# Patient Record
Sex: FEMALE | Race: WHITE | NOT HISPANIC OR LATINO | URBAN - METROPOLITAN AREA
[De-identification: names, ages, dates, MRNs, and addresses within clinical notes are randomized per-mention and may not be internally consistent; named-entity substitution may affect disease eponyms.]

---

## 2018-03-01 ENCOUNTER — EMERGENCY (EMERGENCY)
Facility: HOSPITAL | Age: 68
LOS: 1 days | Discharge: HOME | End: 2018-03-01
Attending: EMERGENCY MEDICINE | Admitting: EMERGENCY MEDICINE
Payer: MEDICARE

## 2018-03-01 VITALS
RESPIRATION RATE: 20 BRPM | HEART RATE: 84 BPM | SYSTOLIC BLOOD PRESSURE: 182 MMHG | DIASTOLIC BLOOD PRESSURE: 95 MMHG | TEMPERATURE: 98 F | OXYGEN SATURATION: 100 %

## 2018-03-01 LAB
ALBUMIN SERPL ELPH-MCNC: 4 G/DL — SIGNIFICANT CHANGE UP (ref 3.3–5)
ALP SERPL-CCNC: 59 U/L — SIGNIFICANT CHANGE UP (ref 40–120)
ALT FLD-CCNC: 15 U/L — SIGNIFICANT CHANGE UP (ref 4–33)
APPEARANCE UR: CLEAR — SIGNIFICANT CHANGE UP
AST SERPL-CCNC: 17 U/L — SIGNIFICANT CHANGE UP (ref 4–32)
BACTERIA # UR AUTO: SIGNIFICANT CHANGE UP
BASOPHILS # BLD AUTO: 0.04 K/UL — SIGNIFICANT CHANGE UP (ref 0–0.2)
BASOPHILS NFR BLD AUTO: 0.6 % — SIGNIFICANT CHANGE UP (ref 0–2)
BILIRUB SERPL-MCNC: 0.6 MG/DL — SIGNIFICANT CHANGE UP (ref 0.2–1.2)
BILIRUB UR-MCNC: NEGATIVE — SIGNIFICANT CHANGE UP
BLOOD UR QL VISUAL: NEGATIVE — SIGNIFICANT CHANGE UP
BUN SERPL-MCNC: 14 MG/DL — SIGNIFICANT CHANGE UP (ref 7–23)
CALCIUM SERPL-MCNC: 9.2 MG/DL — SIGNIFICANT CHANGE UP (ref 8.4–10.5)
CHLORIDE SERPL-SCNC: 98 MMOL/L — SIGNIFICANT CHANGE UP (ref 98–107)
CO2 SERPL-SCNC: 23 MMOL/L — SIGNIFICANT CHANGE UP (ref 22–31)
COLOR SPEC: SIGNIFICANT CHANGE UP
CREAT SERPL-MCNC: 0.81 MG/DL — SIGNIFICANT CHANGE UP (ref 0.5–1.3)
EOSINOPHIL # BLD AUTO: 0.07 K/UL — SIGNIFICANT CHANGE UP (ref 0–0.5)
EOSINOPHIL NFR BLD AUTO: 1 % — SIGNIFICANT CHANGE UP (ref 0–6)
GLUCOSE SERPL-MCNC: 119 MG/DL — HIGH (ref 70–99)
GLUCOSE UR-MCNC: NEGATIVE — SIGNIFICANT CHANGE UP
HBA1C BLD-MCNC: 5.8 % — HIGH (ref 4–5.6)
HCT VFR BLD CALC: 40.8 % — SIGNIFICANT CHANGE UP (ref 34.5–45)
HGB BLD-MCNC: 14.3 G/DL — SIGNIFICANT CHANGE UP (ref 11.5–15.5)
IMM GRANULOCYTES # BLD AUTO: 0.02 # — SIGNIFICANT CHANGE UP
IMM GRANULOCYTES NFR BLD AUTO: 0.3 % — SIGNIFICANT CHANGE UP (ref 0–1.5)
KETONES UR-MCNC: SIGNIFICANT CHANGE UP
LEUKOCYTE ESTERASE UR-ACNC: NEGATIVE — SIGNIFICANT CHANGE UP
LYMPHOCYTES # BLD AUTO: 1.44 K/UL — SIGNIFICANT CHANGE UP (ref 1–3.3)
LYMPHOCYTES # BLD AUTO: 21.2 % — SIGNIFICANT CHANGE UP (ref 13–44)
MCHC RBC-ENTMCNC: 31 PG — SIGNIFICANT CHANGE UP (ref 27–34)
MCHC RBC-ENTMCNC: 35 % — SIGNIFICANT CHANGE UP (ref 32–36)
MCV RBC AUTO: 88.3 FL — SIGNIFICANT CHANGE UP (ref 80–100)
MONOCYTES # BLD AUTO: 0.41 K/UL — SIGNIFICANT CHANGE UP (ref 0–0.9)
MONOCYTES NFR BLD AUTO: 6 % — SIGNIFICANT CHANGE UP (ref 2–14)
MUCOUS THREADS # UR AUTO: SIGNIFICANT CHANGE UP
NEUTROPHILS # BLD AUTO: 4.81 K/UL — SIGNIFICANT CHANGE UP (ref 1.8–7.4)
NEUTROPHILS NFR BLD AUTO: 70.9 % — SIGNIFICANT CHANGE UP (ref 43–77)
NITRITE UR-MCNC: NEGATIVE — SIGNIFICANT CHANGE UP
NRBC # FLD: 0 — SIGNIFICANT CHANGE UP
PH UR: 8 — SIGNIFICANT CHANGE UP (ref 4.6–8)
PLATELET # BLD AUTO: 165 K/UL — SIGNIFICANT CHANGE UP (ref 150–400)
PMV BLD: 10.4 FL — SIGNIFICANT CHANGE UP (ref 7–13)
POTASSIUM SERPL-MCNC: 3.8 MMOL/L — SIGNIFICANT CHANGE UP (ref 3.5–5.3)
POTASSIUM SERPL-SCNC: 3.8 MMOL/L — SIGNIFICANT CHANGE UP (ref 3.5–5.3)
PROT SERPL-MCNC: 7.1 G/DL — SIGNIFICANT CHANGE UP (ref 6–8.3)
PROT UR-MCNC: 20 MG/DL — SIGNIFICANT CHANGE UP
RBC # BLD: 4.62 M/UL — SIGNIFICANT CHANGE UP (ref 3.8–5.2)
RBC # FLD: 12 % — SIGNIFICANT CHANGE UP (ref 10.3–14.5)
RBC CASTS # UR COMP ASSIST: SIGNIFICANT CHANGE UP (ref 0–?)
SODIUM SERPL-SCNC: 137 MMOL/L — SIGNIFICANT CHANGE UP (ref 135–145)
SP GR SPEC: 1.01 — SIGNIFICANT CHANGE UP (ref 1–1.04)
SQUAMOUS # UR AUTO: SIGNIFICANT CHANGE UP
UROBILINOGEN FLD QL: NORMAL MG/DL — SIGNIFICANT CHANGE UP
WBC # BLD: 6.79 K/UL — SIGNIFICANT CHANGE UP (ref 3.8–10.5)
WBC # FLD AUTO: 6.79 K/UL — SIGNIFICANT CHANGE UP (ref 3.8–10.5)
WBC UR QL: SIGNIFICANT CHANGE UP (ref 0–?)

## 2018-03-01 PROCEDURE — 70548 MR ANGIOGRAPHY NECK W/DYE: CPT | Mod: 26

## 2018-03-01 PROCEDURE — 70450 CT HEAD/BRAIN W/O DYE: CPT | Mod: 26

## 2018-03-01 PROCEDURE — 99220: CPT | Mod: GC

## 2018-03-01 PROCEDURE — 71046 X-RAY EXAM CHEST 2 VIEWS: CPT | Mod: 26

## 2018-03-01 PROCEDURE — 70551 MRI BRAIN STEM W/O DYE: CPT | Mod: 26

## 2018-03-01 NOTE — CONSULT NOTE ADULT - ASSESSMENT
Ms. Draper  is a 67 year old female with a PMHx of vertigo (peripheral-room spinning), GERD, tinnitus in left ear, bradycardiac pw lightheadedness that spontaneously resolved. Pt now at her baseline. She follows with Dr. Moreno who had an MRI b and MRA h/n completed sometime in 2014 approx that was reportedly unremarkable. NIHSS 0. Outpatient or cdu work up.    []MRI brain w/o laurel, MRA head w/o laurel, MRA neck with laurel  []F/u with cardiologist for cardiogenic causes of syncope. TTE  []asa 81mg and atorvastain (low dose)  []a1c and ldl

## 2018-03-01 NOTE — ED PROVIDER NOTE - ATTENDING CONTRIBUTION TO CARE
I performed a face to face evaluation of this patient and obtained a history and performed a full exam.  I agree with the history, physical exam and plan of the PA.  Pt with vertigo, lightheadedness, no headache, chest pain, no blurry vision. Well appearing in nad, jade eomi, op wnl, neck supple heart wnl, lung cta, neuro wnl, coordination normal and cn2-12 intact, r/o neuro cause vs. other, labs, ct, neuro consult

## 2018-03-01 NOTE — ED PROVIDER NOTE - PROGRESS NOTE DETAILS
TERELL Mcgill: pt NAD, VSS. Spoke with neuro regarding case - will evaluate the patient in the ED. being seen with neuro, and recommended outpt followup, still being seen- consider CDU, will call pmd TERELL Mcgill: neuro evaluated the patient recommended CDU vs outpt f.u. Pt prefers to stay for w/u for TTE/MRI TERELL Mcgill: neuro evaluated the patient recommended CDU vs outpt f.u. Pt prefers to stay for w/u for possible posterior stroke. Plan: TTE/MRI TERELL Mcgill: spoke with the patients PCP Dr. Doc rowell with plan for CDU. [phone]

## 2018-03-01 NOTE — ED PROVIDER NOTE - OBJECTIVE STATEMENT
67 year old female with a PMHx of vertigo, GERD, tinnitus in left ear, bradycardiac pw lightheadedness and ataxia that began today at 09:00. Pt states that it occurred at rest this morning for a few moments and resolved by itself then occurred more severely after having a bowel movement. Pt states this is nto like her vertigo symptoms. Denies LOC, headache, facial droop, head trauma, n/v/f/c, CP, SOB, abdominal pain, dysuria, hematuria, melena, hematochezia, diarrhea, numbness/tingling in the extremities.

## 2018-03-01 NOTE — ED CDU PROVIDER INITIAL DAY NOTE - OBJECTIVE STATEMENT
Pt is a 66 y/o F nonsmoker PMHx of vertigo, GERD, tinnitus in left ear, bradycardiac p/w lightheadedness this morning.  Pt states this morning pt felt lightheaded as though he may pass out associated with feeling off balance lasting a few minutes then gradually improved.  Pt states symptoms not similar to vertigo.  Pt notes some lightheadedness during BM thereafter.  Pt denies any fevers, chills, nausea, vomiting, diarrhea, constipation, chest pain, SOB, palpitations, syncope, headache, calf pain/swelling, h/o dvt/pe in past, hemoptysis, recent surgeries, recent prolonged immobilization, illicit drug use.  Pt seen by neurology who recommends MRI/MRA, echo.

## 2018-03-01 NOTE — ED CDU PROVIDER INITIAL DAY NOTE - CHPI ED SYMPTOMS NEG
no shortness of breath/no fever/no syncope/no back pain/no chills/no cough/no diaphoresis/no nausea/no vomiting

## 2018-03-01 NOTE — ED CDU PROVIDER INITIAL DAY NOTE - PROGRESS NOTE DETAILS
This patient was signed out to me by day CDU TERELL Dumont and attending Dr. Tiwari; test results reviewed.  In the interim, pt has been resting comfortably; no issues to date.  Pt. stable at present; will continue to monitor/reassess.

## 2018-03-01 NOTE — ED PROVIDER NOTE - MEDICAL DECISION MAKING DETAILS
67 year old female with a PMHx of vertigo, GERD, tinnitus in left ear, bradycardiac pw lightheadedness and ataxia that began today at 09:00.   Plan: cbc, cmp, ct head r/o cerebellar stroke

## 2018-03-01 NOTE — ED ADULT NURSE NOTE - OBJECTIVE STATEMENT
Pt received to room 22 A&Ox4 complaining of dizziness that started this AM. Pt states that she has a hx of vertigo but these symptoms are not similar to that. Pt denies chest pain, SOB, N/V/D, fever or chills. Pt placed on cardiac monitor. IV access obtained, labs drawn and sent.

## 2018-03-01 NOTE — ED CDU PROVIDER INITIAL DAY NOTE - ATTENDING CONTRIBUTION TO CARE
I performed a face to face bedside interview with patient regarding history of present illness, review of symptoms and past medical history. I completed an independent physical exam.  I have discussed patient's plan of care with PA.   I agree with note as stated above, having amended the EMR as needed to reflect my findings. I have discussed the assessment and plan of care.  This includes during the time I functioned as the attending physician for this patient.    Attending Exam - Dr. Tiwari: GEN: well appearing, NAD  HEENT: +PERRL, EOMI  RESP: CTAB, no signs of respiratory distress CV: s1s2 RRR ABD: soft/non tender/non distended  MSK: no deformities / swelling, normal range of motion, spine grossly normal NEURO: alert, non focal exam SKIN: normal color / temperature / condition.    Attending MDM: will follow neuro recs, obtain MRI/MRA, ECHO, tele obs, neurochecks.  Currently no neuro deficits and feels better.

## 2018-03-01 NOTE — CONSULT NOTE ADULT - ATTENDING COMMENTS
pt seen with housestaff. briefly episode of near syncope on getting up from the toilet with sig hypertension,  DDx: TIA vs. hypertensive crisis vs. presyncope.  abcd2 score 2 low likelihood of recurrence.  MRI/MRA without any sig finding. MRA no evidence of any sig basilar stenosis.  osteoma, previously seen unchanged on the left.  She is followed by neurologist Dr. Cowart  bradycardia w/u per medicine.   would favor asa 81 temporarily  bp goal < 140 consider monitoring.   no further neurologic w/u indicated.

## 2018-03-01 NOTE — ED CDU PROVIDER INITIAL DAY NOTE - MEDICAL DECISION MAKING DETAILS
Pt is a 66 y/o F nonsmoker PMHx of vertigo, GERD, tinnitus in left ear, bradycardiac p/w lightheadedness this morning -- likely near syncope, possible cardiogenic cause -- telemetry, echo, mri/mra

## 2018-03-01 NOTE — CONSULT NOTE ADULT - SUBJECTIVE AND OBJECTIVE BOX
NEUROLOGY CONSULT NOTE    Patient is a 67y old  Female who presents with a chief complaint of     HPI:  Ms. Draper  is a 67 year old female with a PMHx of vertigo (peripheral-room spinning), GERD, tinnitus in left ear, bradycardiac pw lightheadedness today at 09:00 and a return to baseline in the ER today.        Neurological Review of Systems:  No difficulty with language.  No vision loss or double vision.  No dizziness, vertigo or new hearing loss.  No difficulty with speech or swallowing.  No focal weakness.  No focal sensory changes.  No numbness or tingling in the bilateral lower extremities.  No difficulty with balance.  No difficulty with ambulation.        MEDICATIONS  (STANDING):    MEDICATIONS  (PRN):    Allergies    Allergy Status Unknown    Intolerances      PAST MEDICAL & SURGICAL HISTORY:  Bradycardia  Tinnitus  Vertigo  GERD (gastroesophageal reflux disease)  No significant past surgical history    FAMILY HISTORY:    SOCIAL HISTORY: non smoker/ former smoker/ active smoker    Review of Systems:  Constitutional: No generalized weakness. No fevers or chills.                    Eyes, Ears, Mouth, Throat: No vision loss   Respiratory: No shortness of breath or cough.                                Cardiovascular: No chest pain or palpitations  Gastrointestinal: No nausea or vomiting.                                         Genitourinary: No urinary incontinence or burning on urination.  Musculoskeletal: No joint pain.                                                           Dermatologic: No rash.  Neurological: as per HPI                                                                      Psychiatric: No behavioral problems.  Endocrine: No known hypoglycemia.               Hematologic/Lymphatic: No easy bleeding.    O:  Vital Signs Last 24 Hrs  T(C): 36.9 (01 Mar 2018 10:39), Max: 36.9 (01 Mar 2018 09:53)  T(F): 98.4 (01 Mar 2018 10:39), Max: 98.4 (01 Mar 2018 09:53)  HR: 60 (01 Mar 2018 10:39) (60 - 84)  BP: 162/96 (01 Mar 2018 10:39) (162/96 - 182/95)  BP(mean): --  RR: 16 (01 Mar 2018 10:39) (16 - 20)  SpO2: 99% (01 Mar 2018 10:39) (99% - 100%)    General Exam:   General appearance: No acute distress                 Cardiovascular: Pedal dorsalis pulses intact bilaterally    MENTAL STATUS: Orientated to self, date and place.  Attention intact.  No dysarthria, aphasia or neglect.  Knowledge intact.  Registration intact.  Short and long term memory grossly intact.      CRANIAL NERVES: CN I - not tested.  PERRL, EOMI, VFF, no nystagmus or diplopia.  No APD.  Fundi not visualized.  CN V1-3 intact to light touch and pinprick.  No facial asymmetry.  Hearing intact to finger rub bilaterally.  Tongue, uvula and palate midline.  Sternocleidomastoid and Trapezius intact bilaterally.    MOTOR:   Tone: normal.                  Strength intact throughout  No pronator drift bilaterally                      No dysmetria on finger-nose-finger or heel-shin-heel  No truncal ataxia.  No resting, postural or action tremor.  No myoclonus.      UPPER EXTREMITY on resistance          DELT     BICEP     TRICEP      R   ///  L              5    LOWER EXTREMITY on resistance        HF      KE      KF     DP     PF  R      L          5     SENSORY: intact to light touch, pinprick, vibration and proprioception    GAIT: normal and stable.  Oneliaerg -rah.      LABS:                        14.3   6.79  )-----------( 165      ( 01 Mar 2018 10:42 )             40.8     03-01    137  |  98  |  14  ----------------------------<  119<H>  3.8   |  23  |  0.81    Ca    9.2      01 Mar 2018 10:42    TPro  7.1  /  Alb  4.0  /  TBili  0.6  /  DBili  x   /  AST  17  /  ALT  15  /  AlkPhos  59  03-      Urinalysis Basic - ( 01 Mar 2018 12:37 )    Color: COLORL / Appearance: CLEAR / S.006 / pH: 8.0  Gluc: NEGATIVE / Ketone: TRACE  / Bili: NEGATIVE / Urobili: NORMAL mg/dL   Blood: NEGATIVE / Protein: 20 mg/dL / Nitrite: NEGATIVE   Leuk Esterase: NEGATIVE / RBC: 0-2 / WBC 0-2   Sq Epi: OCC / Non Sq Epi: x / Bacteria: FEW      LIVER FUNCTIONS - ( 01 Mar 2018 10:42 )  Alb: 4.0 g/dL / Pro: 7.1 g/dL / ALK PHOS: 59 u/L / ALT: 15 u/L / AST: 17 u/L / GGT: x                   RADIOLOGY & ADDITIONAL STUDIES:    [] cxr: clear  [] ua: neg  [] CT h: noncontributory

## 2018-03-01 NOTE — ED ADULT TRIAGE NOTE - CHIEF COMPLAINT QUOTE
hx of vertigo reports sudden onset dizziness at 9am that feels different than her room spinning sensations she feels with vertigo, staes dizziness is more of "her spinning", pt notably hypertensive as well.    Fit eval by Dr Ng no stroke code called.

## 2018-03-02 VITALS
OXYGEN SATURATION: 98 % | DIASTOLIC BLOOD PRESSURE: 75 MMHG | RESPIRATION RATE: 16 BRPM | TEMPERATURE: 98 F | SYSTOLIC BLOOD PRESSURE: 132 MMHG | HEART RATE: 52 BPM

## 2018-03-02 LAB
BACTERIA UR CULT: SIGNIFICANT CHANGE UP
SPECIMEN SOURCE: SIGNIFICANT CHANGE UP

## 2018-03-02 PROCEDURE — 99217: CPT

## 2018-03-02 PROCEDURE — 93010 ELECTROCARDIOGRAM REPORT: CPT | Mod: 59,GC

## 2018-03-02 PROCEDURE — 93306 TTE W/DOPPLER COMPLETE: CPT | Mod: 26

## 2018-03-02 NOTE — ED CDU PROVIDER SUBSEQUENT DAY NOTE - ATTENDING CONTRIBUTION TO CARE
68yo F hx of vertigo, GERD, tinnitus of left ear, bradycardia presented to ED co transient episode of feeling lightheaded and ataxic gait. no cp or sob. no recent illness no abdominal pain. no vomit/diarrhea. no focal weakness or numbness. no change in vison. no  slurred speech. no trauma.   In ED symptoms had resolved, ct wnl. neurology recommend CDU for MRI and echo.   Vital signs noted. pt sitting up no acute distress. normal S1-S2 No resp distress. able to speak in full and clear sentences. no wheeze, rales or stridor. AOx3, no focal deficits. CN 2-12 grossly intact. nl gait. no meningeal signs.   plan pending MRI read, echo today, neurology to see pt,  re assess

## 2018-03-02 NOTE — ED CDU PROVIDER SUBSEQUENT DAY NOTE - PROGRESS NOTE DETAILS
Pt stable at present; will continue to monitor.  Pt. will be signed out to day CDU PA and MD at 0700 hrs. TERELL Mcgill: pt NAD, VSS. No acute complaints. Pt has been asymptomatic since initial symptoms yesterday morning. Pt ambulating without difficulty. PE: cardiac: RRR, Lungs: CTA, neuro: CN1-12 grossly intact, motor strength intact in all extremities, gait WNL. Discussed the results of the patients labs/imaging. Pt evaluated by Neuro this am - cleared for d/c. TERELL Mcgill: pt NAD, VSS. No acute complaints. No events on tele overnight. Pt has been asymptomatic since initial symptoms yesterday morning. Pt ambulating without difficulty. PE: cardiac: RRR, Lungs: CTA, neuro: CN1-12 grossly intact, motor strength intact in all extremities, gait WNL. Discussed the results of the patients labs/imaging. Pt evaluated by Neuro this am - cleared for d/c.

## 2018-03-02 NOTE — ED CDU PROVIDER DISPOSITION NOTE - CLINICAL COURSE
67 year old female with a PMHx of tinnitus, bradycardia, vertigo pw 2 episodes of lightheadedness/dizziness and ataxia yesterday. Pt had a bowel movement and symptoms occurred suddenly and resolved by itself after a few hours of onset. In the ED - negative ct - neuro evaluated the patient recommended MRI/MRA brain/neck as well as echo. MRI/echo WNL. Pt did not have any recurrence of symptoms while in the ED. Neuro cleared pt for d/c. Pt able to go home.

## 2018-03-02 NOTE — ED CDU PROVIDER SUBSEQUENT DAY NOTE - HISTORY
66 y/o F nonsmoker PMHx of vertigo, GERD, tinnitus in left ear, bradycardiac p/w lightheadedness this morning.  Pt states this morning pt felt lightheaded as though he may pass out associated with feeling off balance lasting a few minutes then gradually improved.  Pt states symptoms not similar to vertigo.  Pt notes some lightheadedness during BM thereafter.  Pt denies any fevers, chills, nausea, vomiting, diarrhea, constipation, chest pain, SOB, palpitations, syncope, headache, calf pain/swelling, h/o dvt/pe in past, hemoptysis, recent surgeries, recent prolonged immobilization, illicit drug use.  Pt seen by neurology who recommends MRI/MRA, echo.  In the interim, pt had MRI/A completed; results pending at time of this note entry.  No issues in interim; pt has been objectively noted to be resting comfortably.  Pt. scheduled for echo this morning.

## 2018-03-02 NOTE — ED CDU PROVIDER DISPOSITION NOTE - ATTENDING CONTRIBUTION TO CARE
Dr. Dutton- Attending Statement: I have reviewed and agree with all pertinent clinical information, including history and physical exam and agree with treatment plan of the PA, except as noted.  66yo F presented w episode of feeling dizzy and ataxic gait, self resolved. Asymptomatic in ED. no cp or sob. Evaluated by  neurology cleared for dc home. Recommend outpatient follow up. Wnl ct head, MRI, and echo. pt informed of results. advised to follow up with pmd and neurology. pt given copies of BP read while in ED. advised to have BP rechecked in one week.

## 2018-03-05 ENCOUNTER — EMERGENCY (EMERGENCY)
Facility: HOSPITAL | Age: 68
LOS: 1 days | Discharge: HOME | End: 2018-03-05
Attending: EMERGENCY MEDICINE
Payer: MEDICARE

## 2018-03-05 ENCOUNTER — TRANSCRIPTION ENCOUNTER (OUTPATIENT)
Age: 68
End: 2018-03-05

## 2018-03-05 VITALS
TEMPERATURE: 98 F | SYSTOLIC BLOOD PRESSURE: 191 MMHG | WEIGHT: 175.05 LBS | RESPIRATION RATE: 18 BRPM | OXYGEN SATURATION: 100 % | DIASTOLIC BLOOD PRESSURE: 98 MMHG | HEIGHT: 68 IN | HEART RATE: 74 BPM

## 2018-03-05 VITALS
HEART RATE: 56 BPM | RESPIRATION RATE: 18 BRPM | SYSTOLIC BLOOD PRESSURE: 152 MMHG | OXYGEN SATURATION: 99 % | DIASTOLIC BLOOD PRESSURE: 79 MMHG

## 2018-03-05 PROCEDURE — 93010 ELECTROCARDIOGRAM REPORT: CPT

## 2018-03-05 PROCEDURE — 99284 EMERGENCY DEPT VISIT MOD MDM: CPT | Mod: 25,GC

## 2018-03-05 PROCEDURE — 99283 EMERGENCY DEPT VISIT LOW MDM: CPT | Mod: 25

## 2018-03-05 PROCEDURE — 93005 ELECTROCARDIOGRAM TRACING: CPT

## 2018-03-05 RX ORDER — AMLODIPINE BESYLATE 2.5 MG/1
5 TABLET ORAL ONCE
Qty: 0 | Refills: 0 | Status: DISCONTINUED | OUTPATIENT
Start: 2018-03-05 | End: 2018-03-05

## 2018-03-05 RX ORDER — AMLODIPINE BESYLATE 2.5 MG/1
1 TABLET ORAL
Qty: 14 | Refills: 0
Start: 2018-03-05 | End: 2018-03-18

## 2018-03-05 NOTE — ED ADULT NURSE REASSESSMENT NOTE - NS ED NURSE REASSESS COMMENT FT1
Pt ambulated independently - Dr. Serna aware. Pt is in no current distress. Comfort and safety provided. Awaiting d/c.

## 2018-03-05 NOTE — ED PROVIDER NOTE - ATTENDING CONTRIBUTION TO CARE
68 y/o female with the above documented history who presents with a cc of elevated blood pressure a/w light-headedness. It is similar to what she experienced at the time of her recent admission to [facility] but not nearly as intense. On exam, she appears very well and comfortable and is neurologically intact. There is nothing clinically evident to suggest any emergent process; i.e., hypertensive emergency, CNS event, etc. We will obtain an EKG and have reached out to her physician(s), one of whom she has an appt with tomorrow. Barring any significant abnormality on her EKG or change in her clinical condition, she should be suitable for DC to f/u as is already arranged.

## 2018-03-05 NOTE — ED PROVIDER NOTE - MEDICAL DECISION MAKING DETAILS
67F w/ no pmh but recent workup for hypertensive urgency presents today with mild lightheadedness in the setting of elevated BP to 190s systolic in triage. pt just had a robust workup at [facility] at the end of last week with normal blood work, MRI and echo. Will call PMD to discuss starting an antihypertensive in the ED with close PMD follow up. Pt already had appointment with PMD this week.

## 2018-03-05 NOTE — ED PROVIDER NOTE - PROGRESS NOTE DETAILS
Leobardo GORDON: Discussed with pt's PMD. Will start amlodipine 5mg in the ED then road test and dc if pt can ambulate. Leobardo GORDON: Pt ambulating well. BP improved to 150s prior to amlodipine being dosed. Amlodipine is not given. Rx sent to pharmacy

## 2018-03-05 NOTE — ED ADULT NURSE NOTE - OBJECTIVE STATEMENT
67 y.o. Female presents to the ED c/o hypertension. Hx GERD, bradycardia, tinnitus, vertigo. Pt reports waking up on Thursday, had her coffee and felt lightheaded - pt went to [facility] and had a negative stroke work up from there. Pt was d/c from [facility] and recorded her BP since then and today, pt states she felt lightheaded today and measured her BP to be 200s/110s. Pt looks more flushed than usual as per  at bedside. Neuro intact. A&Ox3. PERRL. Denies weakness/numbness, CP, SOB, N/V/D, urinary/bowel complications, fever/chills. Pt is in no current distress. Comfort and safety provided. Will continue to monitor. Dr. Booth at bedside for assessment.

## 2018-03-05 NOTE — ED PROVIDER NOTE - OBJECTIVE STATEMENT
67F w/ pmh HTN, vertigo, GERD, bradycardic, presents today with elevated blood pressure. This past thursday morning felt so lightheaded that she couldn't walk more than a few feet and had difficulty standing. Pt went to [facility] at that time, where she was admitted for stroke workup, which was negative. She was sent home on no medications. Since going home she had been keeping a log of her blood pressures and noted steadily increasing SBP since dc: 140s->150s->170s->190s. Pt is currently endorsing some mild lightheadedness, but nowhere near what she had on thursday.     ROS: Denies HA, blurry vision, chest pain, sob, nausea, abd pain, diarrhea, jaw/shoulder pain, photophobia, dysuria, hematuria, hematochezia.  Meds: Not on anything  Allergies: NKDA  PMD: Brayan Roach - [phone]  Cards: Charles   Neuro: [place]

## 2018-03-09 ENCOUNTER — APPOINTMENT (OUTPATIENT)
Dept: CARDIOLOGY | Facility: CLINIC | Age: 68
End: 2018-03-09
Payer: MEDICARE

## 2018-03-09 ENCOUNTER — NON-APPOINTMENT (OUTPATIENT)
Age: 68
End: 2018-03-09

## 2018-03-09 VITALS
SYSTOLIC BLOOD PRESSURE: 170 MMHG | WEIGHT: 170 LBS | DIASTOLIC BLOOD PRESSURE: 62 MMHG | BODY MASS INDEX: 25.85 KG/M2 | OXYGEN SATURATION: 100 % | HEART RATE: 59 BPM

## 2018-03-09 PROCEDURE — 99205 OFFICE O/P NEW HI 60 MIN: CPT

## 2018-03-09 PROCEDURE — 93000 ELECTROCARDIOGRAM COMPLETE: CPT

## 2018-03-09 RX ORDER — BACITRACIN 500 [USP'U]/G
500 OINTMENT OPHTHALMIC
Qty: 4 | Refills: 0 | Status: COMPLETED | COMMUNITY
Start: 2017-11-16

## 2018-03-09 RX ORDER — AZITHROMYCIN 250 MG/1
250 TABLET, FILM COATED ORAL
Qty: 6 | Refills: 0 | Status: COMPLETED | COMMUNITY
Start: 2017-12-21

## 2018-03-09 RX ORDER — MUPIROCIN 20 MG/G
2 OINTMENT TOPICAL
Qty: 22 | Refills: 0 | Status: COMPLETED | COMMUNITY
Start: 2017-10-25

## 2018-03-09 RX ORDER — VALACYCLOVIR 500 MG/1
500 TABLET, FILM COATED ORAL
Qty: 20 | Refills: 0 | Status: COMPLETED | COMMUNITY
Start: 2017-11-07

## 2018-03-28 ENCOUNTER — NON-APPOINTMENT (OUTPATIENT)
Age: 68
End: 2018-03-28

## 2018-03-28 ENCOUNTER — APPOINTMENT (OUTPATIENT)
Dept: CARDIOLOGY | Facility: CLINIC | Age: 68
End: 2018-03-28
Payer: MEDICARE

## 2018-03-28 VITALS
SYSTOLIC BLOOD PRESSURE: 154 MMHG | DIASTOLIC BLOOD PRESSURE: 78 MMHG | HEART RATE: 58 BPM | HEIGHT: 68 IN | WEIGHT: 172 LBS | BODY MASS INDEX: 26.07 KG/M2

## 2018-03-28 VITALS — DIASTOLIC BLOOD PRESSURE: 74 MMHG | SYSTOLIC BLOOD PRESSURE: 138 MMHG

## 2018-03-28 PROCEDURE — 99214 OFFICE O/P EST MOD 30 MIN: CPT

## 2018-03-28 PROCEDURE — 93000 ELECTROCARDIOGRAM COMPLETE: CPT

## 2018-04-25 ENCOUNTER — APPOINTMENT (OUTPATIENT)
Dept: OPHTHALMOLOGY | Facility: CLINIC | Age: 68
End: 2018-04-25
Payer: MEDICARE

## 2018-04-25 PROCEDURE — 92060 SENSORIMOTOR EXAMINATION: CPT

## 2018-04-25 PROCEDURE — 99204 OFFICE O/P NEW MOD 45 MIN: CPT

## 2018-05-21 ENCOUNTER — APPOINTMENT (OUTPATIENT)
Dept: OTOLARYNGOLOGY | Facility: CLINIC | Age: 68
End: 2018-05-21

## 2018-05-23 ENCOUNTER — APPOINTMENT (OUTPATIENT)
Dept: OTOLARYNGOLOGY | Facility: CLINIC | Age: 68
End: 2018-05-23
Payer: MEDICARE

## 2018-05-23 VITALS
BODY MASS INDEX: 26.52 KG/M2 | SYSTOLIC BLOOD PRESSURE: 147 MMHG | HEIGHT: 68 IN | WEIGHT: 175 LBS | DIASTOLIC BLOOD PRESSURE: 79 MMHG | HEART RATE: 57 BPM

## 2018-05-23 DIAGNOSIS — Z86.69 PERSONAL HISTORY OF OTHER DISEASES OF THE NERVOUS SYSTEM AND SENSE ORGANS: ICD-10-CM

## 2018-05-23 DIAGNOSIS — Z87.19 PERSONAL HISTORY OF OTHER DISEASES OF THE DIGESTIVE SYSTEM: ICD-10-CM

## 2018-05-23 DIAGNOSIS — Z80.42 FAMILY HISTORY OF MALIGNANT NEOPLASM OF PROSTATE: ICD-10-CM

## 2018-05-23 DIAGNOSIS — Z86.79 PERSONAL HISTORY OF OTHER DISEASES OF THE CIRCULATORY SYSTEM: ICD-10-CM

## 2018-05-23 DIAGNOSIS — Z82.3 FAMILY HISTORY OF STROKE: ICD-10-CM

## 2018-05-23 PROCEDURE — 99204 OFFICE O/P NEW MOD 45 MIN: CPT

## 2018-05-30 ENCOUNTER — MESSAGE (OUTPATIENT)
Age: 68
End: 2018-05-30

## 2018-06-01 ENCOUNTER — MOBILE ON CALL (OUTPATIENT)
Age: 68
End: 2018-06-01

## 2018-06-06 ENCOUNTER — APPOINTMENT (OUTPATIENT)
Dept: OTOLARYNGOLOGY | Facility: CLINIC | Age: 68
End: 2018-06-06
Payer: MEDICARE

## 2018-06-06 PROCEDURE — 92584 ELECTROCOCHLEOGRAPHY: CPT

## 2018-06-08 ENCOUNTER — RESULT REVIEW (OUTPATIENT)
Age: 68
End: 2018-06-08

## 2018-07-11 ENCOUNTER — MOBILE ON CALL (OUTPATIENT)
Age: 68
End: 2018-07-11

## 2018-10-17 ENCOUNTER — NON-APPOINTMENT (OUTPATIENT)
Age: 68
End: 2018-10-17

## 2018-10-17 ENCOUNTER — APPOINTMENT (OUTPATIENT)
Dept: CARDIOLOGY | Facility: CLINIC | Age: 68
End: 2018-10-17
Payer: MEDICARE

## 2018-10-17 VITALS
BODY MASS INDEX: 25.91 KG/M2 | DIASTOLIC BLOOD PRESSURE: 65 MMHG | HEART RATE: 62 BPM | SYSTOLIC BLOOD PRESSURE: 118 MMHG | OXYGEN SATURATION: 96 % | WEIGHT: 171 LBS | HEIGHT: 68 IN

## 2018-10-17 VITALS — SYSTOLIC BLOOD PRESSURE: 150 MMHG | DIASTOLIC BLOOD PRESSURE: 80 MMHG

## 2018-10-17 PROBLEM — K21.9 GASTRO-ESOPHAGEAL REFLUX DISEASE WITHOUT ESOPHAGITIS: Chronic | Status: ACTIVE | Noted: 2018-03-01

## 2018-10-17 PROBLEM — H93.19 TINNITUS, UNSPECIFIED EAR: Chronic | Status: ACTIVE | Noted: 2018-03-01

## 2018-10-17 PROBLEM — R42 DIZZINESS AND GIDDINESS: Chronic | Status: ACTIVE | Noted: 2018-03-01

## 2018-10-17 PROCEDURE — 99214 OFFICE O/P EST MOD 30 MIN: CPT

## 2018-10-17 PROCEDURE — 93000 ELECTROCARDIOGRAM COMPLETE: CPT

## 2018-10-17 RX ORDER — METHYLPREDNISOLONE 4 MG/1
4 TABLET ORAL
Qty: 21 | Refills: 0 | Status: DISCONTINUED | COMMUNITY
Start: 2018-05-23 | End: 2018-10-17

## 2018-10-17 RX ORDER — TRIAMTERENE AND HYDROCHLOROTHIAZIDE 37.5; 25 MG/1; MG/1
37.5-25 CAPSULE ORAL
Qty: 14 | Refills: 0 | Status: DISCONTINUED | COMMUNITY
Start: 2017-11-07 | End: 2018-10-17

## 2018-10-17 RX ORDER — AMLODIPINE BESYLATE 5 MG/1
5 TABLET ORAL DAILY
Qty: 45 | Refills: 0 | Status: DISCONTINUED | COMMUNITY
Start: 2018-03-09 | End: 2018-10-17

## 2018-10-18 ENCOUNTER — MEDICATION RENEWAL (OUTPATIENT)
Age: 68
End: 2018-10-18

## 2018-10-24 ENCOUNTER — RESULT REVIEW (OUTPATIENT)
Age: 68
End: 2018-10-24

## 2018-10-31 ENCOUNTER — APPOINTMENT (OUTPATIENT)
Dept: CARDIOLOGY | Facility: CLINIC | Age: 68
End: 2018-10-31
Payer: MEDICARE

## 2018-10-31 VITALS
HEIGHT: 68 IN | BODY MASS INDEX: 25.76 KG/M2 | OXYGEN SATURATION: 98 % | WEIGHT: 170 LBS | DIASTOLIC BLOOD PRESSURE: 73 MMHG | HEART RATE: 53 BPM | SYSTOLIC BLOOD PRESSURE: 120 MMHG

## 2018-10-31 PROCEDURE — 36415 COLL VENOUS BLD VENIPUNCTURE: CPT

## 2018-10-31 PROCEDURE — 93000 ELECTROCARDIOGRAM COMPLETE: CPT

## 2018-10-31 PROCEDURE — 99214 OFFICE O/P EST MOD 30 MIN: CPT

## 2018-11-01 LAB
ANION GAP SERPL CALC-SCNC: 13 MMOL/L
BUN SERPL-MCNC: 14 MG/DL
CALCIUM SERPL-MCNC: 9.7 MG/DL
CHLORIDE SERPL-SCNC: 107 MMOL/L
CO2 SERPL-SCNC: 26 MMOL/L
CREAT SERPL-MCNC: 0.93 MG/DL
GLUCOSE SERPL-MCNC: 71 MG/DL
POTASSIUM SERPL-SCNC: 4.3 MMOL/L
SODIUM SERPL-SCNC: 146 MMOL/L

## 2018-11-14 ENCOUNTER — RX RENEWAL (OUTPATIENT)
Age: 68
End: 2018-11-14

## 2018-11-26 ENCOUNTER — MEDICATION RENEWAL (OUTPATIENT)
Age: 68
End: 2018-11-26

## 2018-11-28 ENCOUNTER — APPOINTMENT (OUTPATIENT)
Dept: CARDIOLOGY | Facility: CLINIC | Age: 68
End: 2018-11-28
Payer: MEDICARE

## 2018-11-28 VITALS
OXYGEN SATURATION: 98 % | HEIGHT: 68 IN | BODY MASS INDEX: 25.61 KG/M2 | SYSTOLIC BLOOD PRESSURE: 124 MMHG | WEIGHT: 169 LBS | HEART RATE: 51 BPM | DIASTOLIC BLOOD PRESSURE: 74 MMHG

## 2018-11-28 PROCEDURE — 99214 OFFICE O/P EST MOD 30 MIN: CPT

## 2018-11-28 PROCEDURE — 36415 COLL VENOUS BLD VENIPUNCTURE: CPT

## 2018-11-28 NOTE — HISTORY OF PRESENT ILLNESS
[FreeTextEntry1] : Mrs. Chika Draper is a 68-year-old woman evaluated in the past for mild sick sinus syndrome with tendency to bradycardia and felt to have no significant cardiac disease. After a busy day at work onset of sense that her vision was "swimming" and she tolerated for several hours, but when it persisted the following morning went to the emergency room. She had a complete neurological evaluation and MRI/MRA and was concluded not to have any central nervous system abnormality. She was observed for bradycardia in the CDU and had an echocardiogram that was entirely normal. She was discharged and advised to followup with cardiologist.\par \par Remained well for a time, then sudden episode of profound weakness. Could barely stand up, unable to get to car. Called ambulance and went to [facility] ER. Blood pressure elevated in range of 200/100, but declined without specific treatment. CT head, MRI, complete blood tests all normal and then cardiac echo normal as well including normal bubble study. Had.renal sonogram and urine for metanephrines and saw Dr. Roach in office. Has had further neurology evaluation and presumed diagnosis of Meniere's and had monthly severe vertiginous spells. Neurologist proposed starting acetazolamide, then increased and recommends another increase.. Event Monitor proved unremarkable.

## 2018-11-28 NOTE — DISCUSSION/SUMMARY
[Hypertension] : hypertension [Stable] : stable [Urine Metanephrine] : urine metanephrine [Event Monitor] : event monitor [None] : none [Patient] : the patient [de-identified] : continuing to increase acetazolamide with another BMP pending and then if satisfactory raise to 500 mg BID as recommended by neurologist [de-identified] : mild sinus bradycardia occasionally with SVPB's and on routine EKG observe flurry of SVPB's and prolonged Event Recorder with mild sinus bradycardia, occasional brief flurries of few beats of SVT, no prolonged episodes. [de-identified] : only reassurance seems necessary and there is no treatment for bradycardia and occasional supraventricular premature beats observed

## 2018-11-28 NOTE — REVIEW OF SYSTEMS
[Seeing Double (Diplopia)] : diplopia [Palpitations] : palpitations [Dizziness] : dizziness [Negative] : Heme/Lymph [Shortness Of Breath] : no shortness of breath [Dyspnea on exertion] : not dyspnea during exertion [Chest Pain] : no chest pain [Lower Ext Edema] : no extremity edema [Leg Claudication] : no intermittent leg claudication

## 2018-11-28 NOTE — CARDIOLOGY SUMMARY
[___] : [unfilled] [LVEF ___%] : LVEF [unfilled]% [None] : normal LV function [Mild] : mild pulmonary hypertension [Enlarged] : enlarged LA size [Moderate] : moderate mitral regurgitation

## 2018-11-29 LAB
ANION GAP SERPL CALC-SCNC: 12 MMOL/L
BUN SERPL-MCNC: 16 MG/DL
CALCIUM SERPL-MCNC: 9.8 MG/DL
CHLORIDE SERPL-SCNC: 109 MMOL/L
CO2 SERPL-SCNC: 23 MMOL/L
CREAT SERPL-MCNC: 0.96 MG/DL
GLUCOSE SERPL-MCNC: 86 MG/DL
POTASSIUM SERPL-SCNC: 4.1 MMOL/L
SODIUM SERPL-SCNC: 144 MMOL/L

## 2018-12-26 ENCOUNTER — MOBILE ON CALL (OUTPATIENT)
Age: 68
End: 2018-12-26

## 2019-04-16 ENCOUNTER — MESSAGE (OUTPATIENT)
Age: 69
End: 2019-04-16

## 2019-04-18 ENCOUNTER — TRANSCRIPTION ENCOUNTER (OUTPATIENT)
Age: 69
End: 2019-04-18

## 2019-04-29 ENCOUNTER — RESULT REVIEW (OUTPATIENT)
Age: 69
End: 2019-04-29

## 2019-05-08 ENCOUNTER — APPOINTMENT (OUTPATIENT)
Dept: OTOLARYNGOLOGY | Facility: CLINIC | Age: 69
End: 2019-05-08
Payer: MEDICARE

## 2019-05-08 VITALS
HEART RATE: 67 BPM | BODY MASS INDEX: 25.01 KG/M2 | HEIGHT: 68 IN | WEIGHT: 165 LBS | DIASTOLIC BLOOD PRESSURE: 76 MMHG | SYSTOLIC BLOOD PRESSURE: 142 MMHG

## 2019-05-08 DIAGNOSIS — K21.9 GASTRO-ESOPHAGEAL REFLUX DISEASE W/OUT ESOPHAGITIS: ICD-10-CM

## 2019-05-08 DIAGNOSIS — H61.23 IMPACTED CERUMEN, BILATERAL: ICD-10-CM

## 2019-05-08 PROCEDURE — 99214 OFFICE O/P EST MOD 30 MIN: CPT | Mod: 25

## 2019-05-08 PROCEDURE — 69210 REMOVE IMPACTED EAR WAX UNI: CPT

## 2019-05-08 NOTE — PHYSICAL EXAM
[Midline] : trachea located in midline position [Normal] : horizontal positional vertigo maneuver was normal [Fukuda Step Test] : Fukuda Step Test was Positive [Hearing Loss Right Only] : normal [Hearing Loss Left Only] : normal [Nystagmus] : ~T no ~M nystagmus was seen [Fistula Sign] : Fistula Sign: Negative [Romberg's Sign] : Romberg's sign was absent [Brian-Hallevertonke] : [place]pike: Negative [Past-Pointing] : Past-Pointing: Negative [FreeTextEntry6] : wax [de-identified] : wnl

## 2019-05-08 NOTE — HISTORY OF PRESENT ILLNESS
[No] : patient does not have a  history of radiation therapy [Tinnitus] : tinnitus [Dizziness] : dizziness [Hearing Loss] : hearing loss [Vertigo] : vertigo [None] : No risk factors have been identified. [de-identified] : Emeregency visit\par Known left w Meniere's disease\par Recent URI and bilat ear fullness\par last night used a q-tip and then had right bloody ear drainage-moderate w/ assoc hearing loss and mild dizzinerss\par hx-\par 67 yo w/ recurrent vertigo\par 2012- left tinnitus and hearing loss eval by Dr Flores\par tx- steroids and diuretic\par w/u negative\par seen by Dr Rosenstein and Shahzad-neuro\par 2014- dizzy again\par VNG-pos left vestib weakness\par april 2018-episodic vertigo-moderate to sevre w assoc hearing loss and tinnitus [Anxiety] : no anxiety [Lightheadedness] : no lightheadedness [Neurologic Symptoms] : no associated neurologic symptoms [Orthostatic Hypotension] : no orthostatic hypotension [Visual Changes] : no visual changes [Otorrhea] : no otorrhea [Loud Noise Exposure] : no history of loud noise exposure [Early Onset Hearing Loss] : no early onset hearing loss [Smoking] : no smoking [Otosclerosis] : no otosclerosis [Meniere Disease] : no meniere disease [Stroke] : no stroke [Cardiac Disease] : no cardiac disease [Autoimmune Disease] : no autoimmune disease [Facial Pain] : no facial pain [Headache] : no headache [Clear Rhinorrhea] : no clear rhinorrhea [Facial Pressure] : no facial pressure [Retro-Orbital Pain] : no retro-orbital pain [Purulent Rhinorrhea] : no purulent rhinorrhea [Nasal Congestion] : no nasal congestion [Dental Pain] : no dental pain [Postnasal Drainage] : no postnasal drainage [Ear Pain] : no ear pain [Neck Stiffness] : no neck stiffness [Periorbital Redness] : no periorbital redness [Ear Pressure] : no ear pressure [Periorbital Swelling] : no periorbital swelling [Nausea] : no nausea [Allergic Rhinitis] : no allergic rhinitis [Ear Fullness] : no ear fullness [Sore Throat] : no sore throat [Dental Infection] : no dental infection [Environmental Allergies] : no environmental allergies [Nasal Foreign Body] : no nasal foreign body [Dental Procedure] : no dental procedure [Facial Trauma] : no facial trauma [Seasonal Allergies] : no seasonal allergies [Environmental Allergens] : no environmental allergens [Adenoidectomy] : no adenoidectomy [Allergies] : no allergies [Cystic Fibrosis] : no cystic fibrosis [Asthma] : no asthma [Kartagener Syndrome] : no kartagener syndrome [Otalgia] : no otalgia [Neck Redness] : no neck redness [Neck Mass] : no neck mass [Localized Warmth] : no localized warmth [Neck Pain] : no neck pain [Difficulty Swallowing] : no difficulty swallowing [Painful Swallowing] : no painful swallowing [Fever] : no fever [Chills] : no chills [Rash] : no rash [Cold Intolerance] : no cold intolerance [Cough] : no cough [Fatigue] : no fatigue [Heat Intolerance] : no heat intolerance [Hyperthyroidism] : no hyperthyroidism [Malignancy] : no malignancy [Sarcoidosis] : no sarcoidosis [Sialadenitis] : no sialadenitis [Aortic Aneurysm] : no aortic aneurysm [Syphilis] : no syphilis [Carotid Artery Disease] : no carotid artery disease [Hodgkin Disease] : no hodgkin disease [HIV] : no HIV infection [Non-Hodgkin Lymphoma] : no non-hodgkin lymphoma [IV Drug Abuse] : no intravenous drug abuse [Tobacco Use] : no tobacco use [Thyroid Disease] : no thyroid disease [Impaired Immunity] : no impaired immunity [Alcohol Use] : no alcohol use [Radiation Exposure] : no radiation exposure

## 2019-05-08 NOTE — ASSESSMENT
[FreeTextEntry1] : Left Meniere's Disease\par Rec Diet\par Acupuncture\par f/u w/ Dr Granados at [facility] for dizziness\par \par After informed verbal consent is obtained, cerumen is removed from the right and left  ear canal with a curette and suction.\par Rx: \par Debrox was prescribed and  is to be placed in both ears on a routine basis to keep them free of wax.\par Routine debridement suggested to keep the ears free of wax.\par \par \par bilateral sensorineural hearing loss-cleared for hearing aids\par

## 2019-06-07 ENCOUNTER — APPOINTMENT (OUTPATIENT)
Dept: OTOLARYNGOLOGY | Facility: CLINIC | Age: 69
End: 2019-06-07

## 2019-06-12 ENCOUNTER — APPOINTMENT (OUTPATIENT)
Dept: CARDIOLOGY | Facility: CLINIC | Age: 69
End: 2019-06-12

## 2019-06-18 ENCOUNTER — APPOINTMENT (OUTPATIENT)
Dept: CARDIOLOGY | Facility: CLINIC | Age: 69
End: 2019-06-18
Payer: MEDICARE

## 2019-06-18 ENCOUNTER — NON-APPOINTMENT (OUTPATIENT)
Age: 69
End: 2019-06-18

## 2019-06-18 VITALS
BODY MASS INDEX: 24.25 KG/M2 | OXYGEN SATURATION: 99 % | HEART RATE: 68 BPM | WEIGHT: 160 LBS | HEIGHT: 68 IN | SYSTOLIC BLOOD PRESSURE: 159 MMHG | DIASTOLIC BLOOD PRESSURE: 82 MMHG

## 2019-06-18 PROCEDURE — 99214 OFFICE O/P EST MOD 30 MIN: CPT

## 2019-06-18 PROCEDURE — 93000 ELECTROCARDIOGRAM COMPLETE: CPT

## 2019-06-18 RX ORDER — ACETAZOLAMIDE 250 MG/1
250 TABLET ORAL TWICE DAILY
Qty: 180 | Refills: 0 | Status: DISCONTINUED | COMMUNITY
Start: 2018-10-17 | End: 2019-06-18

## 2019-06-18 RX ORDER — AMLODIPINE BESYLATE 2.5 MG/1
2.5 TABLET ORAL
Qty: 30 | Refills: 0 | Status: DISCONTINUED | COMMUNITY
Start: 2018-04-20 | End: 2019-06-18

## 2019-06-18 NOTE — DISCUSSION/SUMMARY
[Hypertension] : hypertension [Stable] : stable [Urine Metanephrine] : urine metanephrine [None] : none [Patient] : the patient [de-identified] : off amlodipine, currently on steroid taper which may be contributing to elevated blood pressure [de-identified] : mild sinus bradycardia occasionally with SVPB's and prolonged Event Recorder only revealed mild sinus bradycardia, occasional brief flurries of few beats of SVT, no prolonged episodes. [de-identified] : only reassurance seems necessary and there is no treatment for bradycardia and occasional supraventricular premature beats observed

## 2019-06-18 NOTE — PHYSICAL EXAM
[General Appearance - Well Developed] : well developed [Normal Appearance] : normal appearance [Well Groomed] : well groomed [General Appearance - Well Nourished] : well nourished [No Deformities] : no deformities [General Appearance - In No Acute Distress] : no acute distress [Normal Conjunctiva] : the conjunctiva exhibited no abnormalities [Eyelids - No Xanthelasma] : the eyelids demonstrated no xanthelasmas [Normal Oral Mucosa] : normal oral mucosa [No Oral Pallor] : no oral pallor [No Oral Cyanosis] : no oral cyanosis [Normal Jugular Venous A Waves Present] : normal jugular venous A waves present [Normal Jugular Venous V Waves Present] : normal jugular venous V waves present [No Jugular Venous Turner A Waves] : no jugular venous turner A waves [Respiration, Rhythm And Depth] : normal respiratory rhythm and effort [Exaggerated Use Of Accessory Muscles For Inspiration] : no accessory muscle use [Auscultation Breath Sounds / Voice Sounds] : lungs were clear to auscultation bilaterally [5th Left ICS - MCL] : palpated at the 5th LICS in the midclavicular line [Normal] : normal [Normal Rate] : normal [Premature Beats] : regular with premature beats [Normal S1] : normal S1 [Normal S2] : normal S2 [No Gallop] : no gallop heard [No Murmur] : no murmurs heard [2+] : left 2+ [No Abnormalities] : the abdominal aorta was not enlarged and no bruit was heard [No Pitting Edema] : no pitting edema present [Abdomen Soft] : soft [Abdomen Tenderness] : non-tender [Abdomen Mass (___ Cm)] : no abdominal mass palpated [Abnormal Walk] : normal gait [Gait - Sufficient For Exercise Testing] : the gait was sufficient for exercise testing [Nail Clubbing] : no clubbing of the fingernails [Cyanosis, Localized] : no localized cyanosis [Petechial Hemorrhages (___cm)] : no petechial hemorrhages [Skin Color & Pigmentation] : normal skin color and pigmentation [] : no rash [No Venous Stasis] : no venous stasis [Skin Lesions] : no skin lesions [No Skin Ulcers] : no skin ulcer [No Xanthoma] : no  xanthoma was observed [Oriented To Time, Place, And Person] : oriented to person, place, and time [Affect] : the affect was normal [Mood] : the mood was normal [No Anxiety] : not feeling anxious [Click] : no click [Right Carotid Bruit] : no bruit heard over the right carotid [Left Carotid Bruit] : no bruit heard over the left carotid

## 2019-06-18 NOTE — HISTORY OF PRESENT ILLNESS
[FreeTextEntry1] : Mrs. Chika Draper is a 68-year-old woman evaluated in the past for mild sick sinus syndrome with tendency to bradycardia and felt to have no significant cardiac disease. After a busy day at work onset of sense that her vision was "swimming" and she tolerated for several hours, but when it persisted the following morning went to the emergency room. She had a complete neurological evaluation and MRI/MRA and was concluded not to have any central nervous system abnormality. She was observed for bradycardia in the CDU and had an echocardiogram that was entirely normal. She was discharged and advised to followup with cardiologist.\par \par Remained well for a time, then sudden episode of profound weakness. Could barely stand up, unable to get to car. Called ambulance and went to [facility] ER. Blood pressure elevated in range of 200/100, but declined without specific treatment. CT head, MRI, complete blood tests all normal and then cardiac echo normal as well including normal bubble study. Had.renal sonogram and urine for metanephrines and saw Dr. Roach in office. Has had further neurology evaluation and presumed diagnosis of Meniere's and had monthly severe vertiginous spells. Neurologist proposed starting acetazolamide, then increased and recommends another increase.. Event Monitor proved unremarkable.\par \par Again well for a time, through past winter, then episodes of upper respiratory infection, concern for ear infection, on antibiotic twice and then couple more episodes and [facility] and amlodipine stopped.

## 2019-09-26 ENCOUNTER — APPOINTMENT (OUTPATIENT)
Dept: OTOLARYNGOLOGY | Facility: CLINIC | Age: 69
End: 2019-09-26

## 2019-10-17 ENCOUNTER — APPOINTMENT (OUTPATIENT)
Dept: CARDIOLOGY | Facility: CLINIC | Age: 69
End: 2019-10-17
Payer: MEDICARE

## 2019-10-17 ENCOUNTER — NON-APPOINTMENT (OUTPATIENT)
Age: 69
End: 2019-10-17

## 2019-10-17 VITALS
HEART RATE: 71 BPM | DIASTOLIC BLOOD PRESSURE: 70 MMHG | WEIGHT: 160 LBS | BODY MASS INDEX: 24.33 KG/M2 | SYSTOLIC BLOOD PRESSURE: 110 MMHG | OXYGEN SATURATION: 94 %

## 2019-10-17 PROCEDURE — 99214 OFFICE O/P EST MOD 30 MIN: CPT

## 2019-10-17 PROCEDURE — 93000 ELECTROCARDIOGRAM COMPLETE: CPT

## 2019-10-17 RX ORDER — NORTRIPTYLINE HYDROCHLORIDE 10 MG/1
10 CAPSULE ORAL
Refills: 0 | Status: ACTIVE | COMMUNITY
Start: 2019-06-18

## 2019-10-17 NOTE — PHYSICAL EXAM
[General Appearance - Well Developed] : well developed [Normal Appearance] : normal appearance [Well Groomed] : well groomed [General Appearance - Well Nourished] : well nourished [Normal Conjunctiva] : the conjunctiva exhibited no abnormalities [General Appearance - In No Acute Distress] : no acute distress [No Deformities] : no deformities [Eyelids - No Xanthelasma] : the eyelids demonstrated no xanthelasmas [No Oral Pallor] : no oral pallor [Normal Oral Mucosa] : normal oral mucosa [No Oral Cyanosis] : no oral cyanosis [Normal Jugular Venous A Waves Present] : normal jugular venous A waves present [No Jugular Venous Utrner A Waves] : no jugular venous turner A waves [Normal Jugular Venous V Waves Present] : normal jugular venous V waves present [Respiration, Rhythm And Depth] : normal respiratory rhythm and effort [Exaggerated Use Of Accessory Muscles For Inspiration] : no accessory muscle use [5th Left ICS - MCL] : palpated at the 5th LICS in the midclavicular line [Auscultation Breath Sounds / Voice Sounds] : lungs were clear to auscultation bilaterally [Normal] : normal [Normal Rate] : normal [Premature Beats] : regular with premature beats [Normal S1] : normal S1 [Normal S2] : normal S2 [No Gallop] : no gallop heard [No Murmur] : no murmurs heard [2+] : left 2+ [No Pitting Edema] : no pitting edema present [No Abnormalities] : the abdominal aorta was not enlarged and no bruit was heard [Abdomen Soft] : soft [Abdomen Tenderness] : non-tender [Abdomen Mass (___ Cm)] : no abdominal mass palpated [Abnormal Walk] : normal gait [Nail Clubbing] : no clubbing of the fingernails [Cyanosis, Localized] : no localized cyanosis [Gait - Sufficient For Exercise Testing] : the gait was sufficient for exercise testing [Petechial Hemorrhages (___cm)] : no petechial hemorrhages [Skin Color & Pigmentation] : normal skin color and pigmentation [] : no rash [No Venous Stasis] : no venous stasis [Skin Lesions] : no skin lesions [No Skin Ulcers] : no skin ulcer [No Xanthoma] : no  xanthoma was observed [Oriented To Time, Place, And Person] : oriented to person, place, and time [Affect] : the affect was normal [Mood] : the mood was normal [No Anxiety] : not feeling anxious [Click] : no click [Left Carotid Bruit] : no bruit heard over the left carotid [Right Carotid Bruit] : no bruit heard over the right carotid

## 2019-10-17 NOTE — DISCUSSION/SUMMARY
[Hypertension] : hypertension [Stable] : stable [Urine Metanephrine] : urine metanephrine [None] : none [Patient] : the patient [Echocardiogram] : an echocardiogram [de-identified] : mild sinus bradycardia occasionally with SVPB's and prolonged Event Recorder only revealed mild sinus bradycardia, occasional brief flurries of few beats of SVT, no prolonged episodes. [de-identified] : Renal sonogram, normal size kidneys, normal renal artery velocities, cysts [de-identified] : only reassurance seems necessary and there is no treatment for bradycardia and occasional supraventricular premature beats observed [FreeTextEntry2] : and

## 2019-10-17 NOTE — HISTORY OF PRESENT ILLNESS
[FreeTextEntry1] : Mrs. Chika Draper is a 69-year-old woman evaluated in the past for mild sick sinus syndrome with tendency to bradycardia and felt to have no significant cardiac disease. After a busy day at work onset of sense that her vision was "swimming" and she tolerated for several hours, but when it persisted the following morning went to the emergency room. She had a complete neurological evaluation and MRI/MRA and was concluded not to have any central nervous system abnormality. She was observed for bradycardia in the CDU and had an echocardiogram that was entirely normal. She was discharged and advised to followup with cardiologist.\par \par Remained well for a time, then sudden episode of profound weakness. Could barely stand up, unable to get to car. Called ambulance and went to [facility] ER. Blood pressure elevated in range of 200/100, but declined without specific treatment. CT head, MRI, complete blood tests all normal and then cardiac echo normal as well including normal bubble study. Had.renal sonogram and urine for metanephrines and saw Dr. Roach in office. Has had further neurology evaluation and presumed diagnosis of Meniere's and had monthly severe vertiginous spells. Neurologist proposed starting acetazolamide, then increased . 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  She is no longer on Diamox and is now on spironolactone, but episodes of vertigo and poor balance persist.  There have been no palpitations and no syncope.

## 2019-12-05 ENCOUNTER — APPOINTMENT (OUTPATIENT)
Dept: OTOLARYNGOLOGY | Facility: CLINIC | Age: 69
End: 2019-12-05

## 2020-03-19 ENCOUNTER — APPOINTMENT (OUTPATIENT)
Dept: RADIOLOGY | Facility: IMAGING CENTER | Age: 70
End: 2020-03-19

## 2020-04-02 ENCOUNTER — APPOINTMENT (OUTPATIENT)
Dept: CARDIOLOGY | Facility: CLINIC | Age: 70
End: 2020-04-02

## 2020-05-06 ENCOUNTER — APPOINTMENT (OUTPATIENT)
Dept: OTOLARYNGOLOGY | Facility: CLINIC | Age: 70
End: 2020-05-06

## 2020-07-20 ENCOUNTER — INPATIENT (INPATIENT)
Facility: HOSPITAL | Age: 70
LOS: 1 days | Discharge: HOME | DRG: 309 | End: 2020-07-22
Attending: INTERNAL MEDICINE | Admitting: INTERNAL MEDICINE
Payer: MEDICARE

## 2020-07-20 VITALS
HEART RATE: 119 BPM | RESPIRATION RATE: 18 BRPM | TEMPERATURE: 98 F | WEIGHT: 162.92 LBS | OXYGEN SATURATION: 98 % | HEIGHT: 68 IN | SYSTOLIC BLOOD PRESSURE: 146 MMHG | DIASTOLIC BLOOD PRESSURE: 85 MMHG

## 2020-07-20 DIAGNOSIS — H81.09 MENIERE'S DISEASE, UNSPECIFIED EAR: ICD-10-CM

## 2020-07-20 DIAGNOSIS — Z29.9 ENCOUNTER FOR PROPHYLACTIC MEASURES, UNSPECIFIED: ICD-10-CM

## 2020-07-20 DIAGNOSIS — R00.0 TACHYCARDIA, UNSPECIFIED: ICD-10-CM

## 2020-07-20 DIAGNOSIS — E85.9 AMYLOIDOSIS, UNSPECIFIED: ICD-10-CM

## 2020-07-20 LAB
ALBUMIN SERPL ELPH-MCNC: 4.4 G/DL — SIGNIFICANT CHANGE UP (ref 3.3–5)
ALP SERPL-CCNC: 63 U/L — SIGNIFICANT CHANGE UP (ref 40–120)
ALT FLD-CCNC: 11 U/L — SIGNIFICANT CHANGE UP (ref 10–45)
ANION GAP SERPL CALC-SCNC: 10 MMOL/L — SIGNIFICANT CHANGE UP (ref 5–17)
APPEARANCE UR: CLEAR — SIGNIFICANT CHANGE UP
AST SERPL-CCNC: 20 U/L — SIGNIFICANT CHANGE UP (ref 10–40)
BACTERIA # UR AUTO: NEGATIVE — SIGNIFICANT CHANGE UP
BASOPHILS # BLD AUTO: 0.04 K/UL — SIGNIFICANT CHANGE UP (ref 0–0.2)
BASOPHILS NFR BLD AUTO: 0.7 % — SIGNIFICANT CHANGE UP (ref 0–2)
BILIRUB SERPL-MCNC: 0.4 MG/DL — SIGNIFICANT CHANGE UP (ref 0.2–1.2)
BILIRUB UR-MCNC: NEGATIVE — SIGNIFICANT CHANGE UP
BUN SERPL-MCNC: 13 MG/DL — SIGNIFICANT CHANGE UP (ref 7–23)
CALCIUM SERPL-MCNC: 9.4 MG/DL — SIGNIFICANT CHANGE UP (ref 8.4–10.5)
CHLORIDE SERPL-SCNC: 102 MMOL/L — SIGNIFICANT CHANGE UP (ref 96–108)
CO2 SERPL-SCNC: 24 MMOL/L — SIGNIFICANT CHANGE UP (ref 22–31)
COLOR SPEC: SIGNIFICANT CHANGE UP
CREAT SERPL-MCNC: 0.9 MG/DL — SIGNIFICANT CHANGE UP (ref 0.5–1.3)
DIFF PNL FLD: NEGATIVE — SIGNIFICANT CHANGE UP
EOSINOPHIL # BLD AUTO: 0.11 K/UL — SIGNIFICANT CHANGE UP (ref 0–0.5)
EOSINOPHIL NFR BLD AUTO: 1.8 % — SIGNIFICANT CHANGE UP (ref 0–6)
EPI CELLS # UR: 0 /HPF — SIGNIFICANT CHANGE UP
GLUCOSE SERPL-MCNC: 105 MG/DL — HIGH (ref 70–99)
GLUCOSE UR QL: NEGATIVE — SIGNIFICANT CHANGE UP
HCT VFR BLD CALC: 43.7 % — SIGNIFICANT CHANGE UP (ref 34.5–45)
HGB BLD-MCNC: 14.8 G/DL — SIGNIFICANT CHANGE UP (ref 11.5–15.5)
HYALINE CASTS # UR AUTO: 0 /LPF — SIGNIFICANT CHANGE UP (ref 0–2)
IMM GRANULOCYTES NFR BLD AUTO: 0.5 % — SIGNIFICANT CHANGE UP (ref 0–1.5)
KETONES UR-MCNC: NEGATIVE — SIGNIFICANT CHANGE UP
LEUKOCYTE ESTERASE UR-ACNC: NEGATIVE — SIGNIFICANT CHANGE UP
LYMPHOCYTES # BLD AUTO: 1.59 K/UL — SIGNIFICANT CHANGE UP (ref 1–3.3)
LYMPHOCYTES # BLD AUTO: 26.1 % — SIGNIFICANT CHANGE UP (ref 13–44)
MAGNESIUM SERPL-MCNC: 2 MG/DL — SIGNIFICANT CHANGE UP (ref 1.6–2.6)
MCHC RBC-ENTMCNC: 30.2 PG — SIGNIFICANT CHANGE UP (ref 27–34)
MCHC RBC-ENTMCNC: 33.9 GM/DL — SIGNIFICANT CHANGE UP (ref 32–36)
MCV RBC AUTO: 89.2 FL — SIGNIFICANT CHANGE UP (ref 80–100)
MONOCYTES # BLD AUTO: 0.46 K/UL — SIGNIFICANT CHANGE UP (ref 0–0.9)
MONOCYTES NFR BLD AUTO: 7.5 % — SIGNIFICANT CHANGE UP (ref 2–14)
NEUTROPHILS # BLD AUTO: 3.87 K/UL — SIGNIFICANT CHANGE UP (ref 1.8–7.4)
NEUTROPHILS NFR BLD AUTO: 63.4 % — SIGNIFICANT CHANGE UP (ref 43–77)
NITRITE UR-MCNC: NEGATIVE — SIGNIFICANT CHANGE UP
NRBC # BLD: 0 /100 WBCS — SIGNIFICANT CHANGE UP (ref 0–0)
NT-PROBNP SERPL-SCNC: 386 PG/ML — HIGH (ref 0–300)
PH UR: 8 — SIGNIFICANT CHANGE UP (ref 5–8)
PHOSPHATE SERPL-MCNC: 2.8 MG/DL — SIGNIFICANT CHANGE UP (ref 2.5–4.5)
PLATELET # BLD AUTO: 207 K/UL — SIGNIFICANT CHANGE UP (ref 150–400)
POTASSIUM SERPL-MCNC: 3.8 MMOL/L — SIGNIFICANT CHANGE UP (ref 3.5–5.3)
POTASSIUM SERPL-SCNC: 3.8 MMOL/L — SIGNIFICANT CHANGE UP (ref 3.5–5.3)
PROT SERPL-MCNC: 7.2 G/DL — SIGNIFICANT CHANGE UP (ref 6–8.3)
PROT UR-MCNC: SIGNIFICANT CHANGE UP
RBC # BLD: 4.9 M/UL — SIGNIFICANT CHANGE UP (ref 3.8–5.2)
RBC # FLD: 12 % — SIGNIFICANT CHANGE UP (ref 10.3–14.5)
RBC CASTS # UR COMP ASSIST: 1 /HPF — SIGNIFICANT CHANGE UP (ref 0–4)
SARS-COV-2 RNA SPEC QL NAA+PROBE: SIGNIFICANT CHANGE UP
SODIUM SERPL-SCNC: 136 MMOL/L — SIGNIFICANT CHANGE UP (ref 135–145)
SP GR SPEC: 1.01 — LOW (ref 1.01–1.02)
TROPONIN T, HIGH SENSITIVITY RESULT: 10 NG/L — SIGNIFICANT CHANGE UP (ref 0–51)
TROPONIN T, HIGH SENSITIVITY RESULT: 9 NG/L — SIGNIFICANT CHANGE UP (ref 0–51)
UROBILINOGEN FLD QL: NEGATIVE — SIGNIFICANT CHANGE UP
WBC # BLD: 6.1 K/UL — SIGNIFICANT CHANGE UP (ref 3.8–10.5)
WBC # FLD AUTO: 6.1 K/UL — SIGNIFICANT CHANGE UP (ref 3.8–10.5)
WBC UR QL: 0 /HPF — SIGNIFICANT CHANGE UP (ref 0–5)

## 2020-07-20 PROCEDURE — 93010 ELECTROCARDIOGRAM REPORT: CPT

## 2020-07-20 PROCEDURE — 71045 X-RAY EXAM CHEST 1 VIEW: CPT | Mod: 26

## 2020-07-20 PROCEDURE — 99223 1ST HOSP IP/OBS HIGH 75: CPT

## 2020-07-20 PROCEDURE — 99285 EMERGENCY DEPT VISIT HI MDM: CPT | Mod: CS

## 2020-07-20 RX ORDER — SODIUM CHLORIDE 9 MG/ML
1000 INJECTION INTRAMUSCULAR; INTRAVENOUS; SUBCUTANEOUS ONCE
Refills: 0 | Status: COMPLETED | OUTPATIENT
Start: 2020-07-20 | End: 2020-07-20

## 2020-07-20 RX ORDER — SPIRONOLACTONE 25 MG/1
25 TABLET, FILM COATED ORAL DAILY
Refills: 0 | Status: DISCONTINUED | OUTPATIENT
Start: 2020-07-20 | End: 2020-07-22

## 2020-07-20 RX ORDER — NORTRIPTYLINE HYDROCHLORIDE 10 MG/1
40 CAPSULE ORAL AT BEDTIME
Refills: 0 | Status: DISCONTINUED | OUTPATIENT
Start: 2020-07-20 | End: 2020-07-22

## 2020-07-20 RX ORDER — TOPIRAMATE 25 MG
100 TABLET ORAL DAILY
Refills: 0 | Status: DISCONTINUED | OUTPATIENT
Start: 2020-07-20 | End: 2020-07-22

## 2020-07-20 RX ORDER — SODIUM CHLORIDE 9 MG/ML
1000 INJECTION, SOLUTION INTRAVENOUS
Refills: 0 | Status: DISCONTINUED | OUTPATIENT
Start: 2020-07-20 | End: 2020-07-22

## 2020-07-20 RX ADMIN — SODIUM CHLORIDE 1000 MILLILITER(S): 9 INJECTION INTRAMUSCULAR; INTRAVENOUS; SUBCUTANEOUS at 11:42

## 2020-07-20 RX ADMIN — NORTRIPTYLINE HYDROCHLORIDE 40 MILLIGRAM(S): 10 CAPSULE ORAL at 22:19

## 2020-07-20 RX ADMIN — SODIUM CHLORIDE 100 MILLILITER(S): 9 INJECTION, SOLUTION INTRAVENOUS at 16:01

## 2020-07-20 RX ADMIN — Medication 100 MILLIGRAM(S): at 22:20

## 2020-07-20 NOTE — ED PROVIDER NOTE - PHYSICAL EXAMINATION
CONSTITUTIONAL: Patient is awake, alert and oriented x 3. Patient is well appearing and in no acute distress.  HEAD: NCAT,   EYES: PERRL b/l, EOMI,   NECK: supple, FROM   LUNGS: CTA B/L,  HEART: Tachy, RR.+S1S2 no murmurs,   ABDOMEN: Soft nd/nt, no rebound or guarding.   EXTREMITY: no edema or calf tenderness b/l, FROM upper and lower ext b/l  SKIN: with no rash or lesions.   NEURO: Cn3-12 grossly intact. Strength5/5UE/LE.NmlSensation.

## 2020-07-20 NOTE — H&P ADULT - PROBLEM SELECTOR PLAN 3
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

## 2020-07-20 NOTE — ED PROVIDER NOTE - OBJECTIVE STATEMENT
70 year old female with pmhx Meniere's, Amyloidosis presents to ED c/o dizziness and lightheaded sensation this am. Pt states she woke up around 530 this am  after having "strange dream that she was going to have surgery today"  which is atypical for her and realized she had urinated while sleeping which is also atypical. At that time pt states she felt lightheaded but fell back asleep after using bathroom. When patient woke up again reports persistent lightheaded sensation and tachycardia. Denies syncope, fevers, chills, sick contacts, chest pain, sob, abd pain, n/v/d, tremors, biting of tongue, post-ictal state.

## 2020-07-20 NOTE — ED PROVIDER NOTE - CLINICAL SUMMARY MEDICAL DECISION MAKING FREE TEXT BOX
DONALDO Gutierrez MD: Pt is a 69 y/o female with pmhx Meniere's, Amyloidosis who presents to ED c/o dizziness, tachycardia and near syncopal sensation this AM. Pt states she woke up around 530 this am after having "strange dream that she was going to have surgery today"  which is atypical for her and realized she had urinated while sleeping which is also atypical. At that time pt states she felt lightheaded but fell back asleep after using bathroom. When patient woke up again reports persistent lightheaded sensation and tachycardia. States that when she went from supine to sitting up, she felt like she was going to pass out. Denies seizure activity, tongue biting, recent trauma. Pt denies: chest pain, SOB, cough, fevers, chills, pleuritic chest pain, abdominal pain, n/v/d, back pain, neck pain, HA, neck stiffness, focal numbness or weakness, visual changes, leg pain/swelling, recent travel, recent trauma, recent immobilization, dysuria, hematuria, rash. Ddx includes, however, is not limited to: arrhythmia, dehydration, orthostasis, uti, cardiac d/o, other. Plan: basic labs, cardiac labs, ekg, CXR, orthostatics, IVF

## 2020-07-20 NOTE — H&P ADULT - ASSESSMENT
70 year old female with pmhx Meniere's, pulmonary amyloidosis presents to ED c/o dizziness and lightheaded this morning admitted for sinus tachycardia.

## 2020-07-20 NOTE — ED PROVIDER NOTE - ATTENDING CONTRIBUTION TO CARE
I saw and evaluated patient with ACP. I discussed H+P and MDM with ACP. I agree with the statements made by the ACP unless otherwise noted.    The care of this patient was in support of the [facility] countermeasures to Covid-19.

## 2020-07-20 NOTE — H&P ADULT - NSHPLABSRESULTS_GEN_ALL_CORE
.  LABS:                         14.8   6.10  )-----------( 207      ( 2020 11:11 )             43.7     -    136  |  102  |  13  ----------------------------<  105<H>  3.8   |  24  |  0.90    Ca    9.4      2020 11:11  Phos  2.8       Mg     2.0         TPro  7.2  /  Alb  4.4  /  TBili  0.4  /  DBili  x   /  AST  20  /  ALT  11  /  AlkPhos  63        Urinalysis Basic - ( 2020 12:05 )    Color: Light Yellow / Appearance: Clear / S.009 / pH: x  Gluc: x / Ketone: Negative  / Bili: Negative / Urobili: Negative   Blood: x / Protein: Trace / Nitrite: Negative   Leuk Esterase: Negative / RBC: 1 /hpf / WBC 0 /HPF   Sq Epi: x / Non Sq Epi: 0 /hpf / Bacteria: Negative      Serum Pro-Brain Natriuretic Peptide: 386 pg/mL ( @ 11:11)        RADIOLOGY, EKG & ADDITIONAL TESTS: Reviewed.

## 2020-07-20 NOTE — H&P ADULT - PROBLEM SELECTOR PLAN 1
- ekg confirms sinus tachycardia  - orthostatics negative  - echocardiogram performed in 2/2020 which was unremarkable  - will continue to hydrate with LR at 75 cc/hr  - c/w telemetry  - monitor mg, K daily  - cardiology consulted - ekg confirms sinus tachycardia  - orthostatics negative  - echocardiogram performed in 2/2020 which was unremarkable; will repeat now  - tsh ordered for the am  - will continue to hydrate with LR at 75 cc/hr  - c/w telemetry  - monitor mg, K daily  - cardiology consulted

## 2020-07-20 NOTE — H&P ADULT - HISTORY OF PRESENT ILLNESS
70 year old female with pmhx Meniere's, pulmonary amyloidosis presents to ED c/o dizziness and lightheaded this morning. Patient states she woke up around 530 this am after having a strange dream and realized she had urinated while sleeping. At that time pt states she felt lightheaded but fell back asleep after using bathroom. When patient woke up again reports persistent lightheadedness and palpitations. Denies syncope, fevers, chills, sick contacts, chest pain, sob, abd pain, n/v/d, tremors, biting of tongue, post-ictal state. No recent travel or sick contacts. She lives with her  and son but they go outside minimally.

## 2020-07-20 NOTE — CONSULT NOTE ADULT - SUBJECTIVE AND OBJECTIVE BOX
Patient is a 70y old  Female who presents with a chief complaint of     HPI:    Pt is a 70 year old woman with PMHx mild sick sinus syndrome (tendency for bradycardia), amyloidosis, Meniere's presenting after an episode of lightheadedness/dizziness. Says this felt different than her normal dizziness from vertigo/Meniere's. She awoke around 530am and noticed she felt dizzy and lightheaded. Fell back asleep after using the bathroom, then woke back up with a similar feeling. However, she also felt tachycardic after waking up the second time. Also felt like she was going to pass out after sitting upwards. She knows she is usually bradycardic, so decided to come to the ED. She denies chest pain, palpitations, SOB, CHAMORRO, LE swelling, diaphoresis. Of note, says she had extensive workup for amyloidosis, and systemic involvement ruled out.      PAST MEDICAL & SURGICAL HISTORY:  Bradycardia  Tinnitus  Vertigo  GERD (gastroesophageal reflux disease)  No significant past surgical history        MEDICATIONS  (STANDING):  nortriptyline 40 milliGRAM(s) Oral at bedtime  spironolactone 25 milliGRAM(s) Oral daily  topiramate 100 milliGRAM(s) Oral daily    MEDICATIONS  (PRN):      FAMILY HISTORY:  No pertinent family history in first degree relatives          REVIEW OF SYSTEMS    CONSTITUTIONAL: No weakness, fevers or chills  EYES/ENT: No visual changes;  No vertigo or throat pain   NECK: No pain or stiffness  RESPIRATORY: No cough, wheezing, hemoptysis; No shortness of breath  CARDIOVASCULAR: No chest pain or palpitations  GASTROINTESTINAL: No abdominal or epigastric pain. No nausea, vomiting, or hematemesis; No diarrhea or constipation. No melena or hematochezia.  GENITOURINARY: No dysuria, frequency or hematuria  NEUROLOGICAL: No numbness or weakness  SKIN: No itching, rashes      Allergic/Immunologic: NKA      CIGARETTES: none    ALCOHOL: none    Vital Signs Last 24 Hrs  T(C): 37.1 (2020 13:51), Max: 37.1 (2020 13:51)  T(F): 98.7 (2020 13:51), Max: 98.7 (2020 13:51)  HR: 118 (2020 13:51) (118 - 120)  BP: 141/73 (2020 13:51) (141/73 - 146/101)  BP(mean): --  RR: 18 (2020 13:51) (18 - 18)  SpO2: 96% (2020 13:51) (96% - 99%)    PHYSICAL EXAM:    General: NAD  Neurology: A&Ox3, nonfocal  Respiratory: CTABL  CV: RRR, S1S2, no murmurs  GI: abdomen soft, NT, ND, +BS  Extremities: No edema, + peripheral pulses  Skin: warm and dry      ECG: sinus tachycardia  with PAC [id number]    I&O's Detail      LABS:                        14.8   6.10  )-----------( 207      ( 2020 11:11 )             43.7     07-20    136  |  102  |  13  ----------------------------<  105<H>  3.8   |  24  |  0.90    Ca    9.4      2020 11:11  Phos  2.8     07-20  Mg     2.0     07-20    TPro  7.2  /  Alb  4.4  /  TBili  0.4  /  DBili  x   /  AST  20  /  ALT  11  /  AlkPhos  63  07-20      Urinalysis Basic - ( 2020 12:05 )    Color: Light Yellow / Appearance: Clear / S.009 / pH: x  Gluc: x / Ketone: Negative  / Bili: Negative / Urobili: Negative   Blood: x / Protein: Trace / Nitrite: Negative   Leuk Esterase: Negative / RBC: 1 /hpf / WBC 0 /HPF   Sq Epi: x / Non Sq Epi: 0 /hpf / Bacteria: Negative      I&O's Summary    BNPSerum Pro-Brain Natriuretic Peptide: 386 pg/mL ( @ 11:11)    RADIOLOGY & ADDITIONAL STUDIES:    < from: Transthoracic Echocardiogram (18 @ 08:05) >  DIMENSIONS:  Dimensions:     Normal Values:  LA:     3.6 cm    2.0 - 4.0 cm  Ao:     2.7 cm    2.0 - 3.8 cm  SEPTUM: 0.7 cm    0.6 - 1.2 cm  PWT:    0.7 cm    0.6 - 1.1 cm  LVIDd:  4.7 cm    3.0 - 5.6 cm  LVIDs:  3.0 cm    1.8 - 4.0 cm  Derived Variables:  LVMI: 53 g/m2  RWT: 0.29  Fractional short: 36 %  Ejection Fraction (Teicholtz): 66 %  ------------------------------------------------------------------------  OBSERVATIONS:  Mitral Valve: Mitral annular calcification, otherwise  normal mitral valve. Mild mitral regurgitation.  Aortic Root: Normal aortic root.  Aortic Valve: Calcified trileaflet aortic valve with normal  opening.  Left Atrium: Moderately dilated left atrium.  LA volume  index = 42 cc/m2.  Left Ventricle: Normal left ventricular systolic function.  No segmental wall motion abnormalities. Normal left  ventricular internal dimensions and wall thicknesses.  Right Heart: Normal right atrium. Normal right ventricular  size and function. Normal tricuspid valve. Mild tricuspid  regurgitation. Normal pulmonic valve.  Pericardium/PleuraNormal pericardium with no pericardial  effusion.  Hemodynamic: Estimated right ventricular systolic pressure  equals 42 mm Hg, assuming right atrial pressure equals 10  mm Hg, consistent with mild pulmonary hypertension.  ------------------------------------------------------------------------  CONCLUSIONS:  1. Mitral annular calcification, otherwise normal mitral  valve. Mild mitral regurgitation.  2. Moderately dilated left atrium.  LA volume index = 42  cc/m2.  3. Normal left ventricular internal dimensions and wall  thicknesses.  4. Normal left ventricular systolic function. No segmental  wall motion abnormalities.  5. Normal right ventricular size and function.  6. Estimated right ventricular systolic pressure equals 42  mm Hg, assuming right atrial pressure equals 10 mm Hg,  consistent with mild pulmonary hypertension.  7. A bubble study was performed with the intravenous  injection of agitated saline contrast.  Following contrast  injection, no obvious bubbles were seen in the left heart.  ------------------------------------------------------------------------  Confirmed on  3/2/2018 - 09:52:31 by Angel Spears M.D.  ------------------------------------------------------------------------      < end of copied text >    -Event monitoring in 2019 showing occasional SVPB's and brief flurries of slow SVT and no atrial fibrillation) Patient is a 70y old  Female who presents with a chief complaint of     HPI:    Pt is a 70 year old woman with PMHx mild sick sinus syndrome (tendency for bradycardia), amyloidosis, Meniere's presenting after an episode of lightheadedness/dizziness. Says this felt different than her normal dizziness from vertigo/Meniere's. She awoke around 530am and noticed she felt dizzy and lightheaded. Fell back asleep after using the bathroom, then woke back up with a similar feeling. However, she also felt tachycardic after waking up the second time. Also felt like she was going to pass out after sitting upwards. She knows she is usually bradycardic, so decided to come to the ED. She denies chest pain, palpitations, SOB, CHAMORRO, LE swelling, diaphoresis.     Of note, says she had extensive workup for amyloidosis, proven on lung bx in 10/2019; AL-lambda by mass spec. SPEP, UPEP showed no monoclonal protein. Fat pad bx was negative for congo red staining. It was determined she only had pulmonary, with no systemic involvement of her amyloid.      PAST MEDICAL & SURGICAL HISTORY:  Bradycardia  Tinnitus  Vertigo  GERD (gastroesophageal reflux disease)  No significant past surgical history        MEDICATIONS  (STANDING):  nortriptyline 40 milliGRAM(s) Oral at bedtime  spironolactone 25 milliGRAM(s) Oral daily  topiramate 100 milliGRAM(s) Oral daily    MEDICATIONS  (PRN):      FAMILY HISTORY:  No pertinent family history in first degree relatives          REVIEW OF SYSTEMS    CONSTITUTIONAL: No weakness, fevers or chills  EYES/ENT: No visual changes;  No vertigo or throat pain   NECK: No pain or stiffness  RESPIRATORY: No cough, wheezing, hemoptysis; No shortness of breath  CARDIOVASCULAR: No chest pain or palpitations  GASTROINTESTINAL: No abdominal or epigastric pain. No nausea, vomiting, or hematemesis; No diarrhea or constipation. No melena or hematochezia.  GENITOURINARY: No dysuria, frequency or hematuria  NEUROLOGICAL: No numbness or weakness  SKIN: No itching, rashes      Allergic/Immunologic: NKA      CIGARETTES: none    ALCOHOL: none    Vital Signs Last 24 Hrs  T(C): 37.1 (2020 13:51), Max: 37.1 (2020 13:51)  T(F): 98.7 (2020 13:51), Max: 98.7 (2020 13:51)  HR: 118 (2020 13:51) (118 - 120)  BP: 141/73 (2020 13:51) (141/73 - 146/101)  BP(mean): --  RR: 18 (2020 13:51) (18 - 18)  SpO2: 96% (2020 13:51) (96% - 99%)    PHYSICAL EXAM:    General: NAD  Neurology: A&Ox3, nonfocal  Respiratory: CTABL  CV: RRR, S1S2, no murmurs  GI: abdomen soft, NT, ND, +BS  Extremities: No edema, + peripheral pulses  Skin: warm and dry      ECG: sinus tachycardia  with PAC [id number]    I&O's Detail      LABS:                        14.8   6.10  )-----------( 207      ( 2020 11:11 )             43.7     07-20    136  |  102  |  13  ----------------------------<  105<H>  3.8   |  24  |  0.90    Ca    9.4      2020 11:11  Phos  2.8     07-20  Mg     2.0     07-20    TPro  7.2  /  Alb  4.4  /  TBili  0.4  /  DBili  x   /  AST  20  /  ALT  11  /  AlkPhos  63  07-20      Urinalysis Basic - ( 2020 12:05 )    Color: Light Yellow / Appearance: Clear / S.009 / pH: x  Gluc: x / Ketone: Negative  / Bili: Negative / Urobili: Negative   Blood: x / Protein: Trace / Nitrite: Negative   Leuk Esterase: Negative / RBC: 1 /hpf / WBC 0 /HPF   Sq Epi: x / Non Sq Epi: 0 /hpf / Bacteria: Negative      I&O's Summary    BNPSerum Pro-Brain Natriuretic Peptide: 386 pg/mL ( @ 11:11)    RADIOLOGY & ADDITIONAL STUDIES:    < from: Transthoracic Echocardiogram (18 @ 08:05) >  DIMENSIONS:  Dimensions:     Normal Values:  LA:     3.6 cm    2.0 - 4.0 cm  Ao:     2.7 cm    2.0 - 3.8 cm  SEPTUM: 0.7 cm    0.6 - 1.2 cm  PWT:    0.7 cm    0.6 - 1.1 cm  LVIDd:  4.7 cm    3.0 - 5.6 cm  LVIDs:  3.0 cm    1.8 - 4.0 cm  Derived Variables:  LVMI: 53 g/m2  RWT: 0.29  Fractional short: 36 %  Ejection Fraction (Teicholtz): 66 %  ------------------------------------------------------------------------  OBSERVATIONS:  Mitral Valve: Mitral annular calcification, otherwise  normal mitral valve. Mild mitral regurgitation.  Aortic Root: Normal aortic root.  Aortic Valve: Calcified trileaflet aortic valve with normal  opening.  Left Atrium: Moderately dilated left atrium.  LA volume  index = 42 cc/m2.  Left Ventricle: Normal left ventricular systolic function.  No segmental wall motion abnormalities. Normal left  ventricular internal dimensions and wall thicknesses.  Right Heart: Normal right atrium. Normal right ventricular  size and function. Normal tricuspid valve. Mild tricuspid  regurgitation. Normal pulmonic valve.  Pericardium/PleuraNormal pericardium with no pericardial  effusion.  Hemodynamic: Estimated right ventricular systolic pressure  equals 42 mm Hg, assuming right atrial pressure equals 10  mm Hg, consistent with mild pulmonary hypertension.  ------------------------------------------------------------------------  CONCLUSIONS:  1. Mitral annular calcification, otherwise normal mitral  valve. Mild mitral regurgitation.  2. Moderately dilated left atrium.  LA volume index = 42  cc/m2.  3. Normal left ventricular internal dimensions and wall  thicknesses.  4. Normal left ventricular systolic function. No segmental  wall motion abnormalities.  5. Normal right ventricular size and function.  6. Estimated right ventricular systolic pressure equals 42  mm Hg, assuming right atrial pressure equals 10 mm Hg,  consistent with mild pulmonary hypertension.  7. A bubble study was performed with the intravenous  injection of agitated saline contrast.  Following contrast  injection, no obvious bubbles were seen in the left heart.  ------------------------------------------------------------------------  Confirmed on  3/2/2018 - 09:52:31 by Angel Spears M.D.  ------------------------------------------------------------------------      < end of copied text >    -Event monitoring in 2019 showing occasional SVPB's and brief flurries of slow SVT and no atrial fibrillation) Patient is a 70y old  Female who presents with a chief complaint of     HPI:    Pt is a 70 year old woman with PMHx mild sick sinus syndrome (tendency for bradycardia), amyloidosis, Meniere's presenting after an episode of lightheadedness/dizziness. Says this felt different than her normal dizziness from vertigo/Meniere's. She awoke around 530am and noticed she felt dizzy and lightheaded. Fell back asleep after using the bathroom, then woke back up with a similar feeling. However, she also felt tachycardic after waking up the second time. Also felt like she was going to pass out after sitting upwards. She knows she is usually bradycardic, so decided to come to the ED. She denies chest pain, palpitations, SOB, CHAMORRO, LE swelling, diaphoresis.     Of note, says she had extensive workup for amyloidosis, proven on lung bx in 10/2019; AL-lambda by mass spec. SPEP, UPEP showed no monoclonal protein. Fat pad bx was negative for congo red staining. It was determined she only had pulmonary, with no systemic involvement of her amyloid.      PAST MEDICAL & SURGICAL HISTORY:  Bradycardia  Tinnitus  Vertigo  GERD (gastroesophageal reflux disease)  No significant past surgical history        MEDICATIONS  (STANDING):  nortriptyline 40 milliGRAM(s) Oral at bedtime  spironolactone 25 milliGRAM(s) Oral daily  topiramate 100 milliGRAM(s) Oral daily    MEDICATIONS  (PRN):      FAMILY HISTORY:  No pertinent family history in first degree relatives          REVIEW OF SYSTEMS    CONSTITUTIONAL: No weakness, fevers or chills  EYES/ENT: No visual changes;  No vertigo or throat pain   NECK: No pain or stiffness  RESPIRATORY: No cough, wheezing, hemoptysis; No shortness of breath  CARDIOVASCULAR: No chest pain or palpitations  GASTROINTESTINAL: No abdominal or epigastric pain. No nausea, vomiting, or hematemesis; No diarrhea or constipation. No melena or hematochezia.  GENITOURINARY: No dysuria, frequency or hematuria  NEUROLOGICAL: No numbness or weakness  SKIN: No itching, rashes      Allergic/Immunologic: NKA      CIGARETTES: none    ALCOHOL: none    Vital Signs Last 24 Hrs  T(C): 37.1 (2020 13:51), Max: 37.1 (2020 13:51)  T(F): 98.7 (2020 13:51), Max: 98.7 (2020 13:51)  HR: 118 (2020 13:51) (118 - 120)  BP: 141/73 (2020 13:51) (141/73 - 146/101)  BP(mean): --  RR: 18 (2020 13:51) (18 - 18)  SpO2: 96% (2020 13:51) (96% - 99%)    PHYSICAL EXAM:    General: NAD  Neurology: A&Ox3, nonfocal  Respiratory: CTABL  CV: RRR, S1S2, no murmurs  GI: abdomen soft, NT, ND, +BS  Extremities: No edema, + peripheral pulses  Skin: warm and dry    I&O's Detail      LABS:                        14.8   6.10  )-----------( 207      ( 2020 11:11 )             43.7     07-20    136  |  102  |  13  ----------------------------<  105<H>  3.8   |  24  |  0.90    Ca    9.4      2020 11:11  Phos  2.8     07-20  Mg     2.0     07-20    TPro  7.2  /  Alb  4.4  /  TBili  0.4  /  DBili  x   /  AST  20  /  ALT  11  /  AlkPhos  63  07-20      Urinalysis Basic - ( 2020 12:05 )    Color: Light Yellow / Appearance: Clear / S.009 / pH: x  Gluc: x / Ketone: Negative  / Bili: Negative / Urobili: Negative   Blood: x / Protein: Trace / Nitrite: Negative   Leuk Esterase: Negative / RBC: 1 /hpf / WBC 0 /HPF   Sq Epi: x / Non Sq Epi: 0 /hpf / Bacteria: Negative      I&O's Summary    BNPSerum Pro-Brain Natriuretic Peptide: 386 pg/mL ( @ 11:11)    RADIOLOGY & ADDITIONAL STUDIES:    < from: Transthoracic Echocardiogram (18 @ 08:05) >  DIMENSIONS:  Dimensions:     Normal Values:  LA:     3.6 cm    2.0 - 4.0 cm  Ao:     2.7 cm    2.0 - 3.8 cm  SEPTUM: 0.7 cm    0.6 - 1.2 cm  PWT:    0.7 cm    0.6 - 1.1 cm  LVIDd:  4.7 cm    3.0 - 5.6 cm  LVIDs:  3.0 cm    1.8 - 4.0 cm  Derived Variables:  LVMI: 53 g/m2  RWT: 0.29  Fractional short: 36 %  Ejection Fraction (Teicholtz): 66 %  ------------------------------------------------------------------------  OBSERVATIONS:  Mitral Valve: Mitral annular calcification, otherwise  normal mitral valve. Mild mitral regurgitation.  Aortic Root: Normal aortic root.  Aortic Valve: Calcified trileaflet aortic valve with normal  opening.  Left Atrium: Moderately dilated left atrium.  LA volume  index = 42 cc/m2.  Left Ventricle: Normal left ventricular systolic function.  No segmental wall motion abnormalities. Normal left  ventricular internal dimensions and wall thicknesses.  Right Heart: Normal right atrium. Normal right ventricular  size and function. Normal tricuspid valve. Mild tricuspid  regurgitation. Normal pulmonic valve.  Pericardium/PleuraNormal pericardium with no pericardial  effusion.  Hemodynamic: Estimated right ventricular systolic pressure  equals 42 mm Hg, assuming right atrial pressure equals 10  mm Hg, consistent with mild pulmonary hypertension.  ------------------------------------------------------------------------  CONCLUSIONS:  1. Mitral annular calcification, otherwise normal mitral  valve. Mild mitral regurgitation.  2. Moderately dilated left atrium.  LA volume index = 42  cc/m2.  3. Normal left ventricular internal dimensions and wall  thicknesses.  4. Normal left ventricular systolic function. No segmental  wall motion abnormalities.  5. Normal right ventricular size and function.  6. Estimated right ventricular systolic pressure equals 42  mm Hg, assuming right atrial pressure equals 10 mm Hg,  consistent with mild pulmonary hypertension.  7. A bubble study was performed with the intravenous  injection of agitated saline contrast.  Following contrast  injection, no obvious bubbles were seen in the left heart.  ------------------------------------------------------------------------  Confirmed on  3/2/2018 - 09:52:31 by Angel Spears M.D.  ------------------------------------------------------------------------      < end of copied text >    -Event monitoring in 2019 showing occasional SVPB's and brief flurries of slow SVT and no atrial fibrillation) Patient is a 70y old  Female who presents with a chief complaint of     HPI:    Pt is a 70 year old woman with PMHx mild sick sinus syndrome (tendency for bradycardia), amyloidosis, Meniere's presenting after an episode of lightheadedness/dizziness. Says this felt different than her normal dizziness from vertigo/Meniere's. She awoke around 530am and noticed she felt dizzy and lightheaded. Fell back asleep after using the bathroom, then woke back up with a similar feeling. However, she also felt tachycardic after waking up the second time. Also felt like she was going to pass out after sitting upwards. She knows she is usually bradycardic, so decided to come to the ED. She denies chest pain, palpitations, SOB, CHAMORRO, LE swelling, diaphoresis.     Of note, says she had extensive workup for amyloidosis, proven on lung bx in 10/2019; AL-lambda by mass spec. SPEP, UPEP showed no monoclonal protein. Fat pad bx was negative for congo red staining. It was determined she only had pulmonary, with no systemic involvement of her amyloid.    PAST MEDICAL & SURGICAL HISTORY:  Bradycardia  Tinnitus  Vertigo  GERD (gastroesophageal reflux disease)  No significant past surgical history    MEDICATIONS  (STANDING):  nortriptyline 40 milliGRAM(s) Oral at bedtime  spironolactone 25 milliGRAM(s) Oral daily  topiramate 100 milliGRAM(s) Oral daily    FAMILY HISTORY:  No pertinent family history in first degree relatives    REVIEW OF SYSTEMS    CONSTITUTIONAL: No weakness, fevers or chills  EYES/ENT: No visual changes;  No vertigo or throat pain   NECK: No pain or stiffness  RESPIRATORY: No cough, wheezing, hemoptysis; No shortness of breath  CARDIOVASCULAR: No chest pain or palpitations  GASTROINTESTINAL: No abdominal or epigastric pain. No nausea, vomiting, or hematemesis; No diarrhea or constipation. No melena or hematochezia.  GENITOURINARY: No dysuria, frequency or hematuria  NEUROLOGICAL: No numbness or weakness  SKIN: No itching, rashes    Allergic/Immunologic: NKA    CIGARETTES: none    ALCOHOL: none    Vital Signs Last 24 Hrs  T(C): 37.1 (2020 13:51), Max: 37.1 (2020 13:51)  T(F): 98.7 (2020 13:51), Max: 98.7 (2020 13:51)  HR: 118 (2020 13:51) (118 - 120)  BP: 141/73 (2020 13:51) (141/73 - 146/101)  RR: 18 (2020 13:51) (18 - 18)  SpO2: 96% (2020 13:51) (96% - 99%)    PHYSICAL EXAM:  General: NAD  Neurology: A&Ox3, nonfocal  Respiratory: CTABL  CV: RRR, S1S2, no murmurs  GI: abdomen soft, NT, ND, +BS  Extremities: No edema, + peripheral pulses  Skin: warm and dry    I&O's Detail    LABS:                        14.8   6.10  )-----------( 207      ( 2020 11:11 )             43.7     07-20    136  |  102  |  13  ----------------------------<  105<H>  3.8   |  24  |  0.90    Ca    9.4      2020 11:11  Phos  2.8     07-20  Mg     2.0     07-20    TPro  7.2  /  Alb  4.4  /  TBili  0.4  /  DBili  x   /  AST  20  /  ALT  11  /  AlkPhos  63  07-20    Urinalysis Basic - ( 2020 12:05 )    Color: Light Yellow / Appearance: Clear / S.009 / pH: x  Gluc: x / Ketone: Negative  / Bili: Negative / Urobili: Negative   Blood: x / Protein: Trace / Nitrite: Negative   Leuk Esterase: Negative / RBC: 1 /hpf / WBC 0 /HPF   Sq Epi: x / Non Sq Epi: 0 /hpf / Bacteria: Negative    I&O's Summary    BNPSerum Pro-Brain Natriuretic Peptide: 386 pg/mL ( @ 11:11)    RADIOLOGY & ADDITIONAL STUDIES:    < from: Transthoracic Echocardiogram (18 @ 08:05) >  DIMENSIONS:  Dimensions:     Normal Values:  LA:     3.6 cm    2.0 - 4.0 cm  Ao:     2.7 cm    2.0 - 3.8 cm  SEPTUM: 0.7 cm    0.6 - 1.2 cm  PWT:    0.7 cm    0.6 - 1.1 cm  LVIDd:  4.7 cm    3.0 - 5.6 cm  LVIDs:  3.0 cm    1.8 - 4.0 cm  Derived Variables:  LVMI: 53 g/m2  RWT: 0.29  Fractional short: 36 %  Ejection Fraction (Teicholtz): 66 %  ------------------------------------------------------------------------  OBSERVATIONS:  Mitral Valve: Mitral annular calcification, otherwise  normal mitral valve. Mild mitral regurgitation.  Aortic Root: Normal aortic root.  Aortic Valve: Calcified trileaflet aortic valve with normal  opening.  Left Atrium: Moderately dilated left atrium.  LA volume  index = 42 cc/m2.  Left Ventricle: Normal left ventricular systolic function.  No segmental wall motion abnormalities. Normal left  ventricular internal dimensions and wall thicknesses.  Right Heart: Normal right atrium. Normal right ventricular  size and function. Normal tricuspid valve. Mild tricuspid  regurgitation. Normal pulmonic valve.  Pericardium/PleuraNormal pericardium with no pericardial  effusion.  Hemodynamic: Estimated right ventricular systolic pressure  equals 42 mm Hg, assuming right atrial pressure equals 10  mm Hg, consistent with mild pulmonary hypertension.  ------------------------------------------------------------------------  CONCLUSIONS:  1. Mitral annular calcification, otherwise normal mitral  valve. Mild mitral regurgitation.  2. Moderately dilated left atrium.  LA volume index = 42  cc/m2.  3. Normal left ventricular internal dimensions and wall  thicknesses.  4. Normal left ventricular systolic function. No segmental  wall motion abnormalities.  5. Normal right ventricular size and function.  6. Estimated right ventricular systolic pressure equals 42  mm Hg, assuming right atrial pressure equals 10 mm Hg,  consistent with mild pulmonary hypertension.  7. A bubble study was performed with the intravenous  injection of agitated saline contrast.  Following contrast  injection, no obvious bubbles were seen in the left heart.  ------------------------------------------------------------------------  Confirmed on  3/2/2018 - 09:52:31 by Angel Spears M.D.  ------------------------------------------------------------------------    < end of copied text >    -Event monitoring in 2019 showing occasional SVPB's and brief flurries of slow SVT and no atrial fibrillation)

## 2020-07-20 NOTE — H&P ADULT - NSHPPHYSICALEXAM_GEN_ALL_CORE
Vital Signs Last 24 Hrs  T(C): 37.1 (20 Jul 2020 13:51), Max: 37.1 (20 Jul 2020 13:51)  T(F): 98.7 (20 Jul 2020 13:51), Max: 98.7 (20 Jul 2020 13:51)  HR: 118 (20 Jul 2020 13:51) (118 - 120)  BP: 141/73 (20 Jul 2020 13:51) (141/73 - 146/101)  BP(mean): --  RR: 18 (20 Jul 2020 13:51) (18 - 18)  SpO2: 96% (20 Jul 2020 13:51) (96% - 99%)    GENERAL: NAD, well-developed  HEAD:  Atraumatic, Normocephalic  EYES: EOMI, PERRLA, conjunctiva and sclera clear  ENT: Pharynx not erythematous  PULMONARY: Clear to auscultation bilaterally; No wheeze  CARDIOVASCULAR: Regular rate and rhythm; No murmurs, rubs, or gallops  ABDOMEN: Soft, Nontender, Nondistended; Bowel sounds present  EXTREMITIES:  2+ Peripheral Pulses, No clubbing, cyanosis, or edema  MUSCULOSKELETAL: No calf tenderness  PSYCH: AAOx3, normal affect  NEUROLOGY: CN2-12 grossly intact, no gross focal sensory or motor deficits   SKIN: warm and dry, No rashes or lesions

## 2020-07-20 NOTE — CONSULT NOTE ADULT - ASSESSMENT
Pt is a 70 year old woman with PMHx mild sick sinus syndrome (tendency for bradycardia), amyloidosis, Meniere's p/w dizziness and tachycardia.   Cardiology consulted for tachycardia.    1. Tachycardia 110s-120s  -EKG showing sinus tachycardia with PAC  -Would repeat TTE  -Check TSH and free thyroxine  -Orthostatics positive with diastolic drop of 19 mmHg. S/p 1L IVFs. Would repeat orthostatics.  -Telemetry monitoring for any underlying arrhythmia      Demetris Singleton PGY3 Pt is a 70 year old woman with PMHx mild sick sinus syndrome (tendency for bradycardia), amyloidosis, Meniere's p/w dizziness and tachycardia.   Cardiology consulted for tachycardia.    1. Tachycardia 110s-120s  -Hx of sinus node dysfunction. EKG showing sinus tachycardia with PAC  -Would repeat TTE  -Check TSH and free thyroxine  -S/p 1L IVFs in ED with little improvement, pt appears euvolemic  -Telemetry monitoring for any underlying arrhythmia      Demetris Singleton PGY3 Pt is a 70 year old woman with PMHx mild sick sinus syndrome (tendency for bradycardia), amyloidosis, Meniere's p/w dizziness and tachycardia.   Cardiology consulted for tachycardia.    1. Tachycardia 110s-120s  -Hx of sinus node dysfunction. EKG showing sinus tachycardia with PAC  -Would repeat TTE  -Check TSH and free thyroxine  -F/u d-dimer  -S/p 1L IVFs in ED with little improvement, pt appears euvolemic  -Telemetry monitoring for any underlying arrhythmia      Demetris Singleton PGY3 Pt is a 70 year old woman with PMHx mild sick sinus syndrome (tendency for bradycardia), amyloidosis, Meniere's p/w dizziness and tachycardia.   Cardiology consulted for tachycardia.    1. Tachycardia 110s-120s  -Hx of sinus node dysfunction. EKG showing sinus tachycardia with PAC  -Given pt has prolonged Qtc and side effect of tachycardia, suggest holding Nortriptyline   -Would repeat TTE  -Check TSH and free thyroxine  -F/u d-dimer  -S/p 1L IVFs in ED with little improvement, pt appears euvolemic  -Telemetry monitoring for any underlying arrhythmia      Demetris Singleton PGY3 Pt is a 70 year old woman with PMHx mild sick sinus syndrome (tendency for bradycardia), amyloidosis, Meniere's p/w dizziness and tachycardia.   Cardiology consulted for tachycardia.    1. Tachycardia 110s-120s  -Hx of sinus node dysfunction. EKG showing sinus tachycardia with PAC  -Given pt has prolonged Qtc, suggest monitoring EKG for Nortriptyline use  -Would repeat TTE  -Check TSH and free thyroxine  -F/u d-dimer  -S/p 1L IVFs in ED with little improvement, pt appears euvolemic  -Telemetry monitoring for any underlying arrhythmia      Demetris Singleton PGY3 Pt is a 70 year old woman with PMHx mild sick sinus syndrome (tendency for bradycardia), amyloidosis, Meniere's p/w dizziness and tachycardia.   Cardiology consulted for tachycardia.    1. Tachycardia 110s-120s  -Hx of sinus node dysfunction. EKG showing sinus tachycardia with PAC vs. atrial tachycardia  -Given pt has prolonged Qtc, suggest monitoring EKG for Nortriptyline use  -Would repeat TTE  -Check TSH and free thyroxine  -F/u d-dimer  -S/p 1L IVFs in ED with little improvement, pt appears euvolemic  -Telemetry monitoring for any underlying arrhythmia      Demetris Singleton PGY3 Pt is a 70 year old woman with PMHx mild sick sinus syndrome (tendency for bradycardia), amyloidosis, Meniere's p/w dizziness and tachycardia.   Cardiology consulted for tachycardia.    1. Tachycardia 110s-120s  -Hx of sinus node dysfunction. EKG showing sinus tachycardia with PAC vs. atrial tachycardia  -Would repeat TTE  -Check TSH and free thyroxine  -F/u d-dimer  -S/p 1L IVFs in ED with little improvement, pt appears euvolemic  -Telemetry monitoring for any underlying arrhythmia      Demetris Singleton PGY3 Pt is a 70 year old woman with PMHx mild sick sinus syndrome (tendency for bradycardia), amyloidosis, Meniere's p/w dizziness and tachycardia.   Cardiology consulted for tachycardia.    1. Tachycardia 110s-120s  -Hx of sinus node dysfunction w/ baseline HR 50s-60s in cardiology clinic. Event monitor revealed sinus bradycardia with slow nonsustained episodes of SVT. EKG here showing sinus tachycardia with PAC vs. atrial tachycardia.   -Would repeat TTE  -Check TSH and free thyroxine  -F/u d-dimer  -S/p 1L IVFs in ED with little improvement, pt appears euvolemic  -Telemetry monitoring for any underlying arrhythmia      Demetris Singleton PGY3    Discussed with attending, Dr. Pemberton. Pt is a 70 year old woman with PMHx mild sick sinus syndrome (tendency for bradycardia), amyloidosis, Meniere's p/w dizziness and tachycardia.   Cardiology consulted for tachycardia.    1. Tachycardia 110s-120s  -Hx of sinus node dysfunction w/ baseline HR 50s-60s in cardiology clinic. Event monitor revealed sinus bradycardia with slow nonsustained episodes of SVT. EKG here showing sinus tachycardia with PAC vs. atrial tachycardia.   -Would repeat TTE  -Check TSH and free thyroxine  -F/u d-dimer  -S/p 1L IVFs in ED with little improvement, pt appears euvolemic  -Telemetry monitoring for any underlying arrhythmia    Demetris Singleton PGY3    Discussed with attending, Dr. Pemberton.

## 2020-07-20 NOTE — ED ADULT TRIAGE NOTE - BSA (M2)
Daily Note     Today's date: 2018  Patient name: Obi Feliciano  : 1989  MRN: [id number]  Referring provider: Ania Santos MD  Dx: No diagnosis found  Subjective: Pt reports feeling pretty today, with no pain, prior to start of session  Pt ambulates with antalgic gait of R LE  Objective: See treatment diary below  Pt reports compliance with HEP  States that he had an MRI of is L knee done and his doctor reported it to be a "bad bone bruise  Doctor told him he is cleared to receive therapy to this knee  Weight/resistance increased as tolerated by pt this session  Assessment: Tolerated treatment well  Patient exhibited good technique with therapeutic exercises and would benefit from continued PT  Pt R LE limited in ROM likely secondary to tightness of R hamstring  Minimal to no verbal cues required for proper form during exercises  Handled increase in resistance with no increase of pain  Plan: Continue per plan of care  Progress treatment as tolerated  Precautions: L4-L5 fx and pelvic fx    Daily Treatment Diary     Manual  11/9 11/12 11/14 11/19 11/21 11/26 11/28 12/3 12/5 12/7 12/12   Hip ext/int rot stretching CB CB CB CB MM JM SC CB AF CB AB   Hamstring stretch CB CB CB CB MM JM SC CB AF CB AB   Hip flexor stretch CB CB CB CB MM JM SC CB AF CB AB   L hamstring STM        CB AF CB AB                   Exercise Diary               Bike  15' 10' 15' 15' 15' 15' 15' 15' 15' 15' 20'   Supine march  3x10 GTB 3x10 GTB 3x10 GTB w/mini kick out 3X10 GTB 3x10 GTB 3x10  GTB 3x10 GTB DC      Abduction heel slides 2X10  3x10 3x10 3x10 3x10 np np 3x10 DC     Standing abd/ext 2x10 ea 3x10 ea NP 2x10 ea 2x10 ea 2x10 ea 2x10 ea    2x10 ea 3X10 3x10 2#  2x10   Calf stretch 4x20" 4x20" 4x20" np np np np 4x20" 4X20" 4x20" np   Staggered stance weight shift  2x10 +  2x10 w/balance disk 3x10 2x10 2x10 2x10 2x10 2x12 DC      TB press   1x10 ea BTB 3x10 BTB 4x10 BTB 4x10 BTB 4x10  BTB 4x10 BTB 2x10 PTB 2X10  PTB 4x10 PTB  4x10  PTB   SL clamshellw/pb   3x10 3X10 3x10 2x15 2x15 2x10 2X10 3x10 3x10   SL reverse clam/w pb   3x10 3X10 3x10 2x15 2x15 2x15 2X15 3x10 3# 3x10  3#   Step up/down      4"  x15 4" 20x 4" 20x 4"  20X L3 2x10 L3  2x10   Left hamstring iso on table       HEP np      Ball toss        3' on foam 3'  foam np    Balance board        2' 2' 2' 2'   PB rollouts        3x10 L/forward 3X10  L/forward np    LP          90# 2x10 90#  2x10   SL LP          35# 2x10 50#  2x10                                 Modalities       11/26 11/28       MHP    10'   10' 10' 1.87

## 2020-07-20 NOTE — H&P ADULT - NSICDXPASTMEDICALHX_GEN_ALL_CORE_FT
PAST MEDICAL HISTORY:  Amyloidosis     GERD (gastroesophageal reflux disease)     Meniere disease     Tinnitus     Vertigo

## 2020-07-20 NOTE — CONSULT NOTE ADULT - ATTENDING COMMENTS
70 year old woman with past medical history of pulmonary amyloidosis, Meniere's presenting after an episode of lightheadedness/dizziness found with atrial arrythmia. Start metoprolol 12.5 mg BID. Pending ECHO.     Nilay Pemberton MD, MPH, JAYME, RPVI, [facility]  Cardiovascular Specialist   [facility]  c: [phone] (text preferred and/or call)  e: [email or web address]

## 2020-07-20 NOTE — PATIENT PROFILE ADULT - NSPROEXTENSIONSOFSELF_GEN_A_NUR
none Consent: Written consent obtained, risks reviewed including but not limited to crusting, scabbing, blistering, scarring, darker or lighter pigmentary change, incidental hair removal, bruising, and/or incomplete removal.

## 2020-07-20 NOTE — ED ADULT NURSE NOTE - OBJECTIVE STATEMENT
70YOF pmh vertigo, nodular pulmonary embolismosis presents to ED c/o tachycardia and near syncope episode this morning. Denies fever, chills, CP, SOB, abd pain, N/V/D, burning 70YOF pmh vertigo, nodular pulmonary embolismosis presents to ED c/o tachycardia and near syncope episode this morning. Pt states she woke up in the morning and "felt unwell," then went back to sleep and urinated in her sleep. Denies seizure like activity. Pt also felt her heart beat racing. Pt placed on cardiac monitor, EKG done. Denies fever, chills, CP, SOB, abd pain, N/V/D, burning upon urination. Safety and comfort measures provided. Will continue to monitor.

## 2020-07-21 ENCOUNTER — TRANSCRIPTION ENCOUNTER (OUTPATIENT)
Age: 70
End: 2020-07-21

## 2020-07-21 PROBLEM — H81.09 MENIERE'S DISEASE, UNSPECIFIED EAR: Chronic | Status: ACTIVE | Noted: 2020-07-20

## 2020-07-21 PROBLEM — E85.9 AMYLOIDOSIS, UNSPECIFIED: Chronic | Status: ACTIVE | Noted: 2020-07-20

## 2020-07-21 LAB
ANION GAP SERPL CALC-SCNC: 9 MMOL/L — SIGNIFICANT CHANGE UP (ref 5–17)
BASOPHILS # BLD AUTO: 0.05 K/UL — SIGNIFICANT CHANGE UP (ref 0–0.2)
BASOPHILS NFR BLD AUTO: 0.8 % — SIGNIFICANT CHANGE UP (ref 0–2)
BUN SERPL-MCNC: 12 MG/DL — SIGNIFICANT CHANGE UP (ref 7–23)
CALCIUM SERPL-MCNC: 8.8 MG/DL — SIGNIFICANT CHANGE UP (ref 8.4–10.5)
CHLORIDE SERPL-SCNC: 106 MMOL/L — SIGNIFICANT CHANGE UP (ref 96–108)
CO2 SERPL-SCNC: 23 MMOL/L — SIGNIFICANT CHANGE UP (ref 22–31)
CREAT SERPL-MCNC: 0.87 MG/DL — SIGNIFICANT CHANGE UP (ref 0.5–1.3)
CULTURE RESULTS: SIGNIFICANT CHANGE UP
D DIMER BLD IA.RAPID-MCNC: 192 NG/ML DDU — SIGNIFICANT CHANGE UP
D DIMER BLD IA.RAPID-MCNC: 193 NG/ML DDU — SIGNIFICANT CHANGE UP
EOSINOPHIL # BLD AUTO: 0.16 K/UL — SIGNIFICANT CHANGE UP (ref 0–0.5)
EOSINOPHIL NFR BLD AUTO: 2.6 % — SIGNIFICANT CHANGE UP (ref 0–6)
GLUCOSE SERPL-MCNC: 106 MG/DL — HIGH (ref 70–99)
HCT VFR BLD CALC: 39.3 % — SIGNIFICANT CHANGE UP (ref 34.5–45)
HCV AB S/CO SERPL IA: 0.15 S/CO — SIGNIFICANT CHANGE UP (ref 0–0.99)
HCV AB SERPL-IMP: SIGNIFICANT CHANGE UP
HGB BLD-MCNC: 13.1 G/DL — SIGNIFICANT CHANGE UP (ref 11.5–15.5)
IMM GRANULOCYTES NFR BLD AUTO: 0.3 % — SIGNIFICANT CHANGE UP (ref 0–1.5)
LYMPHOCYTES # BLD AUTO: 1.97 K/UL — SIGNIFICANT CHANGE UP (ref 1–3.3)
LYMPHOCYTES # BLD AUTO: 32.2 % — SIGNIFICANT CHANGE UP (ref 13–44)
MAGNESIUM SERPL-MCNC: 1.9 MG/DL — SIGNIFICANT CHANGE UP (ref 1.6–2.6)
MCHC RBC-ENTMCNC: 29.9 PG — SIGNIFICANT CHANGE UP (ref 27–34)
MCHC RBC-ENTMCNC: 33.3 GM/DL — SIGNIFICANT CHANGE UP (ref 32–36)
MCV RBC AUTO: 89.7 FL — SIGNIFICANT CHANGE UP (ref 80–100)
MONOCYTES # BLD AUTO: 0.67 K/UL — SIGNIFICANT CHANGE UP (ref 0–0.9)
MONOCYTES NFR BLD AUTO: 10.9 % — SIGNIFICANT CHANGE UP (ref 2–14)
NEUTROPHILS # BLD AUTO: 3.25 K/UL — SIGNIFICANT CHANGE UP (ref 1.8–7.4)
NEUTROPHILS NFR BLD AUTO: 53.2 % — SIGNIFICANT CHANGE UP (ref 43–77)
NRBC # BLD: 0 /100 WBCS — SIGNIFICANT CHANGE UP (ref 0–0)
PLATELET # BLD AUTO: 184 K/UL — SIGNIFICANT CHANGE UP (ref 150–400)
POTASSIUM SERPL-MCNC: 3.8 MMOL/L — SIGNIFICANT CHANGE UP (ref 3.5–5.3)
POTASSIUM SERPL-SCNC: 3.8 MMOL/L — SIGNIFICANT CHANGE UP (ref 3.5–5.3)
RBC # BLD: 4.38 M/UL — SIGNIFICANT CHANGE UP (ref 3.8–5.2)
RBC # FLD: 12.3 % — SIGNIFICANT CHANGE UP (ref 10.3–14.5)
SODIUM SERPL-SCNC: 138 MMOL/L — SIGNIFICANT CHANGE UP (ref 135–145)
SPECIMEN SOURCE: SIGNIFICANT CHANGE UP
T3FREE SERPL-MCNC: 2.54 PG/ML — SIGNIFICANT CHANGE UP (ref 1.8–4.6)
T4 FREE SERPL-MCNC: 1 NG/DL — SIGNIFICANT CHANGE UP (ref 0.9–1.8)
TSH SERPL-MCNC: 3.92 UIU/ML — SIGNIFICANT CHANGE UP (ref 0.27–4.2)
WBC # BLD: 6.12 K/UL — SIGNIFICANT CHANGE UP (ref 3.8–10.5)
WBC # FLD AUTO: 6.12 K/UL — SIGNIFICANT CHANGE UP (ref 3.8–10.5)

## 2020-07-21 PROCEDURE — 93010 ELECTROCARDIOGRAM REPORT: CPT

## 2020-07-21 PROCEDURE — 99232 SBSQ HOSP IP/OBS MODERATE 35: CPT

## 2020-07-21 PROCEDURE — 93306 TTE W/DOPPLER COMPLETE: CPT | Mod: 26

## 2020-07-21 RX ORDER — METOPROLOL TARTRATE 50 MG
12.5 TABLET ORAL
Refills: 0 | Status: DISCONTINUED | OUTPATIENT
Start: 2020-07-21 | End: 2020-07-22

## 2020-07-21 RX ORDER — APIXABAN 2.5 MG/1
5 TABLET, FILM COATED ORAL EVERY 12 HOURS
Refills: 0 | Status: DISCONTINUED | OUTPATIENT
Start: 2020-07-21 | End: 2020-07-22

## 2020-07-21 RX ORDER — ENOXAPARIN SODIUM 100 MG/ML
40 INJECTION SUBCUTANEOUS DAILY
Refills: 0 | Status: DISCONTINUED | OUTPATIENT
Start: 2020-07-21 | End: 2020-07-21

## 2020-07-21 RX ADMIN — SPIRONOLACTONE 25 MILLIGRAM(S): 25 TABLET, FILM COATED ORAL at 05:39

## 2020-07-21 RX ADMIN — Medication 12.5 MILLIGRAM(S): at 11:35

## 2020-07-21 RX ADMIN — APIXABAN 5 MILLIGRAM(S): 2.5 TABLET, FILM COATED ORAL at 21:57

## 2020-07-21 RX ADMIN — Medication 100 MILLIGRAM(S): at 21:56

## 2020-07-21 RX ADMIN — Medication 12.5 MILLIGRAM(S): at 21:56

## 2020-07-21 RX ADMIN — ENOXAPARIN SODIUM 40 MILLIGRAM(S): 100 INJECTION SUBCUTANEOUS at 11:35

## 2020-07-21 RX ADMIN — NORTRIPTYLINE HYDROCHLORIDE 40 MILLIGRAM(S): 10 CAPSULE ORAL at 21:56

## 2020-07-21 NOTE — PROGRESS NOTE ADULT - ATTENDING COMMENTS
70 year old woman with past medical history of pulmonary amyloidosis, Meniere's presenting after an episode of lightheadedness/dizziness found with atrial arrythmia and newly discovered atrial fibrillation rate controlled. Start metoprolol 12.5 mg BID. ECHO with preserved EF. Elevated CHARDS2-Vasc therefore will need anticoagulation with apixaban 5 mg BID. Acceptable for discharge today. Signing off.     Nilay Pemberton MD, MPH, JAYME, RPVI, FACC  Cardiovascular Specialist   [facility]  c: [phone] (text preferred and/or call)  e: [email or web address] 70 year old woman with past medical history of pulmonary amyloidosis, Meniere's presenting after an episode of lightheadedness/dizziness found with atrial arrythmia and newly discovered atrial fibrillation rate controlled. Start metoprolol 12.5 mg BID. ECHO with preserved EF. Elevated CHADS2-Vasc therefore will need anticoagulation with apixaban 5 mg BID. Acceptable for discharge today. Signing off.     Nilay Pemberton MD, MPH, JAYME, RPVI, FACC  Cardiovascular Specialist   [facility]  c: [phone] (text preferred and/or call)  e: [email or web address]

## 2020-07-21 NOTE — DISCHARGE NOTE PROVIDER - PROVIDER TOKENS
PROVIDER:[TOKEN:[[id number]]],PROVIDER:[TOKEN:[[id number]]] PROVIDER:[TOKEN:[[id number]]],PROVIDER:[TOKEN:[[id number]]],PROVIDER:[TOKEN:[[id number]],FOLLOWUP:[1 week]]

## 2020-07-21 NOTE — DISCHARGE NOTE PROVIDER - CARE PROVIDER_API CALL
Samantha Munson  INTERNAL MEDICINE  [address] [place] [address]  Phone: [phone]  Fax: ()-  Follow Up Time:     Nilay Pemberton (MPH; JAYME)  Internal Medicine  [address] [place] [address]  Phone: [phone]  Fax: [phone]  Follow Up Time: Samantha Munson  INTERNAL MEDICINE  [address] [place] [address]  Phone: [phone]  Fax: ()-  Follow Up Time:     Nilay Pemberton (MPH; JAYME)  Internal Medicine  [address] [place] [address]  Phone: [phone]  Fax: [phone]  Follow Up Time:     Brayan Roach  INTERNAL MEDICINE  [address] [address]  Phone: [phone]  Fax: [phone]  Follow Up Time: 1 week

## 2020-07-21 NOTE — PROGRESS NOTE ADULT - SUBJECTIVE AND OBJECTIVE BOX
Patient is a 70y old  Female who presents with a chief complaint of dizziness (20 Jul 2020 15:14)      Subjective:  Patient seen and examined at bedside. No complaints this morning. Asymptomatic overnight.  Appears to have atrial tachycardia on telemetry throughout yesterday, reverted to sinus rhythm around 7:30pm last night.    Review Of Systems: No chest pain, shortness of breath, lightheadedness, dizziness or palpitations            Medications:  lactated ringers. 1000 milliLiter(s) IV Continuous <Continuous>  nortriptyline 40 milliGRAM(s) Oral at bedtime  spironolactone 25 milliGRAM(s) Oral daily  topiramate 100 milliGRAM(s) Oral daily      PAST MEDICAL & SURGICAL HISTORY:  Amyloidosis  Meniere disease  Tinnitus  Vertigo  GERD (gastroesophageal reflux disease)  No significant past surgical history      Objective:  Vitals:  T(F): 98.3 (07-21), Max: 98.7 (07-20)  HR: 61 (07-21) (61 - 122)  BP: 116/73 (07-21) (116/73 - 146/101)  RR: 18 (07-21)  SpO2: 98% (07-21)  I&O's Summary    20 Jul 2020 07:01  -  21 Jul 2020 07:00  --------------------------------------------------------  IN: 1100 mL / OUT: 700 mL / NET: 400 mL    21 Jul 2020 07:01  -  21 Jul 2020 09:57  --------------------------------------------------------  IN: 240 mL / OUT: 0 mL / NET: 240 mL        Physical Exam:  Appearance: No acute distress; well appearing  Eyes: PERRL, EOMI, pink conjunctiva  HENT: Normal oral muscosa  Cardiovascular: RRR, S1, S2, no murmurs, rubs, or gallops; no edema; no JVD  Respiratory: Clear to auscultation bilaterally  Gastrointestinal: soft, non-tender, non-distended with normal bowel sounds  Musculoskeletal: No clubbing; no joint deformity   Neurologic: Non-focal  Lymphatic: No lymphadenopathy  Psychiatry: AAOx3, mood & affect appropriate  Skin: No rashes, ecchymoses, or cyanosis                          13.1   6.12  )-----------( 184      ( 21 Jul 2020 05:59 )             39.3     07-21    138  |  106  |  12  ----------------------------<  106<H>  3.8   |  23  |  0.87    Ca    8.8      21 Jul 2020 05:59  Phos  2.8     07-20  Mg     1.9     07-21    TPro  7.2  /  Alb  4.4  /  TBili  0.4  /  DBili  x   /  AST  20  /  ALT  11  /  AlkPhos  63  07-20          Serum Pro-Brain Natriuretic Peptide: 386 pg/mL (07-20 @ 11:11)          New ECG(s): Personally reviewed. Sinus bradycardia with PAC HR 60. Patient is a 70y old  Female who presents with a chief complaint of dizziness (20 Jul 2020 15:14)      Subjective:  Patient seen and examined at bedside. No complaints this morning. Asymptomatic overnight.  Appears to have atrial tachycardia on telemetry throughout yesterday, reverted to sinus rhythm around 7:30pm last night.    Review Of Systems: No chest pain, shortness of breath, lightheadedness, dizziness or palpitations            Medications:  lactated ringers. 1000 milliLiter(s) IV Continuous <Continuous>  nortriptyline 40 milliGRAM(s) Oral at bedtime  spironolactone 25 milliGRAM(s) Oral daily  topiramate 100 milliGRAM(s) Oral daily      PAST MEDICAL & SURGICAL HISTORY:  Amyloidosis  Meniere disease  Tinnitus  Vertigo  GERD (gastroesophageal reflux disease)  No significant past surgical history      Objective:  Vitals:  T(F): 98.3 (07-21), Max: 98.7 (07-20)  HR: 61 (07-21) (61 - 122)  BP: 116/73 (07-21) (116/73 - 146/101)  RR: 18 (07-21)  SpO2: 98% (07-21)  I&O's Summary    20 Jul 2020 07:01  -  21 Jul 2020 07:00  --------------------------------------------------------  IN: 1100 mL / OUT: 700 mL / NET: 400 mL    21 Jul 2020 07:01  -  21 Jul 2020 09:57  --------------------------------------------------------  IN: 240 mL / OUT: 0 mL / NET: 240 mL        Physical Exam:  Appearance: No acute distress; well appearing  Eyes: PERRL, EOMI, pink conjunctiva  HENT: Normal oral muscosa  Cardiovascular: RRR, S1, S2, no murmurs, rubs, or gallops; no edema; no JVD  Respiratory: Clear to auscultation bilaterally  Gastrointestinal: soft, non-tender, non-distended with normal bowel sounds  Musculoskeletal: No clubbing; no joint deformity   Neurologic: Non-focal  Lymphatic: No lymphadenopathy  Psychiatry: AAOx3, mood & affect appropriate  Skin: No rashes, ecchymoses, or cyanosis                          13.1   6.12  )-----------( 184      ( 21 Jul 2020 05:59 )             39.3     07-21    138  |  106  |  12  ----------------------------<  106<H>  3.8   |  23  |  0.87    Ca    8.8      21 Jul 2020 05:59  Phos  2.8     07-20  Mg     1.9     07-21    TPro  7.2  /  Alb  4.4  /  TBili  0.4  /  DBili  x   /  AST  20  /  ALT  11  /  AlkPhos  63  07-20          Serum Pro-Brain Natriuretic Peptide: 386 pg/mL (07-20 @ 11:11)          New ECG(s): Personally reviewed. Sinus rhythm with PAC HR 60. Patient is a 70y old  Female who presents with a chief complaint of dizziness (20 Jul 2020 15:14)      Subjective:  Patient seen and examined at bedside. No complaints this morning. Asymptomatic overnight.  Appears to have atrial tachycardia vs. afib on telemetry throughout yesterday, reverted to sinus rhythm around 7:30pm last night.    Review Of Systems: No chest pain, shortness of breath, lightheadedness, dizziness or palpitations            Medications:  lactated ringers. 1000 milliLiter(s) IV Continuous <Continuous>  nortriptyline 40 milliGRAM(s) Oral at bedtime  spironolactone 25 milliGRAM(s) Oral daily  topiramate 100 milliGRAM(s) Oral daily      PAST MEDICAL & SURGICAL HISTORY:  Amyloidosis  Meniere disease  Tinnitus  Vertigo  GERD (gastroesophageal reflux disease)  No significant past surgical history      Objective:  Vitals:  T(F): 98.3 (07-21), Max: 98.7 (07-20)  HR: 61 (07-21) (61 - 122)  BP: 116/73 (07-21) (116/73 - 146/101)  RR: 18 (07-21)  SpO2: 98% (07-21)  I&O's Summary    20 Jul 2020 07:01  -  21 Jul 2020 07:00  --------------------------------------------------------  IN: 1100 mL / OUT: 700 mL / NET: 400 mL    21 Jul 2020 07:01  -  21 Jul 2020 09:57  --------------------------------------------------------  IN: 240 mL / OUT: 0 mL / NET: 240 mL        Physical Exam:  Appearance: No acute distress; well appearing  Eyes: PERRL, EOMI, pink conjunctiva  HENT: Normal oral muscosa  Cardiovascular: RRR, S1, S2, no murmurs, rubs, or gallops; no edema; no JVD  Respiratory: Clear to auscultation bilaterally  Gastrointestinal: soft, non-tender, non-distended with normal bowel sounds  Musculoskeletal: No clubbing; no joint deformity   Neurologic: Non-focal  Lymphatic: No lymphadenopathy  Psychiatry: AAOx3, mood & affect appropriate  Skin: No rashes, ecchymoses, or cyanosis                          13.1   6.12  )-----------( 184      ( 21 Jul 2020 05:59 )             39.3     07-21    138  |  106  |  12  ----------------------------<  106<H>  3.8   |  23  |  0.87    Ca    8.8      21 Jul 2020 05:59  Phos  2.8     07-20  Mg     1.9     07-21    TPro  7.2  /  Alb  4.4  /  TBili  0.4  /  DBili  x   /  AST  20  /  ALT  11  /  AlkPhos  63  07-20          Serum Pro-Brain Natriuretic Peptide: 386 pg/mL (07-20 @ 11:11)          New ECG(s): Personally reviewed. Sinus rhythm with PAC HR 60. Patient is a 70y old  Female who presents with a chief complaint of dizziness (20 Jul 2020 15:14)      Subjective:  Patient seen and examined at bedside. No complaints this morning. Asymptomatic overnight.  Appears to have atrial tachycardia vs. afib on telemetry throughout yesterday, reverted to sinus rhythm around 7:30pm last night.    Review Of Systems: No chest pain, shortness of breath, lightheadedness, dizziness or palpitations            Medications:  lactated ringers. 1000 milliLiter(s) IV Continuous <Continuous>  nortriptyline 40 milliGRAM(s) Oral at bedtime  spironolactone 25 milliGRAM(s) Oral daily  topiramate 100 milliGRAM(s) Oral daily      PAST MEDICAL & SURGICAL HISTORY:  Amyloidosis  Meniere disease  Tinnitus  Vertigo  GERD (gastroesophageal reflux disease)  No significant past surgical history      Objective:  Vitals:  T(F): 98.3 (07-21), Max: 98.7 (07-20)  HR: 61 (07-21) (61 - 122)  BP: 116/73 (07-21) (116/73 - 146/101)  RR: 18 (07-21)  SpO2: 98% (07-21)  I&O's Summary    20 Jul 2020 07:01  -  21 Jul 2020 07:00  --------------------------------------------------------  IN: 1100 mL / OUT: 700 mL / NET: 400 mL    21 Jul 2020 07:01  -  21 Jul 2020 09:57  --------------------------------------------------------  IN: 240 mL / OUT: 0 mL / NET: 240 mL        Physical Exam:  Appearance: No acute distress; well appearing  Eyes: PERRL, EOMI, pink conjunctiva  HENT: Normal oral muscosa  Cardiovascular: RRR, S1, S2, no murmurs, rubs, or gallops; no edema; no JVD  Respiratory: Clear to auscultation bilaterally  Gastrointestinal: soft, non-tender, non-distended with normal bowel sounds  Musculoskeletal: No clubbing; no joint deformity   Neurologic: Non-focal  Lymphatic: No lymphadenopathy  Psychiatry: AAOx3, mood & affect appropriate  Skin: No rashes, ecchymoses, or cyanosis                          13.1   6.12  )-----------( 184      ( 21 Jul 2020 05:59 )             39.3     07-21    138  |  106  |  12  ----------------------------<  106<H>  3.8   |  23  |  0.87    Ca    8.8      21 Jul 2020 05:59  Phos  2.8     07-20  Mg     1.9     07-21    TPro  7.2  /  Alb  4.4  /  TBili  0.4  /  DBili  x   /  AST  20  /  ALT  11  /  AlkPhos  63  07-20          Serum Pro-Brain Natriuretic Peptide: 386 pg/mL (07-20 @ 11:11)  New ECG(s): Personally reviewed. Sinus rhythm with PAC HR 60. Patient is a 70y old  Female who presents with a chief complaint of dizziness (20 Jul 2020 15:14)    Subjective:  Patient seen and examined at bedside. No complaints this morning. Asymptomatic overnight.  Appears to have atrial tachycardia vs. afib on telemetry throughout yesterday, reverted to sinus rhythm around 7:30pm last night.    Review Of Systems: No chest pain, shortness of breath, lightheadedness, dizziness or palpitations            Medications:  lactated ringers. 1000 milliLiter(s) IV Continuous <Continuous>  nortriptyline 40 milliGRAM(s) Oral at bedtime  spironolactone 25 milliGRAM(s) Oral daily  topiramate 100 milliGRAM(s) Oral daily    PAST MEDICAL & SURGICAL HISTORY:  Amyloidosis  Meniere disease  Tinnitus  Vertigo  GERD (gastroesophageal reflux disease)  No significant past surgical history    Objective:  Vitals:  T(F): 98.3 (07-21), Max: 98.7 (07-20)  HR: 61 (07-21) (61 - 122)  BP: 116/73 (07-21) (116/73 - 146/101)  RR: 18 (07-21)  SpO2: 98% (07-21)  I&O's Summary    20 Jul 2020 07:01  -  21 Jul 2020 07:00  --------------------------------------------------------  IN: 1100 mL / OUT: 700 mL / NET: 400 mL    21 Jul 2020 07:01  -  21 Jul 2020 09:57  --------------------------------------------------------  IN: 240 mL / OUT: 0 mL / NET: 240 mL    Physical Exam:  Appearance: No acute distress; well appearing  Eyes: PERRL, EOMI, pink conjunctiva  HENT: Normal oral muscosa  Cardiovascular: RRR, S1, S2, no murmurs, rubs, or gallops; no edema; no JVD  Respiratory: Clear to auscultation bilaterally  Gastrointestinal: soft, non-tender, non-distended with normal bowel sounds  Musculoskeletal: No clubbing; no joint deformity   Neurologic: Non-focal  Lymphatic: No lymphadenopathy  Psychiatry: AAOx3, mood & affect appropriate  Skin: No rashes, ecchymoses, or cyanosis                        13.1   6.12  )-----------( 184      ( 21 Jul 2020 05:59 )             39.3     07-21    138  |  106  |  12  ----------------------------<  106<H>  3.8   |  23  |  0.87    Ca    8.8      21 Jul 2020 05:59  Phos  2.8     07-20  Mg     1.9     07-21    TPro  7.2  /  Alb  4.4  /  TBili  0.4  /  DBili  x   /  AST  20  /  ALT  11  /  AlkPhos  63  07-20    Serum Pro-Brain Natriuretic Peptide: 386 pg/mL (07-20 @ 11:11)  New ECG(s): Personally reviewed. Sinus rhythm with PAC HR 60.

## 2020-07-21 NOTE — DISCHARGE NOTE PROVIDER - NSDCFUADDAPPT_GEN_ALL_CORE_FT
please follow up with your PMD (Dr. Roach or Dr. Munson) in 1 week.   follow up with cardiology in 1 ~ 2 weeks.

## 2020-07-21 NOTE — DISCHARGE NOTE PROVIDER - NSDCMRMEDTOKEN_GEN_ALL_CORE_FT
betahistine 48mg Capsule: 1 cap(s) orally 2 times a day    NOTE: MADE AT A COMPOUNDING PHARMACY   Citracal + D:   CoQ10:   nortriptyline 10 mg oral capsule: 4 cap(s) orally once a day (at bedtime)  spironolactone 25 mg oral tablet: 1 tab(s) orally once a day  topiramate 100 mg oral tablet: 1 tab(s) orally once a day  Vitamin B12:   Vitamin B2: apixaban 5 mg oral tablet: 1 tab(s) orally every 12 hours  betahistine 48mg Capsule: 1 cap(s) orally 2 times a day    NOTE: MADE AT A COMPOUNDING PHARMACY   Citracal + D:   CoQ10:   nortriptyline 10 mg oral capsule: 4 cap(s) orally once a day (at bedtime)  spironolactone 25 mg oral tablet: 1 tab(s) orally once a day  topiramate 100 mg oral tablet: 1 tab(s) orally once a day  Vitamin B12:   Vitamin B2:

## 2020-07-21 NOTE — PROGRESS NOTE ADULT - ATTENDING COMMENTS
if echo normal plan to dc kurt Campos will be covering  starting 7/22/20  please call [facility] @ [phone] for questions or concerns

## 2020-07-21 NOTE — DISCHARGE NOTE PROVIDER - NSDCCPCAREPLAN_GEN_ALL_CORE_FT
PRINCIPAL DISCHARGE DIAGNOSIS  Diagnosis: Tachycardia  Assessment and Plan of Treatment: EKG confirms sinus tachycardia, and paroximal atrial fibrillation (AFIB).   Started Lopressor 12.5mg twice a day to manage tachycardia; however you had slow heart rhythm, stopped lopressor for now. please follow up with Dr. Pemberton (cardiology) to discuss when you can restart taking it.   continue with Eliquis 5mg twice a day.  Echocardiogram normal study.   Atrial fibrillation is the most common heart rhythm problem.  The condition puts you at risk for has stroke and heart attack  It helps if you control your blood pressure, not drink more than 1-2 alcohol drinks per day, cut down on caffeine, getting treatment for over active thyroid gland, and get regular exercise  Call your doctor if you feel your heart racing or beating unusually, chest tightness or pain, lightheaded, faint, shortness of breath especially with exercise  It is important to take your heart medication as prescribed  You may be on anticoagulation which is very important to take as directed - you may need blood work to monitor drug levels        SECONDARY DISCHARGE DIAGNOSES  Diagnosis: Menieres disease  Assessment and Plan of Treatment: No vertigo, stable.  continue with home spironolactone, Topamax and nortriptyline.       Diagnosis: Amyloidosis  Assessment and Plan of Treatment: pulmonary amyloid.   follow up with [facility]    Diagnosis: Near syncope  Assessment and Plan of Treatment: Likely from dehydration and tachycardia.  plans are same as above.

## 2020-07-21 NOTE — DISCHARGE NOTE PROVIDER - HOSPITAL COURSE
70 year old female with pmhx Meniere's, pulmonary amyloidosis presents to ED c/o dizziness and lightheaded this morning. Patient states she woke up around 530 this am after having a strange dream and realized she had urinated while sleeping. At that time pt states she felt lightheaded but fell back asleep after using bathroom. When patient woke up again reports persistent lightheadedness and palpitations. Denies syncope, fevers, chills, sick contacts, chest pain, sob, abd pain, n/v/d, tremors, biting of tongue, post-ictal state. No recent travel or sick contacts. She lives with her  and son but they go outside minimally. 70 year old female with pmhx Meniere's, pulmonary amyloidosis presents to ED c/o dizziness and lightheaded this morning admitted for sinus tachycardia. EKG w/ consistent with paroxysmal atrial fibrillation. Seen by cardiology, s/p IV hydration. started lopressor 12.5mg BID. had episodes of bradycardia. hold off lopressor and outpt f/u with cardiolgy.  d-dimer. thyroid panel WNL, echo normal study. v/s stable. hx of Meniere's disease, stable. continue with spironolactone, Topamax and nortriptyline.     pt is hemodynamically stable to d/c home. 70 year old female with pmhx Meniere's, pulmonary amyloidosis presents to ED c/o dizziness and lightheaded this morning admitted for sinus tachycardia. EKG w/ consistent with paroxysmal atrial fibrillation. Seen by cardiology, s/p IV hydration. started lopressor 12.5mg BID. had episodes of bradycardia. hold off lopressor and outpt f/u with cardiolgy.  d-dimer. thyroid panel WNL, echo normal study. v/s stable. hx of Meniere's disease, stable. continue with spironolactone, Topamax and nortriptyline.     pt is hemodynamically stable to d/c home.         patient seen and examined. agree with the above.     discussed with patient    medication reconciliation reviewed    30 minutes was spent coordinating care on day of discharge

## 2020-07-21 NOTE — DISCHARGE NOTE PROVIDER - NSDCFUSCHEDAPPT_GEN_ALL_CORE_FT
DEREK MULLIGAN ; 07/23/2020 ; [facility] DEREK MULLIGAN ; 07/23/2020 ; [facility] DEREK MULLIGAN ; 07/23/2020 ; [facility] Card[facility] DEREK MULLIGAN ; 07/23/2020 ; [facility] Card[facility]

## 2020-07-21 NOTE — PROGRESS NOTE ADULT - SUBJECTIVE AND OBJECTIVE BOX
Patient is a 70y old  Female who presents with a chief complaint of dizziness (2020 18:22)      INTERVAL HPI/OVERNIGHT EVENTS: noted  pt seen and examined  feels well, no tele vents  no cp/sob/dizziness/palpitations    Vital Signs Last 24 Hrs  T(C): 36.9 (2020 12:44), Max: 37 (2020 20:01)  T(F): 98.4 (2020 12:44), Max: 98.6 (2020 20:01)  HR: 57 (2020 12:44) (57 - 74)  BP: 126/75 (2020 12:44) (116/73 - 128/67)  BP(mean): 87 (2020 05:00) (87 - 87)  RR: 17 (2020 12:44) (17 - 18)  SpO2: 97% (2020 12:44) (96% - 98%)    apixaban 5 milliGRAM(s) Oral every 12 hours  lactated ringers. 1000 milliLiter(s) IV Continuous <Continuous>  metoprolol tartrate 12.5 milliGRAM(s) Oral two times a day  nortriptyline 40 milliGRAM(s) Oral at bedtime  spironolactone 25 milliGRAM(s) Oral daily  topiramate 100 milliGRAM(s) Oral daily      PHYSICAL EXAM:  GENERAL: NAD,   EYES: conjunctiva and sclera clear  ENMT: Moist mucous membranes  NECK: Supple, No JVD, Normal thyroid  CHEST/LUNG: non labored, cta b/l  HEART: Regular rate and rhythm; No murmurs, rubs, or gallops  ABDOMEN: Soft, Nontender, Nondistended; Bowel sounds present  EXTREMITIES:  2+ Peripheral Pulses, No clubbing, cyanosis, or edema  LYMPH: No lymphadenopathy noted  SKIN: No rashes or lesions    Consultant(s) Notes Reviewed:  [x ] YES  [ ] NO  Care Discussed with Consultants/Other Providers [ x] YES  [ ] NO    LABS:                        13.1   6.12  )-----------( 184      ( 2020 05:59 )             39.3     07-21    138  |  106  |  12  ----------------------------<  106<H>  3.8   |  23  |  0.87    Ca    8.8      2020 05:59  Phos  2.8     07-  Mg     1.9     -    TPro  7.2  /  Alb  4.4  /  TBili  0.4  /  DBili  x   /  AST  20  /  ALT  11  /  AlkPhos  63  07-20      Urinalysis Basic - ( 2020 12:05 )    Color: Light Yellow / Appearance: Clear / S.009 / pH: x  Gluc: x / Ketone: Negative  / Bili: Negative / Urobili: Negative   Blood: x / Protein: Trace / Nitrite: Negative   Leuk Esterase: Negative / RBC: 1 /hpf / WBC 0 /HPF   Sq Epi: x / Non Sq Epi: 0 /hpf / Bacteria: Negative      CAPILLARY BLOOD GLUCOSE            Urinalysis Basic - ( 2020 12:05 )    Color: Light Yellow / Appearance: Clear / S.009 / pH: x  Gluc: x / Ketone: Negative  / Bili: Negative / Urobili: Negative   Blood: x / Protein: Trace / Nitrite: Negative   Leuk Esterase: Negative / RBC: 1 /hpf / WBC 0 /HPF   Sq Epi: x / Non Sq Epi: 0 /hpf / Bacteria: Negative        Culture - Urine (collected 2020 15:21)  Source: .Urine Clean Catch (Midstream)  Final Report (2020 15:43):    <10,000 CFU/mL Normal Urogenital Hiwot        RADIOLOGY & ADDITIONAL TESTS:    Imaging Personally Reviewed:  [x ] YES  [ ] NO

## 2020-07-21 NOTE — DISCHARGE NOTE PROVIDER - CARE PROVIDERS DIRECT ADDRESSES
,DirectAddress_Unknown,[email or web address] ,DirectAddress_Unknown,[email or web address],[email or web address]

## 2020-07-21 NOTE — PROGRESS NOTE ADULT - ASSESSMENT
Pt is a 70 year old woman with PMHx mild sick sinus syndrome (tendency for bradycardia), amyloidosis, Meniere's p/w dizziness and tachycardia.   Cardiology consulted for tachycardia.    1. Atrial tachycardia  -Telemetry and EKG consistent with episode of atrial tachycardia that reverted to sinus rhythm around 730pm last night. Pt asymptomatic throughout episode.   -Start metoprolol tartrate 12.5mg BID for suppression, monitor for bradycardia  -TTE ordered  -TSH/free thyroxine normal  -D-dimer negative    If pt stable on metoprolol and TTE with no significant structural/functional dysfunction, stable for discharge from cardiac standpoint. Will sign off at this time.    Please call back with any questions/concerns.    Demetris Singleton PGY3    Discussed with attending, Dr. Pemberton and fellow, Dr. Olivo. Pt is a 70 year old woman with PMHx mild sick sinus syndrome (tendency for bradycardia), amyloidosis, Meniere's p/w dizziness and tachycardia.   Cardiology consulted for tachycardia.    1. Atrial tachycardia  -Telemetry and EKG consistent with episode of atrial tachycardia that reverted to sinus rhythm around 730pm last night. Pt asymptomatic throughout episode.   -Start metoprolol tartrate 12.5mg BID for suppression, monitor for symptomatic bradycardia. Okay if pt's HR is 50s-60s while on metoprolol (baseline outpatient).  -TTE ordered  -TSH/free thyroxine normal  -D-dimer negative    If pt stable on metoprolol and TTE with no significant structural/functional dysfunction, stable for discharge from cardiac standpoint. Will sign off at this time.  Please call back with any questions/concerns.    Demetris Singleton PGY3    Discussed with attending, Dr. Pemberton and fellow, Dr. Olivo.    All Cardiology service information can be found 24/7 on [email or web address], password: [email or web address] Pt is a 70 year old woman with PMHx mild sick sinus syndrome (tendency for bradycardia), amyloidosis, Meniere's p/w dizziness and tachycardia.   Cardiology consulted for tachycardia.    1. Atrial tachycardia  -Telemetry and EKG consistent with atrial tachycardia throughout yesterday that reverted to sinus rhythm around 730pm last night. Pt asymptomatic throughout episode.   -Start metoprolol tartrate 12.5mg BID for suppression, monitor for symptomatic bradycardia. Okay if pt's HR is 50s-60s while on metoprolol (baseline outpatient).  -TTE ordered  -TSH/free thyroxine normal  -D-dimer negative    If pt stable on metoprolol and TTE with no significant structural/functional dysfunction, stable for discharge from cardiac standpoint. Will sign off at this time.  Please call back with any questions/concerns.    Demetris Singleton PGY3    Discussed with attending, Dr. Pemberton and fellow, Dr. Olivo.    All Cardiology service information can be found 24/7 on [email or web address], password: [email or web address] Pt is a 70 year old woman with PMHx mild sick sinus syndrome (tendency for bradycardia), amyloidosis, Meniere's p/w dizziness and tachycardia.   Cardiology consulted for tachycardia.    1. Tachycardia  -Telemetry and EKG consistent with atrial tachycardia vs. afib (at times) throughout yesterday that reverted to sinus rhythm around 730pm last night. Pt asymptomatic throughout episode.   -Start metoprolol tartrate 12.5mg BID for suppression, monitor for symptomatic bradycardia. Okay if pt's HR is 50s-60s while on metoprolol (baseline outpatient).  -TTE ordered  -TSH/free thyroxine normal  -D-dimer negative  -Continue telemetry monitoring    If pt stable on metoprolol and TTE with no significant structural/functional dysfunction, stable for discharge from cardiac standpoint.     Demetris Singleton PGY3    Discussed with attending, Dr. Pemberton and fellow, Dr. Olivo.    All Cardiology service information can be found 24/7 on [email or web address], password: [email or web address] Pt is a 70 year old woman with PMHx mild sick sinus syndrome (tendency for bradycardia), amyloidosis, Meniere's p/w dizziness and tachycardia.   Cardiology consulted for tachycardia.    1. Tachycardia  -Telemetry and EKG consistent with atrial tachycardia afib (at times) throughout yesterday that reverted to sinus rhythm around 730pm last night. Pt asymptomatic throughout episode.   -Start metoprolol tartrate 12.5mg BID for suppression, monitor for symptomatic bradycardia. Okay if pt's HR is 50s-60s while on metoprolol (baseline outpatient).  -Start Eliquis 5 mg bid for ne atrial fibrillation.  -TTE ordered  -TSH/free thyroxine normal  -D-dimer negative  -Continue telemetry monitoring    Demetris Singleton PGY3    Discussed with attending, Dr. Pemberton and fellow, Dr. Olivo.    All Cardiology service information can be found 24/7 on [email or web address], password: [email or web address] Pt is a 70 year old woman with PMHx mild sick sinus syndrome (tendency for bradycardia), amyloidosis, Meniere's p/w dizziness and tachycardia.   Cardiology consulted for tachycardia.    1. Tachycardia  -Telemetry and EKG consistent with paroxysmal atrial fibrillation throughout yesterday that reverted to sinus rhythm around 730pm last night. Pt asymptomatic throughout episode.   -Start metoprolol tartrate 12.5mg BID for suppression, monitor for symptomatic bradycardia. Okay if pt's HR is 50s-60s while on metoprolol (baseline outpatient).  -Start Eliquis 5 mg BID for ne atrial fibrillation.  -TTE ordered  -TSH/free thyroxine normal  -D-dimer negative  -Continue telemetry monitoring    Demetris Singleton PGY3    Discussed with attending, Dr. Pemberton and fellow, Dr. Olivo.    All Cardiology service information can be found 24/7 on [email or web address], password: [email or web address] Pt is a 70 year old woman with PMHx mild sick sinus syndrome (tendency for bradycardia), amyloidosis, Meniere's p/w dizziness and tachycardia.   Cardiology consulted for tachycardia.    1. Tachycardia  -Telemetry and EKG consistent with paroxysmal atrial fibrillation throughout yesterday that reverted to sinus rhythm around 730pm last night. Pt asymptomatic throughout episode.   -Start metoprolol tartrate 12.5mg BID for suppression, monitor for symptomatic bradycardia. Okay if pt's HR is 50s-60s while on metoprolol (baseline outpatient).  -Start Eliquis 5 mg BID for ne atrial fibrillation.  -TTE ordered  -TSH/free thyroxine normal  -D-dimer negative  -Continue telemetry monitoring.     Demetris Singleton PGY3    Discussed with attending, Dr. Pemberton and fellow, Dr. Olivo.    All Cardiology service information can be found 24/7 on [email or web address], password: [email or web address] Olivo MD  Cardiology Fellow   [phone]    Please check [email or web address] password: "[email or web address]" for cardiology service schedule and contact information. Pt is a 70 year old woman with PMHx mild sick sinus syndrome (tendency for bradycardia), amyloidosis, Meniere's p/w dizziness and tachycardia.   Cardiology consulted for tachycardia.    1. Tachycardia  -Telemetry and EKG consistent with paroxysmal atrial fibrillation throughout yesterday that reverted to sinus rhythm around 730pm last night. Pt asymptomatic throughout episode.   -Start metoprolol tartrate 12.5mg BID for suppression, monitor for symptomatic bradycardia. Okay if pt's HR is 50s-60s while on metoprolol (baseline outpatient).  -Start Eliquis 5 mg BID for ne atrial fibrillation.  -TTE ordered  -TSH/free thyroxine normal  -D-dimer negative  -Continue telemetry monitoring.     Please see our previous note for recommendations.  We will sign off at this time. Please contact General Cardiology Consult fellow if any questions arise or guidance needed. The number can be found on [email or web address] under "cardfellows"     Thank you for allowing us to participate in this patient's care.     Jamie Olivo   Cardiology Fellow Pt is a 70 year old woman with PMHx sick sinus syndrome (tendency for bradycardia), amyloidosis, Meniere's p/w dizziness and tachycardia.   Cardiology consulted for tachycardia.    1. Tachycardia  -Telemetry and EKG consistent with paroxysmal atrial fibrillation throughout yesterday that reverted to sinus rhythm around 730pm last night. Pt asymptomatic throughout episode.   -Start metoprolol tartrate 12.5mg BID for suppression, monitor for symptomatic bradycardia. Okay if pt's HR is 50s-60s while on metoprolol (baseline outpatient).  -Start Eliquis 5 mg BID for ne atrial fibrillation.  -TTE ordered  -TSH/free thyroxine normal  -D-dimer negative  -Continue telemetry monitoring.     Please see our previous note for recommendations.  We will sign off at this time. Please contact General Cardiology Consult fellow if any questions arise or guidance needed. The number can be found on [email or web address] under "cardfellows"     Thank you for allowing us to participate in this patient's care.     Jamie Olivo   Cardiology Fellow Pt is a 70 year old woman with PMHx sick sinus syndrome (tendency for bradycardia), amyloidosis, Meniere's p/w dizziness and tachycardia.   Cardiology consulted for tachycardia.    1. Tachycardia  -Telemetry and EKG consistent with paroxysmal atrial fibrillation throughout yesterday that reverted to sinus rhythm around 730pm last night. Pt asymptomatic throughout episode.   -Start metoprolol tartrate 12.5mg BID for suppression, monitor for symptomatic bradycardia. Okay if pt's HR is 50s-60s while on metoprolol (baseline outpatient).  -Start Eliquis 5 mg BID for atrial fibrillation.  -TTE ordered  -TSH/free thyroxine normal  -D-dimer negative  -Continue telemetry monitoring.     Please see our previous note for recommendations.  We will sign off at this time. Please contact General Cardiology Consult fellow if any questions arise or guidance needed. The number can be found on [email or web address] under "cardfellows"     Thank you for allowing us to participate in this patient's care.     Jamie Olivo   Cardiology Fellow

## 2020-07-21 NOTE — PROGRESS NOTE ADULT - PROBLEM SELECTOR PLAN 1
- ekg confirms sinus tachycardia  - orthostatics negative  - echocardiogram fu  - tsh ordered for the am  - will continue to hydrate with LR at 75 cc/hr  - c/w telemetry  - monitor mg, K daily  - cardiology consulted-fu echo

## 2020-07-22 ENCOUNTER — TRANSCRIPTION ENCOUNTER (OUTPATIENT)
Age: 70
End: 2020-07-22

## 2020-07-22 VITALS
SYSTOLIC BLOOD PRESSURE: 106 MMHG | DIASTOLIC BLOOD PRESSURE: 64 MMHG | HEART RATE: 66 BPM | OXYGEN SATURATION: 97 % | RESPIRATION RATE: 17 BRPM

## 2020-07-22 PROCEDURE — 93005 ELECTROCARDIOGRAM TRACING: CPT

## 2020-07-22 PROCEDURE — 84443 ASSAY THYROID STIM HORMONE: CPT

## 2020-07-22 PROCEDURE — 84439 ASSAY OF FREE THYROXINE: CPT

## 2020-07-22 PROCEDURE — 71045 X-RAY EXAM CHEST 1 VIEW: CPT

## 2020-07-22 PROCEDURE — 86803 HEPATITIS C AB TEST: CPT

## 2020-07-22 PROCEDURE — 93306 TTE W/DOPPLER COMPLETE: CPT

## 2020-07-22 PROCEDURE — 87635 SARS-COV-2 COVID-19 AMP PRB: CPT

## 2020-07-22 PROCEDURE — 84484 ASSAY OF TROPONIN QUANT: CPT

## 2020-07-22 PROCEDURE — 80048 BASIC METABOLIC PNL TOTAL CA: CPT

## 2020-07-22 PROCEDURE — 84481 FREE ASSAY (FT-3): CPT

## 2020-07-22 PROCEDURE — 99285 EMERGENCY DEPT VISIT HI MDM: CPT | Mod: 25

## 2020-07-22 PROCEDURE — 84100 ASSAY OF PHOSPHORUS: CPT

## 2020-07-22 PROCEDURE — 81001 URINALYSIS AUTO W/SCOPE: CPT

## 2020-07-22 PROCEDURE — 85379 FIBRIN DEGRADATION QUANT: CPT

## 2020-07-22 PROCEDURE — 80053 COMPREHEN METABOLIC PANEL: CPT

## 2020-07-22 PROCEDURE — 85027 COMPLETE CBC AUTOMATED: CPT

## 2020-07-22 PROCEDURE — 87086 URINE CULTURE/COLONY COUNT: CPT

## 2020-07-22 PROCEDURE — 83880 ASSAY OF NATRIURETIC PEPTIDE: CPT

## 2020-07-22 PROCEDURE — 83735 ASSAY OF MAGNESIUM: CPT

## 2020-07-22 RX ORDER — APIXABAN 2.5 MG/1
1 TABLET, FILM COATED ORAL
Qty: 60 | Refills: 0
Start: 2020-07-22 | End: 2020-08-20

## 2020-07-22 RX ORDER — METOPROLOL TARTRATE 50 MG
0.5 TABLET ORAL
Qty: 30 | Refills: 0
Start: 2020-07-22 | End: 2020-08-20

## 2020-07-22 RX ADMIN — SPIRONOLACTONE 25 MILLIGRAM(S): 25 TABLET, FILM COATED ORAL at 06:16

## 2020-07-22 RX ADMIN — APIXABAN 5 MILLIGRAM(S): 2.5 TABLET, FILM COATED ORAL at 09:01

## 2020-07-22 RX ADMIN — Medication 12.5 MILLIGRAM(S): at 09:00

## 2020-07-23 ENCOUNTER — NON-APPOINTMENT (OUTPATIENT)
Age: 70
End: 2020-07-23

## 2020-07-23 ENCOUNTER — APPOINTMENT (OUTPATIENT)
Dept: CARDIOLOGY | Facility: CLINIC | Age: 70
End: 2020-07-23
Payer: MEDICARE

## 2020-07-23 VITALS
SYSTOLIC BLOOD PRESSURE: 126 MMHG | OXYGEN SATURATION: 100 % | HEART RATE: 69 BPM | TEMPERATURE: 97 F | BODY MASS INDEX: 24.55 KG/M2 | DIASTOLIC BLOOD PRESSURE: 78 MMHG | WEIGHT: 162 LBS | HEIGHT: 68 IN

## 2020-07-23 PROCEDURE — 93000 ELECTROCARDIOGRAM COMPLETE: CPT

## 2020-07-23 PROCEDURE — 99215 OFFICE O/P EST HI 40 MIN: CPT

## 2020-07-23 NOTE — REVIEW OF SYSTEMS
[Seeing Double (Diplopia)] : diplopia [Palpitations] : palpitations [Dizziness] : dizziness [Negative] : Heme/Lymph [Shortness Of Breath] : no shortness of breath [Dyspnea on exertion] : not dyspnea during exertion [Chest Pain] : no chest pain [Leg Claudication] : no intermittent leg claudication [Lower Ext Edema] : no extremity edema

## 2020-07-23 NOTE — REASON FOR VISIT
[Atrial Fibrillation] : atrial fibrillation [Sick Sinus Syndrome] : sick sinus syndrome [Supraventricular Tachycardia] : supraventricular tachycardia [Follow-Up - From Hospitalization] : follow-up of a recent hospitalization for [Discharge Date: ___] : Discharge Date: [unfilled]

## 2020-07-23 NOTE — PHYSICAL EXAM
[General Appearance - Well Developed] : well developed [Normal Appearance] : normal appearance [Well Groomed] : well groomed [General Appearance - Well Nourished] : well nourished [No Deformities] : no deformities [General Appearance - In No Acute Distress] : no acute distress [Normal Conjunctiva] : the conjunctiva exhibited no abnormalities [Eyelids - No Xanthelasma] : the eyelids demonstrated no xanthelasmas [Normal Oral Mucosa] : normal oral mucosa [No Oral Pallor] : no oral pallor [No Oral Cyanosis] : no oral cyanosis [Normal Jugular Venous A Waves Present] : normal jugular venous A waves present [Normal Jugular Venous V Waves Present] : normal jugular venous V waves present [No Jugular Venous Turner A Waves] : no jugular venous turner A waves [Exaggerated Use Of Accessory Muscles For Inspiration] : no accessory muscle use [Respiration, Rhythm And Depth] : normal respiratory rhythm and effort [Auscultation Breath Sounds / Voice Sounds] : lungs were clear to auscultation bilaterally [Normal] : normal [5th Left ICS - MCL] : palpated at the 5th LICS in the midclavicular line [Normal Rate] : normal [Premature Beats] : regular with premature beats [Normal S2] : normal S2 [Normal S1] : normal S1 [No Gallop] : no gallop heard [No Murmur] : no murmurs heard [2+] : right 2+ [No Abnormalities] : the abdominal aorta was not enlarged and no bruit was heard [No Pitting Edema] : no pitting edema present [Abdomen Tenderness] : non-tender [Abdomen Soft] : soft [Abdomen Mass (___ Cm)] : no abdominal mass palpated [Abnormal Walk] : normal gait [Gait - Sufficient For Exercise Testing] : the gait was sufficient for exercise testing [Nail Clubbing] : no clubbing of the fingernails [Cyanosis, Localized] : no localized cyanosis [Petechial Hemorrhages (___cm)] : no petechial hemorrhages [] : no rash [Skin Color & Pigmentation] : normal skin color and pigmentation [Skin Lesions] : no skin lesions [No Venous Stasis] : no venous stasis [No Skin Ulcers] : no skin ulcer [Affect] : the affect was normal [No Xanthoma] : no  xanthoma was observed [Oriented To Time, Place, And Person] : oriented to person, place, and time [No Anxiety] : not feeling anxious [Mood] : the mood was normal [Right Carotid Bruit] : no bruit heard over the right carotid [Click] : no click [Left Carotid Bruit] : no bruit heard over the left carotid

## 2020-07-23 NOTE — HISTORY OF PRESENT ILLNESS
[FreeTextEntry1] : Mrs. Chika Draper is a 69-year-old woman evaluated in the past for mild sick sinus syndrome with tendency to bradycardia and felt to have no significant cardiac disease. After a busy day at work onset of sense that her vision was "swimming" and she tolerated for several hours, but when it persisted the following morning went to the emergency room. She had a complete neurological evaluation and MRI/MRA and was concluded not to have any central nervous system abnormality. She was observed for bradycardia in the CDU and had an echocardiogram that was entirely normal. She was discharged and advised to followup with cardiologist.\par \par Remained well for a time, then sudden episode of profound weakness. Could barely stand up, unable to get to car. Called ambulance and went to [facility] ER. Blood pressure elevated in range of 200/100, but declined without specific treatment. CT head, MRI, complete blood tests all normal and then cardiac echo normal as well including normal bubble study. Had.renal sonogram and urine for metanephrines and saw Dr. Roach in office. Has had further neurology evaluation and presumed diagnosis of Meniere's and had monthly severe vertiginous spells. Neurologist proposed starting acetazolamide, then increased . 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  She is no longer on Diamox and is now on spironolactone, but episodes of vertigo and poor balance persist.  There have been no palpitations and no syncope.\par \par Few days ago woke early in morning dizzy, EMS found hypotensive in 60s, in ER blood pressure satisfactory, but tachycardia interpreted as sinus tach, however probably atrial tachycardia at 118 interestingly with long LA interval (possibly a long RP tachycardia) and broke to sinus few hours later. Then on further monitoring had runs of atrial fibrillation and started on anticoagulation with apixaban. Was aware of SVT, but not of runs of atrial fibrillation and has had no palpitations since hospital discharge.

## 2020-07-23 NOTE — DISCUSSION/SUMMARY
[Paroxysmal Atrial Fibrillation] : paroxysmal atrial fibrillation [Rhythm Control] : rhythm control [Anticoagulation] : anticoagulation [Medication Changes Per Orders] : as documented in orders [Patient] : the patient [de-identified] : apixaban as started in hospital [de-identified] : seek to reduce episodes with low dose beta blocker [de-identified] : prolonged Event Recorder only revealed mild sinus bradycardia, occasional brief flurries of few beats of SVT, but now have had sustained slow SVT 1t 118 bpm, suspect a long RP tachycardia. [de-identified] : only reassurance seems necessary and there is no treatment for bradycardia and occasional supraventricular premature beats observed [de-identified] : attempt to start long acting metoprolol at very low dose. In hospital received short acting form 12.5 mg and mild asymtpomatic bradycardia observed and it was stopped

## 2020-08-06 ENCOUNTER — NON-APPOINTMENT (OUTPATIENT)
Age: 70
End: 2020-08-06

## 2020-08-06 ENCOUNTER — APPOINTMENT (OUTPATIENT)
Dept: CARDIOLOGY | Facility: CLINIC | Age: 70
End: 2020-08-06
Payer: MEDICARE

## 2020-08-06 VITALS
HEART RATE: 59 BPM | BODY MASS INDEX: 25.09 KG/M2 | WEIGHT: 165 LBS | DIASTOLIC BLOOD PRESSURE: 80 MMHG | OXYGEN SATURATION: 98 % | SYSTOLIC BLOOD PRESSURE: 134 MMHG | TEMPERATURE: 97.6 F

## 2020-08-06 PROCEDURE — 99214 OFFICE O/P EST MOD 30 MIN: CPT

## 2020-08-06 PROCEDURE — 93000 ELECTROCARDIOGRAM COMPLETE: CPT

## 2020-08-06 NOTE — DISCUSSION/SUMMARY
[Paroxysmal Atrial Fibrillation] : paroxysmal atrial fibrillation [Rhythm Control] : rhythm control [Anticoagulation] : anticoagulation [None] : none [Patient] : the patient [de-identified] : seek to reduce episodes with low dose beta blocker [de-identified] : maintain apixaban as started in hospital and low dose beta blocker  [de-identified] : prolonged Event Recorder in past only revealed mild sinus bradycardia, occasional brief flurries of few beats of SVT, but now have had sustained slow SVT 1t 118 bpm, suspect a long RP tachycardia. [de-identified] : only reassurance seems necessary and there is no treatment for bradycardia and occasional supraventricular premature beats observed

## 2020-08-06 NOTE — PHYSICAL EXAM
[General Appearance - Well Developed] : well developed [Normal Appearance] : normal appearance [No Deformities] : no deformities [General Appearance - Well Nourished] : well nourished [Well Groomed] : well groomed [General Appearance - In No Acute Distress] : no acute distress [Normal Conjunctiva] : the conjunctiva exhibited no abnormalities [Normal Oral Mucosa] : normal oral mucosa [No Oral Pallor] : no oral pallor [Eyelids - No Xanthelasma] : the eyelids demonstrated no xanthelasmas [Normal Jugular Venous A Waves Present] : normal jugular venous A waves present [No Oral Cyanosis] : no oral cyanosis [Normal Jugular Venous V Waves Present] : normal jugular venous V waves present [No Jugular Venous Turner A Waves] : no jugular venous turner A waves [Auscultation Breath Sounds / Voice Sounds] : lungs were clear to auscultation bilaterally [Respiration, Rhythm And Depth] : normal respiratory rhythm and effort [Exaggerated Use Of Accessory Muscles For Inspiration] : no accessory muscle use [Normal] : normal [5th Left ICS - MCL] : palpated at the 5th LICS in the midclavicular line [Normal S1] : normal S1 [Normal Rate] : normal [Premature Beats] : regular with premature beats [No Gallop] : no gallop heard [Normal S2] : normal S2 [No Murmur] : no murmurs heard [No Pitting Edema] : no pitting edema present [2+] : left 2+ [No Abnormalities] : the abdominal aorta was not enlarged and no bruit was heard [Abdomen Soft] : soft [Abdomen Tenderness] : non-tender [Abdomen Mass (___ Cm)] : no abdominal mass palpated [Nail Clubbing] : no clubbing of the fingernails [Gait - Sufficient For Exercise Testing] : the gait was sufficient for exercise testing [Abnormal Walk] : normal gait [Cyanosis, Localized] : no localized cyanosis [Petechial Hemorrhages (___cm)] : no petechial hemorrhages [Skin Color & Pigmentation] : normal skin color and pigmentation [] : no rash [No Venous Stasis] : no venous stasis [Skin Lesions] : no skin lesions [No Skin Ulcers] : no skin ulcer [No Xanthoma] : no  xanthoma was observed [Oriented To Time, Place, And Person] : oriented to person, place, and time [Affect] : the affect was normal [No Anxiety] : not feeling anxious [Mood] : the mood was normal [Click] : no click [Left Carotid Bruit] : no bruit heard over the left carotid [Right Carotid Bruit] : no bruit heard over the right carotid

## 2020-08-06 NOTE — HISTORY OF PRESENT ILLNESS
[FreeTextEntry1] : Mrs. Chika Draper is a 69-year-old woman evaluated in the past for mild sick sinus syndrome with tendency to bradycardia and felt to have no significant cardiac disease. After a busy day at work onset of sense that her vision was "swimming" and she tolerated for several hours, but when it persisted the following morning went to the emergency room. She had a complete neurological evaluation and MRI/MRA and was concluded not to have any central nervous system abnormality. She was observed for bradycardia in the CDU and had an echocardiogram that was entirely normal. She was discharged and advised to followup with cardiologist.\par \par Remained well for a time, then sudden episode of profound weakness. Could barely stand up, unable to get to car. Called ambulance and went to [facility] ER. Blood pressure elevated in range of 200/100, but declined without specific treatment. CT head, MRI, complete blood tests all normal and then cardiac echo normal as well including normal bubble study. Had.renal sonogram and urine for metanephrines and saw Dr. Roach in office. Has had further neurology evaluation and presumed diagnosis of Meniere's and had monthly severe vertiginous spells. Neurologist proposed starting acetazolamide, then increased . 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  She is no longer on Diamox and is now on spironolactone, but episodes of vertigo and poor balance persist.  There have been no palpitations and no syncope.\par \par Two weeks ago woke early in morning dizzy, EMS found hypotensive in 60s, in ER blood pressure satisfactory, but tachycardia interpreted as sinus tach, however probably atrial tachycardia at 118 and on further review have concluded it was atypical atrial flutter with 2:1 block. After few hours broke to sinus and then on further monitoring had runs of atrial fibrillation and started on anticoagulation with apixaban. Was aware of SVT, but not of runs of atrial fibrillation and remains with no palpitations since hospital discharge.

## 2020-08-06 NOTE — REASON FOR VISIT
[Follow-Up - Clinic] : a clinic follow-up of [Atrial Fibrillation] : atrial fibrillation [Sick Sinus Syndrome] : sick sinus syndrome [Supraventricular Tachycardia] : supraventricular tachycardia [Discharge Date: ___] : Discharge Date: [unfilled]

## 2020-08-06 NOTE — REVIEW OF SYSTEMS
[Seeing Double (Diplopia)] : diplopia [Palpitations] : palpitations [Dizziness] : dizziness [Negative] : Heme/Lymph [Shortness Of Breath] : no shortness of breath [Dyspnea on exertion] : not dyspnea during exertion [Lower Ext Edema] : no extremity edema [Chest Pain] : no chest pain [Leg Claudication] : no intermittent leg claudication

## 2020-08-26 ENCOUNTER — NON-APPOINTMENT (OUTPATIENT)
Age: 70
End: 2020-08-26

## 2020-08-26 ENCOUNTER — INPATIENT (INPATIENT)
Facility: HOSPITAL | Age: 70
LOS: 1 days | Discharge: HOME | DRG: 274 | End: 2020-08-28
Attending: INTERNAL MEDICINE | Admitting: INTERNAL MEDICINE
Payer: MEDICARE

## 2020-08-26 ENCOUNTER — APPOINTMENT (OUTPATIENT)
Dept: CARDIOLOGY | Facility: CLINIC | Age: 70
End: 2020-08-26
Payer: MEDICARE

## 2020-08-26 VITALS
WEIGHT: 164 LBS | TEMPERATURE: 97.3 F | SYSTOLIC BLOOD PRESSURE: 118 MMHG | HEIGHT: 68 IN | OXYGEN SATURATION: 97 % | BODY MASS INDEX: 24.86 KG/M2 | DIASTOLIC BLOOD PRESSURE: 83 MMHG | HEART RATE: 129 BPM

## 2020-08-26 VITALS
OXYGEN SATURATION: 99 % | SYSTOLIC BLOOD PRESSURE: 103 MMHG | WEIGHT: 162.92 LBS | DIASTOLIC BLOOD PRESSURE: 65 MMHG | HEIGHT: 68 IN | RESPIRATION RATE: 20 BRPM | TEMPERATURE: 99 F | HEART RATE: 129 BPM

## 2020-08-26 DIAGNOSIS — E85.9 AMYLOIDOSIS, UNSPECIFIED: ICD-10-CM

## 2020-08-26 DIAGNOSIS — I48.92 UNSPECIFIED ATRIAL FLUTTER: ICD-10-CM

## 2020-08-26 DIAGNOSIS — I48.3 TYPICAL ATRIAL FLUTTER: ICD-10-CM

## 2020-08-26 DIAGNOSIS — Z90.89 ACQUIRED ABSENCE OF OTHER ORGANS: Chronic | ICD-10-CM

## 2020-08-26 DIAGNOSIS — H81.09 MENIERE'S DISEASE, UNSPECIFIED EAR: ICD-10-CM

## 2020-08-26 DIAGNOSIS — Z29.9 ENCOUNTER FOR PROPHYLACTIC MEASURES, UNSPECIFIED: ICD-10-CM

## 2020-08-26 LAB
ALBUMIN SERPL ELPH-MCNC: 4.8 G/DL — SIGNIFICANT CHANGE UP (ref 3.3–5)
ALP SERPL-CCNC: 72 U/L — SIGNIFICANT CHANGE UP (ref 40–120)
ALT FLD-CCNC: 13 U/L — SIGNIFICANT CHANGE UP (ref 10–45)
ANION GAP SERPL CALC-SCNC: 12 MMOL/L — SIGNIFICANT CHANGE UP (ref 5–17)
APTT BLD: 37.9 SEC — HIGH (ref 27.5–35.5)
AST SERPL-CCNC: 16 U/L — SIGNIFICANT CHANGE UP (ref 10–40)
BASOPHILS # BLD AUTO: 0.03 K/UL — SIGNIFICANT CHANGE UP (ref 0–0.2)
BASOPHILS NFR BLD AUTO: 0.4 % — SIGNIFICANT CHANGE UP (ref 0–2)
BILIRUB SERPL-MCNC: 0.3 MG/DL — SIGNIFICANT CHANGE UP (ref 0.2–1.2)
BLD GP AB SCN SERPL QL: NEGATIVE — SIGNIFICANT CHANGE UP
BUN SERPL-MCNC: 15 MG/DL — SIGNIFICANT CHANGE UP (ref 7–23)
CALCIUM SERPL-MCNC: 10.1 MG/DL — SIGNIFICANT CHANGE UP (ref 8.4–10.5)
CHLORIDE SERPL-SCNC: 100 MMOL/L — SIGNIFICANT CHANGE UP (ref 96–108)
CO2 SERPL-SCNC: 24 MMOL/L — SIGNIFICANT CHANGE UP (ref 22–31)
CREAT SERPL-MCNC: 1.05 MG/DL — SIGNIFICANT CHANGE UP (ref 0.5–1.3)
EOSINOPHIL # BLD AUTO: 0.06 K/UL — SIGNIFICANT CHANGE UP (ref 0–0.5)
EOSINOPHIL NFR BLD AUTO: 0.7 % — SIGNIFICANT CHANGE UP (ref 0–6)
GLUCOSE SERPL-MCNC: 97 MG/DL — SIGNIFICANT CHANGE UP (ref 70–99)
HCT VFR BLD CALC: 45.2 % — HIGH (ref 34.5–45)
HGB BLD-MCNC: 15.5 G/DL — SIGNIFICANT CHANGE UP (ref 11.5–15.5)
IMM GRANULOCYTES NFR BLD AUTO: 0.4 % — SIGNIFICANT CHANGE UP (ref 0–1.5)
INR BLD: 1.21 RATIO — HIGH (ref 0.88–1.16)
LYMPHOCYTES # BLD AUTO: 1.51 K/UL — SIGNIFICANT CHANGE UP (ref 1–3.3)
LYMPHOCYTES # BLD AUTO: 17.9 % — SIGNIFICANT CHANGE UP (ref 13–44)
MAGNESIUM SERPL-MCNC: 2.2 MG/DL — SIGNIFICANT CHANGE UP (ref 1.6–2.6)
MCHC RBC-ENTMCNC: 30.5 PG — SIGNIFICANT CHANGE UP (ref 27–34)
MCHC RBC-ENTMCNC: 34.3 GM/DL — SIGNIFICANT CHANGE UP (ref 32–36)
MCV RBC AUTO: 89 FL — SIGNIFICANT CHANGE UP (ref 80–100)
MONOCYTES # BLD AUTO: 0.76 K/UL — SIGNIFICANT CHANGE UP (ref 0–0.9)
MONOCYTES NFR BLD AUTO: 9 % — SIGNIFICANT CHANGE UP (ref 2–14)
NEUTROPHILS # BLD AUTO: 6.05 K/UL — SIGNIFICANT CHANGE UP (ref 1.8–7.4)
NEUTROPHILS NFR BLD AUTO: 71.6 % — SIGNIFICANT CHANGE UP (ref 43–77)
NRBC # BLD: 0 /100 WBCS — SIGNIFICANT CHANGE UP (ref 0–0)
PHOSPHATE SERPL-MCNC: 4 MG/DL — SIGNIFICANT CHANGE UP (ref 2.5–4.5)
PLATELET # BLD AUTO: 210 K/UL — SIGNIFICANT CHANGE UP (ref 150–400)
POTASSIUM SERPL-MCNC: 4.5 MMOL/L — SIGNIFICANT CHANGE UP (ref 3.5–5.3)
POTASSIUM SERPL-SCNC: 4.5 MMOL/L — SIGNIFICANT CHANGE UP (ref 3.5–5.3)
PROT SERPL-MCNC: 7.5 G/DL — SIGNIFICANT CHANGE UP (ref 6–8.3)
PROTHROM AB SERPL-ACNC: 14.3 SEC — HIGH (ref 10.6–13.6)
RBC # BLD: 5.08 M/UL — SIGNIFICANT CHANGE UP (ref 3.8–5.2)
RBC # FLD: 12 % — SIGNIFICANT CHANGE UP (ref 10.3–14.5)
RH IG SCN BLD-IMP: NEGATIVE — SIGNIFICANT CHANGE UP
RH IG SCN BLD-IMP: NEGATIVE — SIGNIFICANT CHANGE UP
SARS-COV-2 RNA SPEC QL NAA+PROBE: SIGNIFICANT CHANGE UP
SODIUM SERPL-SCNC: 136 MMOL/L — SIGNIFICANT CHANGE UP (ref 135–145)
TROPONIN T, HIGH SENSITIVITY RESULT: 10 NG/L — SIGNIFICANT CHANGE UP (ref 0–51)
TSH SERPL-MCNC: 2.59 UIU/ML — SIGNIFICANT CHANGE UP (ref 0.27–4.2)
WBC # BLD: 8.44 K/UL — SIGNIFICANT CHANGE UP (ref 3.8–10.5)
WBC # FLD AUTO: 8.44 K/UL — SIGNIFICANT CHANGE UP (ref 3.8–10.5)

## 2020-08-26 PROCEDURE — 99215 OFFICE O/P EST HI 40 MIN: CPT | Mod: PD

## 2020-08-26 PROCEDURE — 93000 ELECTROCARDIOGRAM COMPLETE: CPT | Mod: PD

## 2020-08-26 PROCEDURE — 99285 EMERGENCY DEPT VISIT HI MDM: CPT | Mod: GC

## 2020-08-26 PROCEDURE — 99223 1ST HOSP IP/OBS HIGH 75: CPT

## 2020-08-26 PROCEDURE — 93010 ELECTROCARDIOGRAM REPORT: CPT

## 2020-08-26 RX ORDER — UBIDECARENONE 100 MG
0 CAPSULE ORAL
Qty: 0 | Refills: 0 | DISCHARGE

## 2020-08-26 RX ORDER — SPIRONOLACTONE 25 MG/1
25 TABLET, FILM COATED ORAL DAILY
Refills: 0 | Status: DISCONTINUED | OUTPATIENT
Start: 2020-08-26 | End: 2020-08-28

## 2020-08-26 RX ORDER — APIXABAN 2.5 MG/1
1 TABLET, FILM COATED ORAL
Qty: 60 | Refills: 0

## 2020-08-26 RX ORDER — TOPIRAMATE 25 MG
100 TABLET ORAL AT BEDTIME
Refills: 0 | Status: DISCONTINUED | OUTPATIENT
Start: 2020-08-26 | End: 2020-08-28

## 2020-08-26 RX ORDER — RIBOFLAVIN (VITAMIN B2) 25 MG
0 TABLET ORAL
Qty: 0 | Refills: 0 | DISCHARGE

## 2020-08-26 RX ORDER — TOPIRAMATE 25 MG
1 TABLET ORAL
Qty: 0 | Refills: 0 | DISCHARGE

## 2020-08-26 RX ORDER — SPIRONOLACTONE 25 MG/1
1 TABLET, FILM COATED ORAL
Qty: 0 | Refills: 0 | DISCHARGE

## 2020-08-26 RX ORDER — NORTRIPTYLINE HYDROCHLORIDE 10 MG/1
40 CAPSULE ORAL AT BEDTIME
Refills: 0 | Status: DISCONTINUED | OUTPATIENT
Start: 2020-08-26 | End: 2020-08-28

## 2020-08-26 RX ORDER — SODIUM CHLORIDE 9 MG/ML
1000 INJECTION INTRAMUSCULAR; INTRAVENOUS; SUBCUTANEOUS ONCE
Refills: 0 | Status: COMPLETED | OUTPATIENT
Start: 2020-08-26 | End: 2020-08-26

## 2020-08-26 RX ORDER — PREGABALIN 225 MG/1
0 CAPSULE ORAL
Qty: 0 | Refills: 0 | DISCHARGE

## 2020-08-26 RX ORDER — NORTRIPTYLINE HYDROCHLORIDE 10 MG/1
4 CAPSULE ORAL
Qty: 0 | Refills: 0 | DISCHARGE

## 2020-08-26 RX ADMIN — SODIUM CHLORIDE 1000 MILLILITER(S): 9 INJECTION INTRAMUSCULAR; INTRAVENOUS; SUBCUTANEOUS at 14:30

## 2020-08-26 RX ADMIN — SODIUM CHLORIDE 1000 MILLILITER(S): 9 INJECTION INTRAMUSCULAR; INTRAVENOUS; SUBCUTANEOUS at 13:55

## 2020-08-26 RX ADMIN — Medication 100 MILLIGRAM(S): at 22:16

## 2020-08-26 RX ADMIN — NORTRIPTYLINE HYDROCHLORIDE 40 MILLIGRAM(S): 10 CAPSULE ORAL at 22:16

## 2020-08-26 NOTE — ED PROVIDER NOTE - ATTENDING CONTRIBUTION TO CARE
known atrial fibrillation on eliquis - sent to er from cards office w dizzy  tachy, reg  ekg appears to be aflutter. consult cards. consider that may need ep study or if decompensation cardioversion. labs.

## 2020-08-26 NOTE — ED ADULT NURSE NOTE - NS ED NURSE REPORT GIVEN DT

## 2020-08-26 NOTE — H&P ADULT - PROBLEM SELECTOR PLAN 1
Patient with atrial flutter, symptomatic  CHADSVASC 2  hold eliquis tonight  rate controlled, but fluctuating   Will monitor HR closely, hold off AM BB for now per cardiology  NPO after midnight  plan for ablation in AM

## 2020-08-26 NOTE — H&P ADULT - NSHPLABSRESULTS_GEN_ALL_CORE
Labs personally reviewed:                          15.5   8.44  )-----------( 210      ( 26 Aug 2020 14:03 )             45.2     08-26    136  |  100  |  15  ----------------------------<  97  4.5   |  24  |  1.05    Ca    10.1      26 Aug 2020 14:03  Phos  4.0     08-26  Mg     2.2     08-26    TPro  7.5  /  Alb  4.8  /  TBili  0.3  /  DBili  x   /  AST  16  /  ALT  13  /  AlkPhos  72  08-26        LIVER FUNCTIONS - ( 26 Aug 2020 14:03 )  Alb: 4.8 g/dL / Pro: 7.5 g/dL / ALK PHOS: 72 U/L / ALT: 13 U/L / AST: 16 U/L / GGT: x           PT/INR - ( 26 Aug 2020 14:03 )   PT: 14.3 sec;   INR: 1.21 ratio         PTT - ( 26 Aug 2020 14:03 )  PTT:37.9 sec    CAPILLARY BLOOD GLUCOSE    Reviewed previous records and consult notes    EKG personally reviewed: aflutter 2:1 conduction

## 2020-08-26 NOTE — H&P ADULT - NSICDXPASTMEDICALHX_GEN_ALL_CORE_FT
PAST MEDICAL HISTORY:  Amyloidosis     Atrial fibrillation/flutter     GERD (gastroesophageal reflux disease)     Meniere disease     Tinnitus     Vertigo

## 2020-08-26 NOTE — H&P ADULT - NSHPPHYSICALEXAM_GEN_ALL_CORE
PHYSICAL EXAM:  Vital Signs Last 24 Hrs  T(C): 36.6 (08-26-20 @ 17:00)  T(F): 97.9 (08-26-20 @ 17:00), Max: 98.6 (08-26-20 @ 13:21)  HR: 86 (08-26-20 @ 19:51) (86 - 129)  BP: 125/86 (08-26-20 @ 19:51)  BP(mean): 109 (08-26-20 @ 13:40) (109 - 109)  RR: 18 (08-26-20 @ 19:51) (16 - 20)  SpO2: 99% (08-26-20 @ 19:51) (97% - 100%)  Wt(kg): --    Constitutional: NAD, awake and alert  EYES: EOMI  ENT:  Normal Hearing, no tonsillar exudates   Neck: Soft and supple, No JVD  Lungs: Breath sounds are clear bilaterally, No wheezing, rales or rhonchi  Heart: S1 and S2, regular rate and rhythm, no Murmurs, gallops or rubs  Abdomen: Bowel Sounds present, soft, nontender, nondistended, no guarding, no rebound  Extremities: No cyanosis or clubbing; warm to touch  Vascular: 2+ peripheral pulses lower ex  Neurological: A/O x 3, no focal deficits  Musculoskeletal: 5/5 strength b/l upper and lower extremities  Skin: No rashes  Psych: no depression or anhedonia  HEME: no bruises, no nose bleeds PHYSICAL EXAM:  Vital Signs Last 24 Hrs  T(C): 36.6 (08-26-20 @ 17:00)  T(F): 97.9 (08-26-20 @ 17:00), Max: 98.6 (08-26-20 @ 13:21)  HR: 86 (08-26-20 @ 19:51) (86 - 129)  BP: 125/86 (08-26-20 @ 19:51)  BP(mean): 109 (08-26-20 @ 13:40) (109 - 109)  RR: 18 (08-26-20 @ 19:51) (16 - 20)  SpO2: 99% (08-26-20 @ 19:51) (97% - 100%)  Wt(kg): --    Constitutional: NAD, awake and alert  EYES: EOMI  ENT:  Normal Hearing, no tonsillar exudates   Neck: Soft and supple, No JVD  Lungs: Breath sounds are clear bilaterally, No wheezing, rales or rhonchi  Heart: S1 and S2, irregular, no Murmurs, gallops or rubs  Abdomen: Bowel Sounds present, soft, nontender, nondistended, no guarding, no rebound  Extremities: No cyanosis or clubbing; warm to touch  Vascular: 2+ peripheral pulses lower ex  Neurological: A/O x 3, no focal deficits  Musculoskeletal: 5/5 strength b/l upper and lower extremities  Skin: No rashes  Psych: no depression or anhedonia  HEME: no bruises, no nose bleeds

## 2020-08-26 NOTE — CONSULT NOTE ADULT - ASSESSMENT
70 year old female with past medical history of vertigo secondary to vestibular headaches,  with history of manifestations of sick sinus syndrome, profound bradycardia on significant beta blocker dose with first episode of atrial flutter last month (on Eliquis since July 20) which degenerated to atrial fibrillation, and converted on her own to sinus. Presented again today to her cardiologists office (Dr. Cummings) with complaints of dizziness and palpitations since noon yesterday. Review of EKG in ED consistent with atrial flutter.    1.Atrial Flutter    -Admit to tele  -Keep NPO after midnight for AFL ablation tomorrow  -Hold Eliquis starting tonight  -Hold AM Lopressor  -Risks and benefits discussed with patient, patient agreeable for ablation tomorrow.   -Patient seen with EP MD Meraz and plan d/w ED team    [id number] 70 year old female with past medical history of vertigo secondary to vestibular headaches, as per patients cardiologist with history of manifestations of sick sinus syndrome, profound bradycardia on significant beta blocker dose with first episode of atrial flutter last month (on Eliquis since July 20) which degenerated to atrial fibrillation, and converted on her own to sinus. Presented again today to her cardiologists office (Dr. Cummings) with complaints of dizziness and palpitations since noon yesterday. Review of EKG in ED consistent with atrial flutter.    1.Atrial Flutter    -Admit to tele  -Keep NPO after midnight for AFL ablation tomorrow  -Hold Eliquis starting tonight  -Hold AM Lopressor  -Risks and benefits discussed with patient, patient agreeable for ablation tomorrow.   -Patient seen with EP MD Meraz and plan d/w ED team    [id number]

## 2020-08-26 NOTE — H&P ADULT - HISTORY OF PRESENT ILLNESS
71 yo female with PMH of meniere's disease, pulmonary amyloidosis, Aflutter/Afib on eliquis, GERD, sick sinus syndrome (tendency for bradycardia), presents here with dizziness and palpitation.      Of note, patient had extensive workup for amyloidosis, proven on lung biopsy in 10/2019, AL-lambda by mass spec. SPEP, UPEP negative, fat pad bx negative for congo red staining.  She was determined to have pulmonary amyloid without systemic involvement.  Patient was admitted in July for similar complaints of dizziness and lightheadedness.  EKG was consistent with pAfib, s/p IV hydration, started on lopressor 12.5mg bid, but had episodes of bradycardia, therefore BB was discontinued.  D-dimer, thyroid panel were WNL, echo was normal.  For meniere's disease, patient has been on aldactone, topamax and nortriptyline. 69 yo female with PMH of meniere's disease, pulmonary amyloidosis, Aflutter/Afib on eliquis, GERD, sick sinus syndrome (tendency for bradycardia), presents here with dizziness and palpitation.      Of note, patient had extensive workup for amyloidosis, proven on lung biopsy in 10/2019, AL-lambda by mass spec. SPEP, UPEP negative, fat pad bx negative for congo red staining.  She was determined to have pulmonary amyloid without systemic involvement.  Patient was admitted in July 2020 for similar complaints of dizziness and lightheadedness.  EKG was consistent with pAfib, s/p IV hydration, started on lopressor 12.5mg bid, but had episodes of bradycardia, therefore BB was discontinued.  D-dimer, thyroid panel were WNL, echo was normal.  For meniere's disease, patient has been on aldactone, topamax and nortriptyline.     After discharge from hospital, patient had a follow up with her cardiologist, who started her back on lopressor 25mg ER.  Patient states she was doing well.  On August 20th, she had one episode of tachycardia, but she was asymptomatic.  Again yesterday, she has having tachycardia all day, but she was asymptomatic.  Today she has been feeling "off" all day.  She has been feeling lightheaded with minimal movement.  She denies room is spinning or new hearing changes.  She went to her cardiologist office, who sent her here for further evaluation and management.  Otherwise, she denies fever, chills, nausea, vomiting, chest pain, SOB.

## 2020-08-26 NOTE — ED ADULT NURSE REASSESSMENT NOTE - NS ED NURSE REASSESS COMMENT FT1
pt resting on stretcher, pt is alert and oriented x3, speaking full clear sentences, respirations non-labored, skin warm dry and intact, strong pulses throughout.  pt denies any shortness of breath , chest pain or palpitations.  pt denies any pain, pt denies any needs at this time. vital signs stable, rn will continue to monitor.

## 2020-08-26 NOTE — HISTORY OF PRESENT ILLNESS
[FreeTextEntry1] : 70-year-old female with a history of paroxysmal atrial fibrillation, sinus node dysfunction, and vertigo.  She was recently started on metoprolol 25 mg/day.  Starting about 24 hours ago she noticed that her heart rate was rapid.  She was in not in distress, but when she woke this morning she was still rapid and she became very uncomfortable around 9:00.  She is still uncomfortable although does not feel faint or short of breath.

## 2020-08-26 NOTE — CONSULT NOTE ADULT - ATTENDING COMMENTS
Patient seen and examined in the ED. She is in an atrial tachycardia vs clockwise flutter. Echo was reviewed and is normal. I will ascertain the diagnosis of amyloidosis in more detail, but no cardiac evidence at this time. Will proceed with EPS and ablation tomorrow. Total time spent 60 minutes including history, physical and review of all data.

## 2020-08-26 NOTE — ED ADULT NURSE REASSESSMENT NOTE - NS ED NURSE REASSESS COMMENT FT1
Cardiology MD at pt bedside discussing plan of care. Pt understands possibility of admission to hospital, resting in bed comfortably, safety maintained.

## 2020-08-26 NOTE — ED ADULT NURSE REASSESSMENT NOTE - NS ED NURSE REASSESS COMMENT FT1
EP MD Meraz at pt bedside discussing plan of care and admission to hospital with pt. Pt denies CP/SOB/palpitations. Pt resting in bed, safety maintained.

## 2020-08-26 NOTE — ED ADULT NURSE NOTE - OBJECTIVE STATEMENT
Pt is a 69 y/o F, PMH vertigo, recently diagnosed with afib (Lopressor & Eliquis), presenting to the ED c/o dizziness and palpitations x yesterday at noon. Pt was at her cardiologists today and was referred to the ER for control of her HR. Pt denies headache, chest pain, cough, SOB, abdominal pain, n/v/d, urinary symptoms, fevers, chills, weakness at this time. Pt is A&Ox4, ambulates independently and moves all extremities. Pt placed on CCM, call bell at bedside, safety maintained.

## 2020-08-26 NOTE — ED ADULT NURSE NOTE - NSSUHOSCREENINGYN_ED_ALL_ED
[facility] [address] [facility] and cancelled E-Scribed prescriptions ( Tessalon 200 mg cap & Vibramycin 100 mg cap) today and re-order as per patient request to [address] in [place], [place]  Yes - the patient is able to be screened

## 2020-08-26 NOTE — DISCUSSION/SUMMARY
[FreeTextEntry1] : Ms Draper has had a recurrence of her rapid heart beating, the first since she was started on metoprolol.  Her exam shows normal blood pressure, clear lungs, regular tachycardia at a rate of 130, and no peripheral edema.  Her EKG shows a regular tachycardia and is probably her atrial flutter pattern.\par \par She is in no great distress, but her heart rate is rapid and because of the history of sinus bradycardia I think further treatment should be done in the hospital.  She was advised to go to the emergency room.

## 2020-08-26 NOTE — ED PROVIDER NOTE - OBJECTIVE STATEMENT
69yo F pmhx vertigo, recently diagnosed with afib on lopressor and Eliquis p/w CC dizziness/palpitations since yesterday at noon, seen by her cardiologist this AM who referred her to the ED for rate control. Pt. denies fever chills cp cough SOB n/v/d abd pain.

## 2020-08-26 NOTE — ED PROVIDER NOTE - CLINICAL SUMMARY MEDICAL DECISION MAKING FREE TEXT BOX
69yo F pmhx vertigo, recently diagnosed with afib on lopressor and eliquis p/w CC palpitations sent in from cardiologist office, appears to be in afib vs flutter on EKG with rate in 120s, normotensive, mentating well, will start fluids consider lopressor, send labs ekg cxr and contact pts. cardiologist

## 2020-08-26 NOTE — ED ADULT NURSE NOTE - NSIMPLEMENTINTERV_GEN_ALL_ED
Implemented All Universal Safety Interventions:  [place] to call system. Call bell, personal items and telephone within reach. Instruct patient to call for assistance. Room bathroom lighting operational. Non-slip footwear when patient is off stretcher. Physically safe environment: no spills, clutter or unnecessary equipment. Stretcher in lowest position, wheels locked, appropriate side rails in place.

## 2020-08-26 NOTE — H&P ADULT - NSHPREVIEWOFSYSTEMS_GEN_ALL_CORE
CONSTITUTIONAL: No weakness, fevers or chills  EYES/ENT: No visual changes;  No dysphagia; hearing loss left ear (chronic)  NECK: No pain or stiffness  RESPIRATORY: No cough, wheezing, hemoptysis; No shortness of breath  CARDIOVASCULAR: +palpitation  EXTREMITIES: no le edema, cyanosis, clubbing  MUSCULOSKELETAL: no joint pain, swelling  GASTROINTESTINAL: No abdominal or epigastric pain. No nausea, vomiting, or hematemesis; No diarrhea or constipation. No melena or hematochezia.  BACK: No back pain  GENITOURINARY: No dysuria, frequency or hematuria  NEUROLOGICAL: No numbness or weakness  SKIN: No itching, burning, rashes, or lesions   PSYCH: no agitation  All other review of systems is negative unless indicated above.

## 2020-08-26 NOTE — PHYSICAL EXAM
[Normal Appearance] : normal appearance [General Appearance - Well Developed] : well developed [No Deformities] : no deformities [General Appearance - In No Acute Distress] : no acute distress [Well Groomed] : well groomed [General Appearance - Well Nourished] : well nourished [Normal Conjunctiva] : the conjunctiva exhibited no abnormalities [Eyelids - No Xanthelasma] : the eyelids demonstrated no xanthelasmas [Normal Oral Mucosa] : normal oral mucosa [No Oral Pallor] : no oral pallor [No Oral Cyanosis] : no oral cyanosis [No Jugular Venous Turner A Waves] : no jugular venous turner A waves [Normal Jugular Venous A Waves Present] : normal jugular venous A waves present [Normal Jugular Venous V Waves Present] : normal jugular venous V waves present [Respiration, Rhythm And Depth] : normal respiratory rhythm and effort [Auscultation Breath Sounds / Voice Sounds] : lungs were clear to auscultation bilaterally [Exaggerated Use Of Accessory Muscles For Inspiration] : no accessory muscle use [Heart Sounds] : normal S1 and S2 [Murmurs] : no murmurs present [Abdomen Soft] : soft [Abdomen Tenderness] : non-tender [Abdomen Mass (___ Cm)] : no abdominal mass palpated [Abnormal Walk] : normal gait [Gait - Sufficient For Exercise Testing] : the gait was sufficient for exercise testing [Nail Clubbing] : no clubbing of the fingernails [Petechial Hemorrhages (___cm)] : no petechial hemorrhages [Cyanosis, Localized] : no localized cyanosis [No Venous Stasis] : no venous stasis [Skin Color & Pigmentation] : normal skin color and pigmentation [] : no rash [Skin Lesions] : no skin lesions [No Xanthoma] : no  xanthoma was observed [No Skin Ulcers] : no skin ulcer [Mood] : the mood was normal [Affect] : the affect was normal [Oriented To Time, Place, And Person] : oriented to person, place, and time [No Anxiety] : not feeling anxious [FreeTextEntry1] : Rate 125 and regular no murmur

## 2020-08-26 NOTE — CONSULT NOTE ADULT - SUBJECTIVE AND OBJECTIVE BOX
CHIEF COMPLAINT:  Tachycardia    HISTORY OF PRESENT ILLNESS:  70 year old female with past medical history of vertigo secondary to vestibular headaches,  with history of manifestations of sick sinus syndrome, profound bradycardia on significant beta blocker dose with first episode of atrial flutter last month (on Eliquis since July 20) which degenerated to atrial fibrillation, and converted on her own to sinus. Presented again today to her cardiologists office (Dr. Cummings) with complaints of dizziness and palpitations since noon yesterday. Review of EKG in ED consistent with atrial flutter. Denies chest pain, palpitations, dizziness, lightheadedness, and shortness of breath.       Allergies  No Known Allergies    	    MEDICATIONS:  Betahistine HCl Powder; USE AS DIRECTED  Eliquis 5 MG Oral Tablet; Take 1 tablet twice a day  Metoprolol Succinate ER 25 MG Oral Tablet Extended Release 24 Hour; TAKE 1 TABLET  ONCE DAILY  Nortriptyline HCl - 10 MG Oral Capsule; TAKE 4 CAPSULES AT BEDTIME  Spironolactone 25 MG Oral Tablet; TAKE 1 TABLET DAILY  Topamax 25 MG Oral Tablet; TAKE 3 TABLET AT BEDTIME        PAST MEDICAL & SURGICAL HISTORY:  Amyloidosis  Meniere disease  Tinnitus  Vertigo  GERD (gastroesophageal reflux disease)  No significant past surgical history      FAMILY HISTORY:  FH: hypertension      SOCIAL HISTORY:    No toxic habits      REVIEW OF SYSTEMS:  See HPI. Otherwise, 10 point ROS done and otherwise negative.    PHYSICAL EXAM:  T(C): 36.6 (08-26-20 @ 17:00), Max: 37 (08-26-20 @ 13:21)  HR: 120 (08-26-20 @ 17:00) (95 - 129)  BP: 122/98 (08-26-20 @ 17:00) (103/65 - 133/98)  RR: 20 (08-26-20 @ 17:00) (19 - 20)  SpO2: 100% (08-26-20 @ 17:00) (99% - 100%)  Wt(kg): --  I&O's Summary      Appearance: Normal	  Cardiovascular: Normal S1 S2, No JVD, No murmurs, No edema  Respiratory: Lungs clear to auscultation	  Psychiatry: A & O x 3, Mood & affect appropriate  Skin: No rashes, No ecchymoses, No cyanosis	  Extremities: Normal range of motion, No clubbing, cyanosis or edema  Vascular: Peripheral pulses palpable 2+ bilaterally        LABS:	 	    CBC Full  -  ( 26 Aug 2020 14:03 )  WBC Count : 8.44 K/uL  Hemoglobin : 15.5 g/dL  Hematocrit : 45.2 %  Platelet Count - Automated : 210 K/uL  Mean Cell Volume : 89.0 fl  Mean Cell Hemoglobin : 30.5 pg  Mean Cell Hemoglobin Concentration : 34.3 gm/dL  Auto Neutrophil # : 6.05 K/uL  Auto Lymphocyte # : 1.51 K/uL  Auto Monocyte # : 0.76 K/uL  Auto Eosinophil # : 0.06 K/uL  Auto Basophil # : 0.03 K/uL  Auto Neutrophil % : 71.6 %  Auto Lymphocyte % : 17.9 %  Auto Monocyte % : 9.0 %  Auto Eosinophil % : 0.7 %  Auto Basophil % : 0.4 %    08-26    136  |  100  |  15  ----------------------------<  97  4.5   |  24  |  1.05    Ca    10.1      26 Aug 2020 14:03  Phos  4.0     08-26  Mg     2.2     08-26    TPro  7.5  /  Alb  4.8  /  TBili  0.3  /  DBili  x   /  AST  16  /  ALT  13  /  AlkPhos  72  08-26      TSH: Thyroid Stimulating Hormone, Serum: 3.92 uIU/mL (07.21.20 @ 09:32)          TELEMETRY: -120 bpm	    ECG: Typical AFL  	    PREVIOUS DIAGNOSTIC TESTING:    [X] Echocardiogram:  < from: Transthoracic Echocardiogram (07.21.20 @ 13:45) >    Patient name: DEREK MULLIGAN  YOB: 1950   Age: 70 (F)   MR#: [id number]  Study Date: 7/21/2020  [facility]  Study quality: Technically good  Referring Physician: Samantha Munson MD  Blood Pressure: 126/75 mmHg  Height: 173 cm  Weight: 74 kg  BSA: 1.9 m2  ------------------------------------------------------------------------  PROCEDURE: Transthoracic echocardiogram with 2-D, M-Mode  and complete spectral and color flow Doppler.  INDICATION: Syncope and collapse (R55)  ------------------------------------------------------------------------  Dimensions:    Normal Values:  LA:     3.7    2.0 - 4.0 cm  Ao:     2.8    2.0 - 3.8 cm  SEPTUM: 0.8    0.6 - 1.2 cm  PWT:    0.8    0.6 - 1.1 cm  LVIDd:  4.0    3.0 - 5.6 cm  LVIDs:  2.5    1.8 - 4.0 cm  Derived variables:  LVMI: 50 g/m2  RWT: 0.40  EF (Visual Estimate): 70 %  Doppler Peak Velocity (m/sec): TV=2.3  ------------------------------------------------------------------------  Observations:  Mitral Valve: Mitral annular calcification. Mild mitral  regurgitation.  Aortic Valve/Aorta: Normal aortic valve. Minimal aortic  regurgitation.  Normal aortic root size.  Left Atrium: Normal left atrium.  Left Ventricle: Normal left ventricular internal  dimensions. Discrete basal septal hypertrophy.  Normal left ventricular systolic function. No segmental  wall motion abnormalities.  Normal diastolic function.  Right Heart: Normal right atrium. Normal right ventricular  size and function.  Normal tricuspid valve. Mild tricuspid regurgitation.  Normal pulmonic valve.  Pericardium/Pleura: Normal pericardium with no pericardial  effusion.  Hemodynamic: Estimated right atrial pressire is normal.  No evidence of pulmonary hypertension.  No PFO seen with color Doppler.  ------------------------------------------------------------------------  Conclusions:  Normal left ventricular systolic function. No segmental  wall motion abnormalities.  ------------------------------------------------------------------------  Confirmed on  7/21/2020 - 15:36:21 by Huber Verde MD, FASE  ------------------------------------------------------------------------    < end of copied text > CHIEF COMPLAINT:  Tachycardia    HISTORY OF PRESENT ILLNESS:  70 year old female with past medical history of vertigo secondary to vestibular headaches,  with history of manifestations of sick sinus syndrome, profound bradycardia on significant beta blocker dose with first episode of atrial flutter last month (on Eliquis since July 20) which degenerated to atrial fibrillation, and converted on her own to sinus. Presented again today to her cardiologists office (Dr. Cummings) with complaints of dizziness and palpitations since noon yesterday. Review of EKG in ED consistent with atrial flutter. Denies chest pain, lightheadedness, and shortness of breath.       Allergies  No Known Allergies    	    MEDICATIONS:  Betahistine HCl Powder; USE AS DIRECTED  Eliquis 5 MG Oral Tablet; Take 1 tablet twice a day  Metoprolol Succinate ER 25 MG Oral Tablet Extended Release 24 Hour; TAKE 1 TABLET  ONCE DAILY  Nortriptyline HCl - 10 MG Oral Capsule; TAKE 4 CAPSULES AT BEDTIME  Spironolactone 25 MG Oral Tablet; TAKE 1 TABLET DAILY  Topamax 25 MG Oral Tablet; TAKE 3 TABLET AT BEDTIME        PAST MEDICAL & SURGICAL HISTORY:  Amyloidosis  Meniere disease  Tinnitus  Vertigo  GERD (gastroesophageal reflux disease)  No significant past surgical history      FAMILY HISTORY:  FH: hypertension      SOCIAL HISTORY:    No toxic habits      REVIEW OF SYSTEMS:  See HPI. Otherwise, 10 point ROS done and otherwise negative.    PHYSICAL EXAM:  T(C): 36.6 (08-26-20 @ 17:00), Max: 37 (08-26-20 @ 13:21)  HR: 120 (08-26-20 @ 17:00) (95 - 129)  BP: 122/98 (08-26-20 @ 17:00) (103/65 - 133/98)  RR: 20 (08-26-20 @ 17:00) (19 - 20)  SpO2: 100% (08-26-20 @ 17:00) (99% - 100%)  Wt(kg): --  I&O's Summary      Appearance: Normal	  Cardiovascular: Normal S1 S2, No JVD, No murmurs, No edema  Respiratory: Lungs clear to auscultation	  Psychiatry: A & O x 3, Mood & affect appropriate  Skin: No rashes, No ecchymoses, No cyanosis	  Extremities: Normal range of motion, No clubbing, cyanosis or edema  Vascular: Peripheral pulses palpable 2+ bilaterally        LABS:	 	    CBC Full  -  ( 26 Aug 2020 14:03 )  WBC Count : 8.44 K/uL  Hemoglobin : 15.5 g/dL  Hematocrit : 45.2 %  Platelet Count - Automated : 210 K/uL  Mean Cell Volume : 89.0 fl  Mean Cell Hemoglobin : 30.5 pg  Mean Cell Hemoglobin Concentration : 34.3 gm/dL  Auto Neutrophil # : 6.05 K/uL  Auto Lymphocyte # : 1.51 K/uL  Auto Monocyte # : 0.76 K/uL  Auto Eosinophil # : 0.06 K/uL  Auto Basophil # : 0.03 K/uL  Auto Neutrophil % : 71.6 %  Auto Lymphocyte % : 17.9 %  Auto Monocyte % : 9.0 %  Auto Eosinophil % : 0.7 %  Auto Basophil % : 0.4 %    08-26    136  |  100  |  15  ----------------------------<  97  4.5   |  24  |  1.05    Ca    10.1      26 Aug 2020 14:03  Phos  4.0     08-26  Mg     2.2     08-26    TPro  7.5  /  Alb  4.8  /  TBili  0.3  /  DBili  x   /  AST  16  /  ALT  13  /  AlkPhos  72  08-26      TSH: Thyroid Stimulating Hormone, Serum: 3.92 uIU/mL (07.21.20 @ 09:32)          TELEMETRY: -120 bpm	    ECG: Typical AFL  	    PREVIOUS DIAGNOSTIC TESTING:    [X] Echocardiogram:  < from: Transthoracic Echocardiogram (07.21.20 @ 13:45) >    Patient name: DEREK MULLIGAN  YOB: 1950   Age: 70 (F)   MR#: [id number]  Study Date: 7/21/2020  [facility]  Study quality: Technically good  Referring Physician: Samantha Munson MD  Blood Pressure: 126/75 mmHg  Height: 173 cm  Weight: 74 kg  BSA: 1.9 m2  ------------------------------------------------------------------------  PROCEDURE: Transthoracic echocardiogram with 2-D, M-Mode  and complete spectral and color flow Doppler.  INDICATION: Syncope and collapse (R55)  ------------------------------------------------------------------------  Dimensions:    Normal Values:  LA:     3.7    2.0 - 4.0 cm  Ao:     2.8    2.0 - 3.8 cm  SEPTUM: 0.8    0.6 - 1.2 cm  PWT:    0.8    0.6 - 1.1 cm  LVIDd:  4.0    3.0 - 5.6 cm  LVIDs:  2.5    1.8 - 4.0 cm  Derived variables:  LVMI: 50 g/m2  RWT: 0.40  EF (Visual Estimate): 70 %  Doppler Peak Velocity (m/sec): TV=2.3  ------------------------------------------------------------------------  Observations:  Mitral Valve: Mitral annular calcification. Mild mitral  regurgitation.  Aortic Valve/Aorta: Normal aortic valve. Minimal aortic  regurgitation.  Normal aortic root size.  Left Atrium: Normal left atrium.  Left Ventricle: Normal left ventricular internal  dimensions. Discrete basal septal hypertrophy.  Normal left ventricular systolic function. No segmental  wall motion abnormalities.  Normal diastolic function.  Right Heart: Normal right atrium. Normal right ventricular  size and function.  Normal tricuspid valve. Mild tricuspid regurgitation.  Normal pulmonic valve.  Pericardium/Pleura: Normal pericardium with no pericardial  effusion.  Hemodynamic: Estimated right atrial pressire is normal.  No evidence of pulmonary hypertension.  No PFO seen with color Doppler.  ------------------------------------------------------------------------  Conclusions:  Normal left ventricular systolic function. No segmental  wall motion abnormalities.  ------------------------------------------------------------------------  Confirmed on  7/21/2020 - 15:36:21 by Huber Verde MD, FASE  ------------------------------------------------------------------------    < end of copied text > CHIEF COMPLAINT:  Tachycardia    HISTORY OF PRESENT ILLNESS:  70 year old female with past medical history of vertigo secondary to vestibular headaches, as per patients cardiologist with history of manifestations of sick sinus syndrome, profound bradycardia on significant beta blocker dose with first episode of atrial flutter last month (on Eliquis since July 20) which degenerated to atrial fibrillation, and converted on her own to sinus. Presented again today to her cardiologists office (Dr. Cummings) with complaints of dizziness and palpitations since noon yesterday. Review of EKG in ED consistent with atrial flutter. Denies chest pain, lightheadedness, and shortness of breath.       Allergies  No Known Allergies    	    MEDICATIONS:  Betahistine HCl Powder; USE AS DIRECTED  Eliquis 5 MG Oral Tablet; Take 1 tablet twice a day  Metoprolol Succinate ER 25 MG Oral Tablet Extended Release 24 Hour; TAKE 1 TABLET  ONCE DAILY  Nortriptyline HCl - 10 MG Oral Capsule; TAKE 4 CAPSULES AT BEDTIME  Spironolactone 25 MG Oral Tablet; TAKE 1 TABLET DAILY  Topamax 25 MG Oral Tablet; TAKE 3 TABLET AT BEDTIME        PAST MEDICAL & SURGICAL HISTORY:  Amyloidosis  Meniere disease  Tinnitus  Vertigo  GERD (gastroesophageal reflux disease)  No significant past surgical history      FAMILY HISTORY:  FH: hypertension      SOCIAL HISTORY:    No toxic habits      REVIEW OF SYSTEMS:  See HPI. Otherwise, 10 point ROS done and otherwise negative.    PHYSICAL EXAM:  T(C): 36.6 (08-26-20 @ 17:00), Max: 37 (08-26-20 @ 13:21)  HR: 120 (08-26-20 @ 17:00) (95 - 129)  BP: 122/98 (08-26-20 @ 17:00) (103/65 - 133/98)  RR: 20 (08-26-20 @ 17:00) (19 - 20)  SpO2: 100% (08-26-20 @ 17:00) (99% - 100%)  Wt(kg): --  I&O's Summary      Appearance: Normal	  Cardiovascular: Normal S1 S2, No JVD, No murmurs, No edema  Respiratory: Lungs clear to auscultation	  Psychiatry: A & O x 3, Mood & affect appropriate  Skin: No rashes, No ecchymoses, No cyanosis	  Extremities: Normal range of motion, No clubbing, cyanosis or edema  Vascular: Peripheral pulses palpable 2+ bilaterally        LABS:	 	    CBC Full  -  ( 26 Aug 2020 14:03 )  WBC Count : 8.44 K/uL  Hemoglobin : 15.5 g/dL  Hematocrit : 45.2 %  Platelet Count - Automated : 210 K/uL  Mean Cell Volume : 89.0 fl  Mean Cell Hemoglobin : 30.5 pg  Mean Cell Hemoglobin Concentration : 34.3 gm/dL  Auto Neutrophil # : 6.05 K/uL  Auto Lymphocyte # : 1.51 K/uL  Auto Monocyte # : 0.76 K/uL  Auto Eosinophil # : 0.06 K/uL  Auto Basophil # : 0.03 K/uL  Auto Neutrophil % : 71.6 %  Auto Lymphocyte % : 17.9 %  Auto Monocyte % : 9.0 %  Auto Eosinophil % : 0.7 %  Auto Basophil % : 0.4 %    08-26    136  |  100  |  15  ----------------------------<  97  4.5   |  24  |  1.05    Ca    10.1      26 Aug 2020 14:03  Phos  4.0     08-26  Mg     2.2     08-26    TPro  7.5  /  Alb  4.8  /  TBili  0.3  /  DBili  x   /  AST  16  /  ALT  13  /  AlkPhos  72  08-26      TSH: Thyroid Stimulating Hormone, Serum: 3.92 uIU/mL (07.21.20 @ 09:32)          TELEMETRY: -120 bpm	    ECG: Typical AFL  	    PREVIOUS DIAGNOSTIC TESTING:    [X] Echocardiogram:  < from: Transthoracic Echocardiogram (07.21.20 @ 13:45) >    Patient name: DEREK MULLIGAN  YOB: 1950   Age: 70 (F)   MR#: [id number]  Study Date: 7/21/2020  [facility]  Study quality: Technically good  Referring Physician: Samantha Munson MD  Blood Pressure: 126/75 mmHg  Height: 173 cm  Weight: 74 kg  BSA: 1.9 m2  ------------------------------------------------------------------------  PROCEDURE: Transthoracic echocardiogram with 2-D, M-Mode  and complete spectral and color flow Doppler.  INDICATION: Syncope and collapse (R55)  ------------------------------------------------------------------------  Dimensions:    Normal Values:  LA:     3.7    2.0 - 4.0 cm  Ao:     2.8    2.0 - 3.8 cm  SEPTUM: 0.8    0.6 - 1.2 cm  PWT:    0.8    0.6 - 1.1 cm  LVIDd:  4.0    3.0 - 5.6 cm  LVIDs:  2.5    1.8 - 4.0 cm  Derived variables:  LVMI: 50 g/m2  RWT: 0.40  EF (Visual Estimate): 70 %  Doppler Peak Velocity (m/sec): TV=2.3  ------------------------------------------------------------------------  Observations:  Mitral Valve: Mitral annular calcification. Mild mitral  regurgitation.  Aortic Valve/Aorta: Normal aortic valve. Minimal aortic  regurgitation.  Normal aortic root size.  Left Atrium: Normal left atrium.  Left Ventricle: Normal left ventricular internal  dimensions. Discrete basal septal hypertrophy.  Normal left ventricular systolic function. No segmental  wall motion abnormalities.  Normal diastolic function.  Right Heart: Normal right atrium. Normal right ventricular  size and function.  Normal tricuspid valve. Mild tricuspid regurgitation.  Normal pulmonic valve.  Pericardium/Pleura: Normal pericardium with no pericardial  effusion.  Hemodynamic: Estimated right atrial pressire is normal.  No evidence of pulmonary hypertension.  No PFO seen with color Doppler.  ------------------------------------------------------------------------  Conclusions:  Normal left ventricular systolic function. No segmental  wall motion abnormalities.  ------------------------------------------------------------------------  Confirmed on  7/21/2020 - 15:36:21 by Huber Verde MD, FASE  ------------------------------------------------------------------------    < end of copied text >

## 2020-08-26 NOTE — H&P ADULT - ATTENDING COMMENTS
Patient assigned to me by night hospitalist in charge for management and care for patient for this evening only. Care to be resumed by day hospitalist (Dr. Munson) in the morning and thereafter.     Patient's care rendered on 8/26/20 at 9PM.      Plan discussed with Patient, RN

## 2020-08-26 NOTE — H&P ADULT - ASSESSMENT
71 yo female with PMH of meniere's disease, pulmonary amyloidosis, Aflutter/Afib on eliquis, GERD, sick sinus syndrome (tendency for bradycardia), presents here with dizziness and palpitation.

## 2020-08-26 NOTE — ED CLERICAL - NS ED CLERK NOTE PRE-ARRIVAL INFORMATION; ADDITIONAL PRE-ARRIVAL INFORMATION
CC/Reason For referral: Pt in afib and its very fast   Preferred Consultant(if applicable): Ritesh Arcos   Who admits for you (if needed): Dr. Cummings  Do you have documents you would like to fax over? no  Would you still like to speak to an ED attending? nio

## 2020-08-26 NOTE — CONSULT NOTE ADULT - ASSESSMENT
69 year old woman with manifestations of sick sinus syndrome, profound bradycardia on significant beta blocker dose and first episode of atrial flutter last month which degenerated to atrial fibrillation, then to sinus. Presents again with atrial flutter. To be evaluated by Electrophysiology for possible flutter ablation. Maintained on anticoagulation with apixaban since July 20. Took her dose this morning. Continue all other home medications.

## 2020-08-26 NOTE — CONSULT NOTE ADULT - SUBJECTIVE AND OBJECTIVE BOX
HPI:  Mrs. Chika Draper is a 69-year-old woman evaluated in the past for mild sick sinus syndrome with tendency to bradycardia and felt to have no significant cardiac disease. After a busy day at work onset of sense that her vision was "swimming" and she tolerated for several hours, but when it persisted the following morning went to the emergency room. She had a complete neurological evaluation and MRI/MRA and was concluded not to have any central nervous system abnormality. She was observed for bradycardia in the CDU and had an echocardiogram that was entirely normal. She was discharged and advised to followup with cardiologist.    Remained well for a time, then sudden episode of profound weakness. Could barely stand up, unable to get to car. Called ambulance and went to [facility] ER. Blood pressure elevated in range of 200/100, but declined without specific treatment. CT head, MRI, complete blood tests all normal and then cardiac echo normal as well including normal bubble study. Had.renal sonogram and urine for metanephrines and saw Dr. Roach in office. Has had further neurology evaluation and presumed diagnosis of Meniere's and had monthly severe vertiginous spells. Neurologist proposed starting acetazolamide, then increased . Event Monitor proved unremarkable.    Has ongoing efforts to control M©ni¨re's disease and continuing modification of medication regimen. She is no longer on Diamox and is now on spironolactone, but episodes of vertigo and poor balance persisted until addition of topiramate.     Four weeks ago woke early in morning dizzy, EMS found hypotensive in 60s, in ER blood pressure satisfactory, but tachycardia interpreted as sinus tach, however probably atrial tachycardia at 118 and on further review have concluded it was atypical atrial flutter with 2:1 block. After few hours broke to sinus and then on further monitoring had runs of atrial fibrillation and started on anticoagulation with apixaban. Was aware of SVT, but not of runs of atrial fibrillation and remained with no palpitations after hospital discharge until last night. Tachycardia persisted and came to office today, found in SVT, persistent at 127 and sent to ER. In ER given fluids and rhythm slowed, but still appears to be atrial flutter now with variable block.         PMH:   Amyloidosis (npn-cardiac)  Meniere disease  Bradycardia  Tinnitus  Vertigo  GERD (gastroesophageal reflux disease)  No pertinent past medical history    PSH:   No significant past surgical history    Medications:   Betahistine HCl Powder; USE AS DIRECTED  Eliquis 5 MG Oral Tablet; Take 1 tablet twice a day  Metoprolol Succinate ER 25 MG Oral Tablet Extended Release 24 Hour; TAKE 1 TABLET  ONCE DAILY  Nortriptyline HCl - 10 MG Oral Capsule; TAKE 4 CAPSULES AT BEDTIME  Spironolactone 25 MG Oral Tablet; TAKE 1 TABLET DAILY  Topamax 25 MG Oral Tablet; TAKE 3 TABLET AT BEDTIME      Allergies:  No Known Allergies    FAMILY HISTORY:  FH: hypertension    Social History:  Smoking:  Alcohol:  Drugs:    Physical Exam  Constitutional: well developed, normal appearance, well groomed, well nourished, no deformities and no acute distress.   Eyes: the conjunctiva exhibited no abnormalities and the eyelids demonstrated no xanthelasmas.   HEENT: normal oral mucosa, no oral pallor and no oral cyanosis.   Neck: normal jugular venous A waves present, normal jugular venous V waves present and no jugular venous adam A waves.   Pulmonary: no respiratory distress, normal respiratory rhythm and effort, no accessory muscle use and lungs were clear to auscultation bilaterally.   Cardiovascular: The PMI was palpated at the 5th LICS in the midclavicular line. The apical impulse was normal. The heart rate was rapid then slowing and rhythm initially rapid and regular, then normal rate, irregularly irregular. Heart sounds: normal S1, normal S2, no gallop heard, no click. prominent, but physiologic split 2nd sound.   Murmurs: no murmurs heard.   Pulses: Carotid: right 2+, left 2+, no bruit heard over the right carotid and no bruit heard over the left carotid. Posterior tibialis: right 2+ and left 2+. Dorsalis pedis: right 2+ and left 2+.   Abdominal Aorta: the abdominal aorta was not enlarged and no bruit was heard.   Lower Extremities: no pitting edema present.   Abdomen: soft, non-tender, no hepato-splenomegaly and no abdominal mass palpated.   Musculoskeletal: normal gait . the gait was sufficient for exercise testing.   Extremities: no clubbing of the fingernails, no localized cyanosis, no petechial hemorrhages and no ischemic changes.   Skin: normal skin color and pigmentation, no rash, no venous stasis, no skin lesions, no skin ulcer and no xanthoma was observed.   Psychiatric: oriented to person, place, and time, the affect was normal, the mood was normal and not feeling anxious.       Vital Signs Last 24 Hrs  T(C): 36.9 (26 Aug 2020 13:40), Max: 37 (26 Aug 2020 13:21)  T(F): 98.4 (26 Aug 2020 13:40), Max: 98.6 (26 Aug 2020 13:21)  HR: 95 (26 Aug 2020 15:00) (95 - 129)  BP: 125/86 (26 Aug 2020 15:00) (103/65 - 133/98)  BP(mean): 109 (26 Aug 2020 13:40) (109 - 109)  RR: 20 (26 Aug 2020 15:00) (19 - 20)  SpO2: 99% (26 Aug 2020 15:00) (99% - 99%)      Labs:                        15.5   8.44  )-----------( 210      ( 26 Aug 2020 14:03 )             45.2     08-26    136  |  100  |  15  ----------------------------<  97  4.5   |  24  |  1.05    Ca    10.1      26 Aug 2020 14:03  Phos  4.0     08-26  Mg     2.2     08-26    TPro  7.5  /  Alb  4.8  /  TBili  0.3  /  DBili  x   /  AST  16  /  ALT  13  /  AlkPhos  72  08-26    PT/INR - ( 26 Aug 2020 14:03 )   PT: 14.3 sec;   INR: 1.21 ratio         PTT - ( 26 Aug 2020 14:03 )  PTT:37.9 sec        Cardiovascular Diagnostic Testing:  ECG: < from: 12 Lead ECG (07.21.20 @ 09:00) >  SINUS RHYTHM WITH PREMATURE ATRIAL COMPLEXES  OTHERWISE NORMAL ECG  WHEN COMPARED WITH ECG OF 20-JUL-2020 10:38 no longer in atrial flutter.    < end of copied text >      Echo: < from: Transthoracic Echocardiogram (07.21.20 @ 13:45) >  LA:     3.7    2.0 - 4.0 cm  Ao:     2.8    2.0 - 3.8 cm  SEPTUM: 0.8    0.6 - 1.2 cm  PWT:    0.8    0.6 - 1.1 cm  LVIDd:  4.0    3.0 - 5.6 cm  LVIDs:  2.5    1.8 - 4.0 cm  Derived variables:  LVMI: 50 g/m2  RWT: 0.40  EF (Visual Estimate): 70 %  Doppler Peak Velocity (m/sec): TV=2.3  ------------------------------------------------------------------------  Observations:  Mitral Valve: Mitral annular calcification. Mild mitral  regurgitation.  Aortic Valve/Aorta: Normal aortic valve. Minimal aortic  regurgitation.  Normal aortic root size.  Left Atrium: Normal left atrium.  Left Ventricle: Normal left ventricular internal  dimensions. Discrete basal septal hypertrophy.  Normal left ventricular systolic function. No segmental  wall motion abnormalities.  Normal diastolic function.  Right Heart: Normal right atrium. Normal right ventricular  size and function.  Normal tricuspid valve. Mild tricuspid regurgitation.  Normal pulmonic valve.  Pericardium/Pleura: Normal pericardium with no pericardial  effusion.  Hemodynamic: Estimated right atrial pressire is normal.  No evidence of pulmonary hypertension.  No PFO seen with color Doppler.    < end of copied text >    Interpretation of Telemetry: flutter with 2: block ventricular rate 127, then variable block ventricular rate 90 to 100.    Imaging:

## 2020-08-27 DIAGNOSIS — H81.03 MENIERE'S DISEASE, BILATERAL: ICD-10-CM

## 2020-08-27 DIAGNOSIS — E85.89 OTHER AMYLOIDOSIS: ICD-10-CM

## 2020-08-27 LAB
ANION GAP SERPL CALC-SCNC: 12 MMOL/L — SIGNIFICANT CHANGE UP (ref 5–17)
BUN SERPL-MCNC: 13 MG/DL — SIGNIFICANT CHANGE UP (ref 7–23)
CALCIUM SERPL-MCNC: 8.3 MG/DL — LOW (ref 8.4–10.5)
CHLORIDE SERPL-SCNC: 109 MMOL/L — HIGH (ref 96–108)
CO2 SERPL-SCNC: 17 MMOL/L — LOW (ref 22–31)
CREAT SERPL-MCNC: 0.92 MG/DL — SIGNIFICANT CHANGE UP (ref 0.5–1.3)
GLUCOSE SERPL-MCNC: 142 MG/DL — HIGH (ref 70–99)
MAGNESIUM SERPL-MCNC: 1.7 MG/DL — SIGNIFICANT CHANGE UP (ref 1.6–2.6)
POTASSIUM SERPL-MCNC: 3.9 MMOL/L — SIGNIFICANT CHANGE UP (ref 3.5–5.3)
POTASSIUM SERPL-SCNC: 3.9 MMOL/L — SIGNIFICANT CHANGE UP (ref 3.5–5.3)
SARS-COV-2 IGG SERPL QL IA: NEGATIVE — SIGNIFICANT CHANGE UP
SARS-COV-2 IGM SERPL IA-ACNC: <0.1 INDEX — SIGNIFICANT CHANGE UP
SODIUM SERPL-SCNC: 138 MMOL/L — SIGNIFICANT CHANGE UP (ref 135–145)

## 2020-08-27 PROCEDURE — 93656 COMPRE EP EVAL ABLTJ ATR FIB: CPT

## 2020-08-27 PROCEDURE — 93655 ICAR CATH ABLTJ DSCRT ARRHYT: CPT

## 2020-08-27 PROCEDURE — 99233 SBSQ HOSP IP/OBS HIGH 50: CPT

## 2020-08-27 PROCEDURE — 93010 ELECTROCARDIOGRAM REPORT: CPT

## 2020-08-27 PROCEDURE — 93613 INTRACARDIAC EPHYS 3D MAPG: CPT

## 2020-08-27 PROCEDURE — 93662 INTRACARDIAC ECG (ICE): CPT | Mod: 26

## 2020-08-27 PROCEDURE — 93657 TX L/R ATRIAL FIB ADDL: CPT

## 2020-08-27 RX ORDER — PANTOPRAZOLE SODIUM 20 MG/1
40 TABLET, DELAYED RELEASE ORAL ONCE
Refills: 0 | Status: COMPLETED | OUTPATIENT
Start: 2020-08-27 | End: 2020-08-27

## 2020-08-27 RX ORDER — METOPROLOL TARTRATE 50 MG
25 TABLET ORAL DAILY
Refills: 0 | Status: DISCONTINUED | OUTPATIENT
Start: 2020-08-27 | End: 2020-08-28

## 2020-08-27 RX ORDER — METOPROLOL TARTRATE 50 MG
25 TABLET ORAL DAILY
Refills: 0 | Status: DISCONTINUED | OUTPATIENT
Start: 2020-08-27 | End: 2020-08-27

## 2020-08-27 RX ORDER — ALBUMIN HUMAN 25 %
250 VIAL (ML) INTRAVENOUS ONCE
Refills: 0 | Status: DISCONTINUED | OUTPATIENT
Start: 2020-08-27 | End: 2020-08-28

## 2020-08-27 RX ORDER — FUROSEMIDE 40 MG
20 TABLET ORAL ONCE
Refills: 0 | Status: COMPLETED | OUTPATIENT
Start: 2020-08-27 | End: 2020-08-27

## 2020-08-27 RX ORDER — CEFAZOLIN SODIUM 1 G
2000 VIAL (EA) INJECTION ONCE
Refills: 0 | Status: COMPLETED | OUTPATIENT
Start: 2020-08-27 | End: 2020-08-27

## 2020-08-27 RX ORDER — APIXABAN 2.5 MG/1
5 TABLET, FILM COATED ORAL
Refills: 0 | Status: DISCONTINUED | OUTPATIENT
Start: 2020-08-27 | End: 2020-08-28

## 2020-08-27 RX ADMIN — PANTOPRAZOLE SODIUM 40 MILLIGRAM(S): 20 TABLET, DELAYED RELEASE ORAL at 22:34

## 2020-08-27 RX ADMIN — Medication 1 DROP(S): at 05:43

## 2020-08-27 RX ADMIN — SPIRONOLACTONE 25 MILLIGRAM(S): 25 TABLET, FILM COATED ORAL at 05:43

## 2020-08-27 RX ADMIN — Medication 1 DROP(S): at 11:53

## 2020-08-27 RX ADMIN — Medication 20 MILLIGRAM(S): at 22:36

## 2020-08-27 RX ADMIN — NORTRIPTYLINE HYDROCHLORIDE 40 MILLIGRAM(S): 10 CAPSULE ORAL at 22:38

## 2020-08-27 RX ADMIN — Medication 1 DROP(S): at 00:55

## 2020-08-27 RX ADMIN — Medication 100 MILLIGRAM(S): at 22:38

## 2020-08-27 RX ADMIN — APIXABAN 5 MILLIGRAM(S): 2.5 TABLET, FILM COATED ORAL at 23:46

## 2020-08-27 RX ADMIN — Medication 100 MILLIGRAM(S): at 20:40

## 2020-08-27 NOTE — PRE-ANESTHESIA EVALUATION ADULT - NSANTHPMHFT_GEN_ALL_CORE
71yo woman with Meniere's disease, pulmonary amyloidosis, aflutter/afib on eliquis, GERD, sick sinus syndrome

## 2020-08-27 NOTE — CHART NOTE - NSCHARTNOTEFT_GEN_A_CORE
Atrial Tachycardia and Fibrillation Ablation    Last Name: Gary   First Name: Chika  MR# [id number]         : 1950    Date of Procedure: 2020    : Boni Mer[facility]    Procedures performed:    1.	Successful ablation of PVI (99559)    2.	Additional procedures: ICE (42902)    3.	Additional procedures: 3D mapping (14202)    4.	Additional procedures: Additional atrial ablation for AF remaining after PVI (57794)    5.	Additional procedures: Additional ablation of 1st discrete arrhythmia (89728-65)    6.	Additional procedures: Additional ablation of 2nd discrete arrhythmia (66578)        Referring physicians: Ritesh Cummings MD    Preprocedure Diagnosis:     1.	Atrial Tachycardia Type: Early persistent along with ECG’s showing Atrial fibrillation    2.	Symptoms: Dyspnea    3.	Symptoms: Dizziness    4.	Antiarrhythmic drugs failed or intolerance: metoprolol  intolerant    5.	NYHA Class: II    6.	Left Atrial Volume Index: 30    7.	Left atrial diameter: 4.2  cm    8.	LVEF: 55-70%    9.	Underlying heart disease: None    10.	Non cardiac disease: Amyloid in lung    11.	[id number]:  2    12.	Familial AF: No Genetic testing: Not done    13.	Previous Ablation type: None        Post procedure diagnosis:    1.	Severe fibrosis and scarring based on high density voltage map and conduction velocity of entire LA but normal RA    2.	Successful PV isolation of  LSPV, LIPV, RSPV and RIPV and Ablation of additional substrate for persistent AF remaining after PVI with ablation of high posterior wall and roof as well as a left atrial tachycardia from the  anterior LA near Cielo’s bundle without evidence of RA to LA conduction through BB even pre ablation    3.	Ablation of inducible right atrial CTI flutter and two additional left atrial tachycardias: mitral annular flutter and left atrial roof tachycardia     4.	Very prolonged and complex case due to degree of endocardial scarring requiring prolonged ablation at site of final left atrial tachycardia    5.	Normal left and right  atrial filling pressure    3D mapping system: CARTO    Imaging: ICE/EAM    Procedure Time:     270     minutes    Time to Transeptal:      45     minutes      LA Dwell time:    200 minutes    Anesthesia/Sedation: GET with EP Anesthesia     Esophageal Temperature monitoring:  Yes   Maximum temp increase 1 degree C    EBL: 50cc              I/O 2100 in no Loaiza    Complications: None    Fluoroscopy: 0 Minutes       Preprocedure CHACORTA: No     Implanted Device Present: None    Procedural Anticoagulation: 05616 Units IV heparin bolus and 2000 units/hour to keep -400  secs    Last dose OAC: apixaban 5mg  24  Hours prior to procedure    Protamine 50 mg IV was given  At end of procedure    Brief history: 70 year old woman with 2 months of palpitations and weakness due to a left atrial tachycardia based on 12 lead morphology and excessive sinus node slowing at metoprolol doses > 50 mg daily with now drug refractory atrial tachycardia with variable response and rates up to 150 bpm. 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 She is now here for EPS and ablation.    Procedure details:    The patient was brought to the EP lab in the post absorptive nonsedated state after informed consent was obtained then prepped and draped in the usual manner. She was intubated and formal time out was performed. The sheaths were placed as following using Seldinger technique and the patient was anticoagulated.     Sheaths and Catheters:    LFV:  9F  Dubois  long sheath with a   Biosense  Soundstar catheter to the RA    LFV:  8.0F  Dubois  long sheath with a   Biosense  7F decapolar  catheter to the CS    RFV:  8.5F  Abbott  SL1 Sheath with a   Biosense  7F Smart Touch FJ alternating with Pentaray catheter to the LA and RA    Arterial pressure monitoring: Noninvasive    Transeptal needle:  [place] RF      Hemodynamics:     RA pressure initial:  9mm Hg    LA pressure initial:  12mm Hg            RA pressure final:    11mm Hg    LA pressure final:   17mm Hg            Baseline Rhythm: Atrial tachycardia with a rapid response:    The ICE catheter was advanced to the RA and a sound map of the LA, PV’s, mitral annulus, DEVANTE and esophagus was created after respiratory gating was performed. The ablation catheter and the Pentaray were advanced to the RA (respiratory gating was redone if needed) and the catheter force was zeroed. A volumetric map of the SVC, RA, IVC and CS was created with the ablation catheter and used to place the CS catheter. The Pentaray activation map confirmed that the tachycardia was left atrial in origin crossing over the septum.    The ablation catheter was used to tent the fossa ovalis under ICE and the sheath was advanced until the sheath was seen tenting the fossa. The ablation catheter was removed and the dilator with the Transeptal needle 2 cm proximal to the tip was advanced into the sheath as it was held against the fossa. The dilator was then advanced until it tented the fossa. The Transeptal needle was advanced under ICE and pressure guidance until LA access was obtained with RF energy, based on ICE and pressure waveform.    The dilator was slightly advanced and the needle was removed. A 0.032 J wire was advanced through the dilator into either the LSPV or LIPV and the dilator and sheath were advanced into the LA. The dilator and wire were removed and the Pentaray catheter was advanced into the LA for High density mapping during sinus and atrial arrhythmias.     Ablation was performed using 50 W for 7-10 seconds using Ablation Index of 300-350 for the posterior wall and 450-500. This was decreased or increased depending on the thickness of tissue and proximity to the esophagus. If further atrial tachycardias were inducible and sustained, high density voltage and activation mapping as well as pacing maneuvers including concealed entrainment were performed to determine whether the tachycardia was focal or reentrant and to define either the site of origin or critical isthmus.    The initial lesion set delivered:    Tight PVI ?    Wide PVI ?      during rhythm of AT,and later sinus    Details: Although the left atrium was not enlarged there was extensive low voltage and slow conduction throughout (roof, anterior and posterior) during atrial tachycardia. The first tachycardia CL was 270ms. During ablation around the superior aspect of the right veins, the tachycardia changed to a slower AT at 380 ms with a different activation sequence. I proceeded to ablate around the left veins and the posterior roof and the tachycardia changed to what appeared to be mitral annular flutter at 300 ms and confirmed with repeat activation mapping and Coherent mapping. Block was confirmed  from the RSPV, RIPV, LIPV and high posterior wall and roof.        Due to sustained reentrant mitral annular AT    Ablation along the DEVANTE base down to the mitral annular isthmus restored sinus rhythm.         PVI isolation confirmed by:    Pacing from veins and CS ?  ; however, the anterior LSPV was not isolated so I completed this isolation.    Block across linear lesions confirmed by:     Pacing and recording across line ?   High density activation and propagation mapping ?    Pacing on the ablation lines at high output ?            Post ablation, Programmed stimulation was performed    Pacing stimulation included triple extrastimuli at 400 ms in CS which induced CTI flutter which was successfully ablated and terminated to sinus with bidirectional block across the isthmus.    The patient then developed incessant left atrial tachycardia again which appeared to originate from the anterior LA near Cielo’s bundle where there was extensive fibrosis and scarring. CL was 440 ms. I had confirmed during a previous map in sinus that LA activation was only through the septum, and posteriorly. I therefore decided to ablate the anterior LA during which the tachycardia would terminate then restart. While I could previously terminate the tachycardia with CS pacing lianna 280 ms, I could no longer terminate the tachycardia which appeared incessant. I administered 1 mg ibutilide which increased the QTc from 440 to 540 ms, the tachycardia CL prolonged to 490 ms but was still incessant. I had been ablated with 50W for 10 seconds, but decided to ablate this area with 35W for 30-60 seconds which finally terminated the tachycardia without recurrence. The electrograms at this site prior to the initial ablation were extremely fractionated, low amplitude and 105 ms in duration on the Pentaray electrodes.    Following this, I could no longer induce any atrial dysrhythmias despite triple extrastimuli or burst pacing down to 240 ms. Additionally, DEVANTE activation in sinus was 110 ms and occurred prior to the QRS despite low septal activation only and via the posterolateral route. No abnormal V waves in sinus.    Final rhythm:    Sinus  rate of   70 bpm    IN:  189  ms.  QRS:  86 ms  QT: 480   QTc:   520     Intervals:    AH interval:  138 ms; AVNW: 400 ms (sedated)     VA conduction: No       HV interval:  50 ms    Atrial flutter inducible post ablation: No    Other arrhythmias inducible: None    Conclusions: Successful left atrial substrate ablation for atrial fibrillation Left atrial tachycardia X 2 and  right and left atrial flutter ablation in a markedly fibrotic left atrium which is not significantly enlarged. ICE showed no pericardial effusion at the end of the case and normal RV and LV function. The patient should be evaluated for cardiac amyloid with a pyrophosphate scan and CMR.  Recurrent risk of arrhythmia is at least 30%. No temp increase more than 0.5 degrees was recorded during the case.

## 2020-08-27 NOTE — PROGRESS NOTE ADULT - PROBLEM SELECTOR PROBLEM 4
[facility], [place], Endoscopy    [address] [address]    Phone:  [phone]                                       After Visit Summary   3/22/2017    Davida Renyaga    MRN: [id number]           After Visit Summary Signature Page     I have received my discharge instructions, and my questions have been answered. I have discussed any challenges I see with this plan with the nurse or doctor.    ..........................................................................................................................................  Patient/Patient Representative Signature      ..........................................................................................................................................  Patient Representative Print Name and Relationship to Patient    ..................................................               ................................................  Date                                            Time    ..........................................................................................................................................  Reviewed by Signature/Title    ...................................................              ..............................................  Date                                                            Time           Prophylactic measure

## 2020-08-27 NOTE — PROGRESS NOTE ADULT - ATTENDING COMMENTS
Will proceed with ablation today
[facility]  [phone]

## 2020-08-27 NOTE — PROGRESS NOTE ADULT - PROBLEM SELECTOR PLAN 1
Unclear if typical, to be mapped in EP lab and determination of procedure for ablation.  Maintain on apixaban. Must be dosed this morning. Unclear if typical, to be mapped in EP lab and determination of procedure for ablation.  Maintained on apixaban, dose held per EP for procedure.

## 2020-08-27 NOTE — CHART NOTE - NSCHARTNOTEFT_GEN_A_CORE
Very difficult and complex case due to extensive left atrial fibrosis, slow conduction and scarring suggesting infiltrative atrial disease (?amyloid). 5 distinct left  tachycardias mapped and ablated with termination to sinus. CTI right atrial and left atrial mitral annular flutter also ablated. Moderate risk of recurrence (30-40%) but non inducible at end of case. ICE without any pericardial effusion. ECG not diagnostic of amyloid although voltage on the low side. Would recommend a pyrophosphate scan and cardiac MRI prior to discharge.  AH mildly prolonged at 120 but HV normal

## 2020-08-28 ENCOUNTER — TRANSCRIPTION ENCOUNTER (OUTPATIENT)
Age: 70
End: 2020-08-28

## 2020-08-28 VITALS
RESPIRATION RATE: 18 BRPM | DIASTOLIC BLOOD PRESSURE: 77 MMHG | HEART RATE: 80 BPM | SYSTOLIC BLOOD PRESSURE: 115 MMHG | TEMPERATURE: 98 F | OXYGEN SATURATION: 100 %

## 2020-08-28 PROCEDURE — C1769: CPT

## 2020-08-28 PROCEDURE — C1894: CPT

## 2020-08-28 PROCEDURE — 85027 COMPLETE CBC AUTOMATED: CPT

## 2020-08-28 PROCEDURE — A9585: CPT

## 2020-08-28 PROCEDURE — 75561 CARDIAC MRI FOR MORPH W/DYE: CPT | Mod: 26

## 2020-08-28 PROCEDURE — C1730: CPT

## 2020-08-28 PROCEDURE — 84100 ASSAY OF PHOSPHORUS: CPT

## 2020-08-28 PROCEDURE — 85730 THROMBOPLASTIN TIME PARTIAL: CPT

## 2020-08-28 PROCEDURE — 93656 COMPRE EP EVAL ABLTJ ATR FIB: CPT

## 2020-08-28 PROCEDURE — 78830 RP LOCLZJ TUM SPECT W/CT 1: CPT | Mod: 26

## 2020-08-28 PROCEDURE — 99233 SBSQ HOSP IP/OBS HIGH 50: CPT

## 2020-08-28 PROCEDURE — P9045: CPT

## 2020-08-28 PROCEDURE — 86769 SARS-COV-2 COVID-19 ANTIBODY: CPT

## 2020-08-28 PROCEDURE — 84484 ASSAY OF TROPONIN QUANT: CPT

## 2020-08-28 PROCEDURE — 93005 ELECTROCARDIOGRAM TRACING: CPT

## 2020-08-28 PROCEDURE — 93010 ELECTROCARDIOGRAM REPORT: CPT

## 2020-08-28 PROCEDURE — 93662 INTRACARDIAC ECG (ICE): CPT

## 2020-08-28 PROCEDURE — 78830 RP LOCLZJ TUM SPECT W/CT 1: CPT

## 2020-08-28 PROCEDURE — 84443 ASSAY THYROID STIM HORMONE: CPT

## 2020-08-28 PROCEDURE — 80048 BASIC METABOLIC PNL TOTAL CA: CPT

## 2020-08-28 PROCEDURE — 99232 SBSQ HOSP IP/OBS MODERATE 35: CPT

## 2020-08-28 PROCEDURE — 86900 BLOOD TYPING SEROLOGIC ABO: CPT

## 2020-08-28 PROCEDURE — U0003: CPT

## 2020-08-28 PROCEDURE — 86901 BLOOD TYPING SEROLOGIC RH(D): CPT

## 2020-08-28 PROCEDURE — 80053 COMPREHEN METABOLIC PANEL: CPT

## 2020-08-28 PROCEDURE — C1759: CPT

## 2020-08-28 PROCEDURE — C1732: CPT

## 2020-08-28 PROCEDURE — 85610 PROTHROMBIN TIME: CPT

## 2020-08-28 PROCEDURE — 86850 RBC ANTIBODY SCREEN: CPT

## 2020-08-28 PROCEDURE — C1893: CPT

## 2020-08-28 PROCEDURE — 75561 CARDIAC MRI FOR MORPH W/DYE: CPT

## 2020-08-28 PROCEDURE — 93655 ICAR CATH ABLTJ DSCRT ARRHYT: CPT

## 2020-08-28 PROCEDURE — 93657 TX L/R ATRIAL FIB ADDL: CPT

## 2020-08-28 PROCEDURE — 93613 INTRACARDIAC EPHYS 3D MAPG: CPT

## 2020-08-28 PROCEDURE — 83735 ASSAY OF MAGNESIUM: CPT

## 2020-08-28 RX ORDER — METOPROLOL TARTRATE 50 MG
50 TABLET ORAL DAILY
Refills: 0 | Status: DISCONTINUED | OUTPATIENT
Start: 2020-08-29 | End: 2020-08-28

## 2020-08-28 RX ORDER — METOPROLOL TARTRATE 50 MG
1 TABLET ORAL
Qty: 0 | Refills: 0 | DISCHARGE

## 2020-08-28 RX ORDER — MAGNESIUM SULFATE 500 MG/ML
2 VIAL (ML) INJECTION ONCE
Refills: 0 | Status: COMPLETED | OUTPATIENT
Start: 2020-08-28 | End: 2020-08-28

## 2020-08-28 RX ORDER — METOPROLOL TARTRATE 50 MG
1 TABLET ORAL
Qty: 30 | Refills: 0
Start: 2020-08-28 | End: 2020-09-26

## 2020-08-28 RX ORDER — METOPROLOL TARTRATE 50 MG
25 TABLET ORAL ONCE
Refills: 0 | Status: COMPLETED | OUTPATIENT
Start: 2020-08-28 | End: 2020-08-28

## 2020-08-28 RX ADMIN — APIXABAN 5 MILLIGRAM(S): 2.5 TABLET, FILM COATED ORAL at 15:44

## 2020-08-28 RX ADMIN — Medication 25 MILLIGRAM(S): at 06:33

## 2020-08-28 RX ADMIN — Medication 50 GRAM(S): at 08:56

## 2020-08-28 RX ADMIN — Medication 25 MILLIGRAM(S): at 18:14

## 2020-08-28 NOTE — PROGRESS NOTE ADULT - SUBJECTIVE AND OBJECTIVE BOX
24H hour events:   No acute events overnight. Denies chest pain, palpitations, dizziness, lightheadedness, and shortness of breath.     MEDICATIONS:  spironolactone 25 milliGRAM(s) Oral daily  nortriptyline 40 milliGRAM(s) Oral at bedtime  topiramate 100 milliGRAM(s) Oral at bedtime  artificial tears (preservative free) Ophthalmic Solution 1 Drop(s) Both EYES four times a day      REVIEW OF SYSTEMS:  Complete 10point ROS negative.    PHYSICAL EXAM:  T(C): 36.7 (08-27-20 @ 04:35), Max: 37 (08-26-20 @ 13:21)  HR: 86 (08-27-20 @ 04:35) (80 - 129)  BP: 114/72 (08-27-20 @ 04:35) (103/65 - 141/84)  RR: 18 (08-27-20 @ 04:35) (16 - 20)  SpO2: 97% (08-27-20 @ 04:35) (96% - 100%)  Wt(kg): --  I&O's Summary      Appearance: Normal	  Cardiovascular: Normal S1 S2, No JVD, No murmurs, No edema  Respiratory: Lungs clear to auscultation	  Psychiatry: A & O x 3, Mood & affect appropriate  Gastrointestinal:  Soft, Non-tender, + BS	  Skin: No rashes, No ecchymoses, No cyanosis	  Extremities: Normal range of motion, No clubbing, cyanosis or edema  Vascular: Peripheral pulses palpable 2+ bilaterally        LABS:	 	                        15.5   8.44  )-----------( 210      ( 26 Aug 2020 14:03 )             45.2     08-26    136  |  100  |  15  ----------------------------<  97  4.5   |  24  |  1.05    Ca    10.1      26 Aug 2020 14:03  Phos  4.0     08-26  Mg     2.2     08-26    TPro  7.5  /  Alb  4.8  /  TBili  0.3  /  DBili  x   /  AST  16  /  ALT  13  /  AlkPhos  72  08-26    PT/INR - ( 26 Aug 2020 14:03 )   PT: 14.3 sec;   INR: 1.21 ratio         PTT - ( 26 Aug 2020 14:03 )  PTT:37.9 sec        TELEMETRY: AFL 
HPI:  EP procedure accomplished yesterday, well tolerated. Had cardiac MRI and Tc-PYR of heart today. Denies discomfort, no palpitations.    Medications:  albumin human  5% IVPB 250 milliLiter(s) IV Intermittent once  albumin human  5% IVPB 250 milliLiter(s) IV Intermittent once  apixaban 5 milliGRAM(s) Oral two times a day  artificial tears (preservative free) Ophthalmic Solution 1 Drop(s) Both EYES four times a day  metoprolol succinate ER 25 milliGRAM(s) Oral daily  nortriptyline 40 milliGRAM(s) Oral at bedtime  spironolactone 25 milliGRAM(s) Oral daily  topiramate 100 milliGRAM(s) Oral at bedtime    PMH/PSH/FH/SH:  Unchanged    ROS:  CONSTITUTIONAL: no fevers or chills  EYES/ENT: No visual changes;  No vertigo or throat pain   NECK: No pain or stiffness  RESPIRATORY: No cough, wheezing. No shortness of breath  CARDIOVASCULAR: No chest pain, + palpitations  GASTROINTESTINAL: No abdominal or epigastric pain. No nausea, vomiting. No diarrhea. No melena.  GENITOURINARY: No dysuria, frequency or hematuria  NEUROLOGICAL: No numbness or weakness  SKIN: No itching, burning, rashes, or lesions   All other review of systems is negative unless indicated above.     Vitals:  T(C): 36.6 (08-28-20 @ 15:43), Max: 37.1 (08-27-20 @ 21:50)  HR: 79 (08-28-20 @ 15:43) (56 - 95)  BP: 133/68 (08-28-20 @ 15:43) (89/59 - 133/68)  BP(mean): --  RR: 18 (08-28-20 @ 15:43) (11 - 20)  SpO2: 100% (08-28-20 @ 15:43) (92% - 100%)  Wt(kg): --  Daily     Daily     Physical exam:  Constitutional: well developed, normal appearance, well groomed, well nourished, no deformities and no acute distress.   Eyes: the conjunctiva exhibited no abnormalities and the eyelids demonstrated no xanthelasmas.   HEENT: normal oral mucosa, no oral pallor and no oral cyanosis.   Neck: normal jugular venous A waves present, normal jugular venous V waves present and no jugular venous adam A waves.   Pulmonary: no respiratory distress, normal respiratory rhythm and effort, no accessory muscle use and lungs were clear to auscultation bilaterally.   Cardiovascular: The PMI was palpated at the 5th LICS in the midclavicular line. The apical impulse was normal. The heart rate was rapid then slowing and rhythm initially rapid and regular, then normal rate, irregularly irregular. Heart sounds: normal S1, normal S2, no gallop heard, no click. prominent, but physiologic split 2nd sound.   Murmurs: no murmurs heard.   Pulses: Carotid: right 2+, left 2+, no bruit heard over the right carotid and no bruit heard over the left carotid. Posterior tibialis: right 2+ and left 2+. Dorsalis pedis: right 2+ and left 2+.   Abdominal Aorta: the abdominal aorta was not enlarged and no bruit was heard.   Lower Extremities: no pitting edema present.   Abdomen: soft, non-tender, no hepato-splenomegaly and no abdominal mass palpated.   Musculoskeletal: normal gait . the gait was sufficient for exercise testing.   Extremities: no clubbing of the fingernails, no localized cyanosis, no petechial hemorrhages and no ischemic changes.   Skin: normal skin color and pigmentation, no rash, no venous stasis, no skin lesions, no skin ulcer and no xanthoma was observed.   Psychiatric: oriented to person, place, and time, the affect was normal, the mood was normal and not feeling anxious.     I&O's Summary    27 Aug 2020 07:01  -  28 Aug 2020 07:00  --------------------------------------------------------  IN: 50 mL / OUT: 0 mL / NET: 50 mL    28 Aug 2020 07:01  -  28 Aug 2020 16:00  --------------------------------------------------------  IN: 240 mL / OUT: 0 mL / NET: 240 mL          08-27    138  |  109<H>  |  13  ----------------------------<  142<H>  3.9   |  17<L>  |  0.92    Ca    8.3<L>      27 Aug 2020 21:01  Mg     1.7     08-27      ECG: < from: 12 Lead ECG (08.28.20 @ 06:57) >  SINUS RHYTHM WITH PREMATURE SUPRAVENTRICULAR COMPLEXES  LOW VOLTAGE QRS  NONSPECIFIC T WAVE ABNORMALITY    < end of copied text >      Interpretation of Telemetry: sinus rhythm, atrial premature beats.
Has remained in sinus overnight with occasional atrial ectopy. Complains of mild paresthesia dorsum of feet    MEDICATIONS  (STANDING):  albumin human  5% IVPB 250 milliLiter(s) IV Intermittent once  albumin human  5% IVPB 250 milliLiter(s) IV Intermittent once  apixaban 5 milliGRAM(s) Oral two times a day  artificial tears (preservative free) Ophthalmic Solution 1 Drop(s) Both EYES four times a day  metoprolol succinate ER 25 milliGRAM(s) Oral daily  nortriptyline 40 milliGRAM(s) Oral at bedtime  spironolactone 25 milliGRAM(s) Oral daily  topiramate 100 milliGRAM(s) Oral at bedtime    Allergies    No Known Allergies    T(C): 37.1 (08-28-20 @ 04:00), Max: 37.1 (08-27-20 @ 12:55)  HR: 81 (08-28-20 @ 06:37) (56 - 138)  BP: 104/64 (08-28-20 @ 06:37) (89/59 - 128/95)  RR: 17 (08-28-20 @ 06:37) (11 - 20)  SpO2: 92% (08-28-20 @ 06:37) (92% - 100%)    GENERAL: patient appears well, no acute distress, appropriate  EYES: sclera clear, no exudates  NECK: supple, soft, no thyromegaly noted  LUNGS: good air entry bilaterally, clear to auscultation, symmetric breath sounds, no wheezing or rhonchi appreciated  HEART: soft S1/S2, regular rate and rhythm, no murmurs noted, no lower extremity edema, good pulses  GASTROINTESTINAL: abdomen is soft, nontender, nondistended, normoactive bowel sounds, no palpable masses  INTEGUMENT: good skin turgor, no lesions noted, groins without hematoma or bleeding  MUSCULOSKELETAL: no clubbing or cyanosis, no obvious deformity  NEUROLOGIC: awake, alert, oriented x3, good muscle tone in 4 extremities  PSYCHIATRIC: mood is good, affect is congruent, linear and logical thought process  HEME/LYMPH:  no obvious ecchymosis or petechiae       LABS:  cret                        15.5   8.44  )-----------( 210      ( 26 Aug 2020 14:03 )             45.2     08-27    138  |  109<H>  |  13  ----------------------------<  142<H>  3.9   |  17<L>  |  0.92    Ca    8.3<L>      27 Aug 2020 21:01  Phos  4.0     08-26  Mg     1.7     08-27    TPro  7.5  /  Alb  4.8  /  TBili  0.3  /  DBili  x   /  AST  16  /  ALT  13  /  AlkPhos  72  08-26    PT/INR - ( 26 Aug 2020 14:03 )   PT: 14.3 sec;   INR: 1.21 ratio         PTT - ( 26 Aug 2020 14:03 )  PTT:37.9 sec
Patient is a 70y old  Female who presents with a chief complaint of lightheadedness and palpitation (28 Aug 2020 17:30)      INTERVAL HPI/OVERNIGHT EVENTS: noted  pt seen and examined      Vital Signs Last 24 Hrs  T(C): 36.7 (08-27-20 @ 04:35), Max: 37 (08-26-20 @ 13:21)  HR: 86 (08-27-20 @ 04:35) (80 - 129)  BP: 114/72 (08-27-20 @ 04:35) (103/65 - 141/84)  BP(mean): 109 (08-26-20 @ 13:40) (109 - 109)  RR: 18 (08-27-20 @ 04:35) (16 - 20)  SpO2: 97% (08-27-20 @ 04:35) (96% - 100%)    albumin human  5% IVPB 250 milliLiter(s) IV Intermittent once  albumin human  5% IVPB 250 milliLiter(s) IV Intermittent once  apixaban 5 milliGRAM(s) Oral two times a day  artificial tears (preservative free) Ophthalmic Solution 1 Drop(s) Both EYES four times a day  nortriptyline 40 milliGRAM(s) Oral at bedtime  spironolactone 25 milliGRAM(s) Oral daily  topiramate 100 milliGRAM(s) Oral at bedtime      PHYSICAL EXAM:  GENERAL: NAD,   EYES: conjunctiva and sclera clear  ENMT: Moist mucous membranes  NECK: Supple, No JVD, Normal thyroid  CHEST/LUNG: non labored, cta b/l  HEART: Regular rate and rhythm; No murmurs, rubs, or gallops  ABDOMEN: Soft, Nontender, Nondistended; Bowel sounds present  EXTREMITIES:  2+ Peripheral Pulses, No clubbing, cyanosis, or edema  LYMPH: No lymphadenopathy noted  SKIN: No rashes or lesions    Consultant(s) Notes Reviewed:  [x ] YES  [ ] NO  Care Discussed with Consultants/Other Providers [ x] YES  [ ] NO    LABS:     15.5   8.44  )-----------( 210      ( 26 Aug 2020 14:03 )             45.2     08-26    136  |  100  |  15  ----------------------------<  97  4.5   |  24  |  1.05        CAPILLARY BLOOD GLUCOSE                  RADIOLOGY & ADDITIONAL TESTS:    Imaging Personally Reviewed:  [x ] YES  [ ] NO
HPI:  No complaints, denies dyspnea, not really having palpitations, but aware that rhythm is not regular.    Medications:  artificial tears (preservative free) Ophthalmic Solution 1 Drop(s) Both EYES four times a day  nortriptyline 40 milliGRAM(s) Oral at bedtime  spironolactone 25 milliGRAM(s) Oral daily  topiramate 100 milliGRAM(s) Oral at bedtime    PMH/PSH/FH/SH:  Unchanged    ROS:  CONSTITUTIONAL: no fevers or chills  EYES/ENT: No visual changes;  No vertigo or throat pain   NECK: No pain or stiffness  RESPIRATORY: No cough, wheezing. No shortness of breath  CARDIOVASCULAR: No chest pain, + palpitations  GASTROINTESTINAL: No abdominal or epigastric pain. No nausea, vomiting. No diarrhea. No melena.  GENITOURINARY: No dysuria, frequency or hematuria  NEUROLOGICAL: No numbness or weakness  SKIN: No itching, burning, rashes, or lesions   All other review of systems is negative unless indicated above.     Vitals:  T(C): 36.7 (20 @ 04:35), Max: 37 (20 @ 13:21)  HR: 86 (20 @ 04:35) (80 - 129)  BP: 114/72 (20 @ 04:35) (103/65 - 141/84)  BP(mean): 109 (20 @ 13:40) (109 - 109)  RR: 18 (20 @ 04:35) (16 - 20)  SpO2: 97% (20 @ 04:35) (96% - 100%)  Wt(kg): --  Daily Height in cm: 172.72 (26 Aug 2020 13:21)    Daily Weight in k.4 (27 Aug 2020 04:35)    Physical exam:  Constitutional: well developed, normal appearance, well groomed, well nourished, no deformities and no acute distress.   Eyes: the conjunctiva exhibited no abnormalities and the eyelids demonstrated no xanthelasmas.   HEENT: normal oral mucosa, no oral pallor and no oral cyanosis.   Neck: normal jugular venous A waves present, normal jugular venous V waves present and no jugular venous adam A waves.   Pulmonary: no respiratory distress, normal respiratory rhythm and effort, no accessory muscle use and lungs were clear to auscultation bilaterally.   Cardiovascular: The PMI was palpated at the 5th LICS in the midclavicular line. The apical impulse was normal. The heart rate was rapid then slowing and rhythm initially rapid and regular, then normal rate, irregularly irregular. Heart sounds: normal S1, normal S2, no gallop heard, no click. prominent, but physiologic split 2nd sound.   Murmurs: no murmurs heard.   Pulses: Carotid: right 2+, left 2+, no bruit heard over the right carotid and no bruit heard over the left carotid. Posterior tibialis: right 2+ and left 2+. Dorsalis pedis: right 2+ and left 2+.   Abdominal Aorta: the abdominal aorta was not enlarged and no bruit was heard.   Lower Extremities: no pitting edema present.   Abdomen: soft, non-tender, no hepato-splenomegaly and no abdominal mass palpated.   Musculoskeletal: normal gait . the gait was sufficient for exercise testing.   Extremities: no clubbing of the fingernails, no localized cyanosis, no petechial hemorrhages and no ischemic changes.   Skin: normal skin color and pigmentation, no rash, no venous stasis, no skin lesions, no skin ulcer and no xanthoma was observed.   Psychiatric: oriented to person, place, and time, the affect was normal, the mood was normal and not feeling anxious.     I&O's Summary                            15.5   8.44  )-----------( 210      ( 26 Aug 2020 14:03 )             45.2     08-26    136  |  100  |  15  ----------------------------<  97  4.5   |  24  |  1.05    Ca    10.1      26 Aug 2020 14:03  Phos  4.0     08-  Mg     2.2     08-    TPro  7.5  /  Alb  4.8  /  TBili  0.3  /  DBili  x   /  AST  16  /  ALT  13  /  AlkPhos  72  08-26    PT/INR - ( 26 Aug 2020 14:03 )   PT: 14.3 sec;   INR: 1.21 ratio         PTT - ( 26 Aug 2020 14:03 )  PTT:37.9 sec    ECG: atrial flutter    Imaging: < from: Xray Chest 1 View AP/PA (20 @ 11:36) >  The mediastinal cardiac silhouette is unremarkable.    The lungs are clear.     No acute osseous finding.     < end of copied text >      Interpretation of Telemetry: atrial flutter, up to rates of 127, but predominantly 90.

## 2020-08-28 NOTE — CHART NOTE - NSCHARTNOTEFT_GEN_A_CORE
EP Chart Note    70 year old female with past medical history of vertigo secondary to vestibular headaches, as per patients cardiologist with history of manifestations of sick sinus syndrome, profound bradycardia on significant beta blocker dose with first episode of atrial flutter last month (on Eliquis since July 20) which degenerated to atrial fibrillation, and converted on her own to sinus. Presented again today to her cardiologists office (Dr. Cummings) with complaints of dizziness and palpitations since noon yesterday. Review of EKG in ED consistent with atrial tachycardia.    1. 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 Day1 s/p ablation and remains in sinus  3. 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    -s/p AT ablation with ELIANA Meraz 8/27  -CMRI reveals no late gadolinium enhancement   -NM scan negative for transthyretin cardiac amyloidosis  -Continue Metorprolol and Apixaban  -Patient to f/u with ELIANA Meraz 9/9/20 @ 2025  -Clear for discharge from EP perspective    [id number] EP Chart Note    70 year old female with past medical history of vertigo secondary to vestibular headaches, as per patients cardiologist with history of manifestations of sick sinus syndrome, profound bradycardia on significant beta blocker dose with first episode of atrial flutter last month (on Eliquis since July 20) which degenerated to atrial fibrillation, and converted on her own to sinus. Presented again today to her cardiologists office (Dr. Cummings) with complaints of dizziness and palpitations since noon yesterday. Review of EKG in ED consistent with atrial tachycardia.    1. 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 Day1 s/p ablation and remains in sinus  3. 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    -s/p AT ablation with ELIANA Meraz 8/27  -CMRI reveals no late gadolinium enhancement   -NM scan negative for transthyretin cardiac amyloidosis  -Continue Metoprolol and Apixaban  -Patient to f/u with ELIANA Meraz 9/9/20 @ 8315  -Clear for discharge from EP perspective    [id number] EP Chart Note    70 year old female with past medical history of vertigo secondary to vestibular headaches, as per patients cardiologist with history of manifestations of sick sinus syndrome, profound bradycardia on significant beta blocker dose with first episode of atrial flutter last month (on Eliquis since July 20) which degenerated to atrial fibrillation, and converted on her own to sinus. Presented again today to her cardiologists office (Dr. Cummings) with complaints of dizziness and palpitations since noon yesterday. Review of EKG in ED consistent with atrial tachycardia.    1. 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 Day1 s/p ablation and remains in sinus  3. 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

## 2020-08-28 NOTE — DISCHARGE NOTE PROVIDER - NSDCMRMEDTOKEN_GEN_ALL_CORE_FT
apixaban 5 mg oral tablet: 1 tab(s) orally every 12 hours  Artificial Tears ophthalmic solution: 1 drop(s) in each eye 4 times a day, As Needed  betahistine 48mg Capsule: 1 cap(s) orally 2 times a day    NOTE: MADE AT A COMPOUNDING PHARMACY   Citracal + D:   CoQ10:   metoprolol succinate 50 mg oral tablet, extended release: 1 tab(s) orally once a day  nortriptyline 10 mg oral capsule: 4 cap(s) orally once a day (at bedtime)  spironolactone 25 mg oral tablet: 1 tab(s) orally once a day  topiramate 100 mg oral tablet: 1 tab(s) orally once a day (at bedtime)  Vitamin B12:

## 2020-08-28 NOTE — DISCHARGE NOTE PROVIDER - NSDCCPCAREPLAN_GEN_ALL_CORE_FT
PRINCIPAL DISCHARGE DIAGNOSIS  Diagnosis: Atrial flutter  Assessment and Plan of Treatment: You were evaluated by EP and cardiology  s/p ablation on 8/27  History of isolated pulmonary amyloid-patient   s/p cardiac MRI- reveals no late gadolinium enhancement   s/p NM scan- negative for transthyretin cardiac amyloidosis  On tele sinus rhytom with PACs - Toprol increased to 50 mg daily  Continue Apixaban as directed  Please follow up with EP MD Meraz 9/9/20 @ 9661  please follow up with your primray care physican in one week   Please follow up with your cardiologist in one to two weeks      SECONDARY DISCHARGE DIAGNOSES  Diagnosis: Other amyloidosis  Assessment and Plan of Treatment: s/p cardiac MRI- reveals no late gadolinium enhancement   s/p NM scan- negative for transthyretin cardiac amyloidosis    Diagnosis: Meniere's disease of both ears  Assessment and Plan of Treatment: Continue nortriptyline   continue topiramate and  spironolactone as directed

## 2020-08-28 NOTE — PROGRESS NOTE ADULT - REASON FOR ADMISSION
lightheadedness and palpitation

## 2020-08-28 NOTE — PROGRESS NOTE ADULT - PROBLEM SELECTOR PLAN 3
Thoroughly evaluated elsewhere and concluded limited to lung, but now re-evaluating heart.
f/u as outpatient
Thoroughly evaluated elsewhere and concluded limited to lung.

## 2020-08-28 NOTE — DISCHARGE NOTE PROVIDER - CARE PROVIDERS DIRECT ADDRESSES
,DirectAddress_Unknown,DirectAddress_Unknown ,DirectAddress_Unknown,DirectAddress_Unknown,[email or web address]

## 2020-08-28 NOTE — PROGRESS NOTE ADULT - PROBLEM SELECTOR PLAN 1
Mapped in EP lab, not typical flutter. Ablation accomplished.  Degree of atrial scar suggests consideration for amyloid of atrium.  Tc-PYR negative for cardiac uptake. Cardiac MRI done.  Discharge to home and outpatient follow up.  Maintained on apixaban, dose held per EP for procedure.

## 2020-08-28 NOTE — PROGRESS NOTE ADULT - ASSESSMENT
1. 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 Day1 s/p ablation and remains in sinus  3. 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 Continue low dose metoprolol and 5 mg bid apixaban. Replete Mg to > 2.0 with 2gm IV Mg Sulphate.
69 year old woman with manifestations of sick sinus syndrome, profound bradycardia on significant beta blocker dose and first episode of atrial flutter last month which degenerated to atrial fibrillation, then to sinus. Presents again with atrial flutter. Evaluated by Electrophysiology atrial mapping and ablation accomplished yesterday, but not a simple flutter ablation. Extensive atrial scarring demonstrated with resultant circuitous pathways. Maintained on anticoagulation with apixaban since July 20. Continuing all other home medications.
69 yo female with PMH of meniere's disease, pulmonary amyloidosis, Aflutter/Afib on eliquis, GERD, sick sinus syndrome (tendency for bradycardia), presents here with dizziness and palpitation.
70 year old female with past medical history of vertigo secondary to vestibular headaches, as per patients cardiologist with history of manifestations of sick sinus syndrome, profound bradycardia on significant beta blocker dose with first episode of atrial flutter last month (on Eliquis since July 20) which degenerated to atrial fibrillation, and converted on her own to sinus. Presented again today to her cardiologists office (Dr. Cummings) with complaints of dizziness and palpitations since noon yesterday. Review of EKG in ED consistent with accelerated atrial arrythmia.     1.Atrial Tachyarrhythmia     -NPO for cardiac ablation today  -Eliquis and Lopressor on  hold      [id number]
69 year old woman with manifestations of sick sinus syndrome, profound bradycardia on significant beta blocker dose and first episode of atrial flutter last month which degenerated to atrial fibrillation, then to sinus. Presents again with atrial flutter. Evaluated by Electrophysiology for possible flutter ablation and procedure planned today. Maintained on anticoagulation with apixaban since July 20. Continuing all other home medications.

## 2020-08-28 NOTE — PROGRESS NOTE ADULT - PROBLEM SELECTOR PLAN 2
Continue nortriptyline (though may be discontinued soon) and continue topiramate which has been most effective along with spironolactone.
c/w home aldactone, nortriptyline, topamax
Continue nortriptyline (though may be discontinued soon) and continue topiramate which has been most effective along with spironolactone.

## 2020-08-28 NOTE — PROGRESS NOTE ADULT - NSHPATTENDINGPLANDISCUSS_GEN_ALL_CORE
Electrophysiology. To reach Cardiology Attending call [phone].
Electrophysiology. To reach Cardiology Attending call [phone].

## 2020-08-28 NOTE — DISCHARGE NOTE PROVIDER - PROVIDER TOKENS
PROVIDER:[TOKEN:[[id number]]],PROVIDER:[TOKEN:[[id number]],FOLLOWUP:[1 week]] PROVIDER:[TOKEN:[[id number]]],PROVIDER:[TOKEN:[[id number]],FOLLOWUP:[1 week]],PROVIDER:[TOKEN:[[id number]]]

## 2020-08-28 NOTE — DISCHARGE NOTE NURSING/CASE MANAGEMENT/SOCIAL WORK - PATIENT PORTAL LINK FT
You can access the FollowMyHealth Patient Portal offered by [facility] by registering at the following website: [email or web address]. By joining [facility]’s FollowMyHealth portal, you will also be able to view your health information using other applications (apps) compatible with our system.

## 2020-08-28 NOTE — DISCHARGE NOTE PROVIDER - CARE PROVIDER_API CALL
Noni Meraz (MD; PhD)  Cardiac Electrophysiology; Cardiovascular Disease; Internal Medicine  [facility]  Dept of Cardiology, [address] [place] [address]  Phone: [phone]  Fax: [phone]  Follow Up Time:     Brayan Roach  INTERNAL MEDICINE  [address] [place] [address], [address] [address]  Phone: [phone]  Fax: [phone]  Follow Up Time: 1 week Noni Meraz (MD; PhD)  Cardiac Electrophysiology; Cardiovascular Disease; Internal Medicine  [facility]  Dept of Cardiology, [address] [place] [address]  Phone: [phone]  Fax: [phone]  Follow Up Time:     Brayan Roach  INTERNAL MEDICINE  [address] [facility], [facility] [address]  [place] [address]  Phone: [phone]  Fax: [phone]  Follow Up Time: 1 week    Ritesh Cummings  CARDIOVASCULAR DISEASE  [address] [facility] [address]  [place] [address]  Phone: [phone]  Fax: [phone]  Follow Up Time:

## 2020-08-28 NOTE — DISCHARGE NOTE PROVIDER - HOSPITAL COURSE
70 year old female with past medical history of vertigo secondary to vestibular headaches, as per patients cardiologist with history of manifestations of sick sinus syndrome, profound bradycardia on significant beta blocker dose with first episode of atrial flutter last month (on Eliquis since July 20) which degenerated to atrial fibrillation, and converted on her own to sinus. Presented again today to her cardiologists office (Dr. Cummings) with complaints of dizziness and palpitations since noon yesterday. Review of EKG in ED consistent with atrial tachycardia.        1. 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 Day1 s/p ablation and remains in sinus    3. 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        -s/p AT ablation with EP MD Meraz 8/27    -CMRI reveals no late gadolinium enhancement     -NM scan negative for transthyretin cardiac amyloidosis    -Tele: SR w/PAC    -Increase Toprol to 50mg daily and give an additional 25mg prior to discharge    -Apixaban    -Patient to f/u with EP MD Meraz 9/9/20 @ 8435    -Clear for discharge from EP perspective 70 year old female with past medical history of vertigo secondary to vestibular headaches, as per patients cardiologist with history of manifestations of sick sinus syndrome, profound bradycardia on significant beta blocker dose with first episode of atrial flutter last month (on Eliquis since July 20) which degenerated to atrial fibrillation, and converted on her own to sinus. Presented again today to her cardiologists office (Dr. Cummings) with complaints of dizziness and palpitations since noon yesterday. 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 pt to follow up with Patient to f/u with ELIANA Meraz 9/9/20 @ 4605    Cleared for discharge by EP and cardiology    Med rec and DCp discussed with Dr. Greene who cleared pt for discharge

## 2020-08-31 PROBLEM — I48.91 UNSPECIFIED ATRIAL FIBRILLATION: Chronic | Status: ACTIVE | Noted: 2020-08-26

## 2020-09-04 ENCOUNTER — NON-APPOINTMENT (OUTPATIENT)
Age: 70
End: 2020-09-04

## 2020-09-04 ENCOUNTER — APPOINTMENT (OUTPATIENT)
Dept: CARDIOLOGY | Facility: CLINIC | Age: 70
End: 2020-09-04
Payer: MEDICARE

## 2020-09-04 VITALS
TEMPERATURE: 97.6 F | OXYGEN SATURATION: 100 % | HEIGHT: 68 IN | HEART RATE: 61 BPM | BODY MASS INDEX: 24.71 KG/M2 | DIASTOLIC BLOOD PRESSURE: 78 MMHG | SYSTOLIC BLOOD PRESSURE: 118 MMHG | WEIGHT: 163 LBS

## 2020-09-04 VITALS
BODY MASS INDEX: 24.71 KG/M2 | TEMPERATURE: 97.6 F | HEIGHT: 68 IN | SYSTOLIC BLOOD PRESSURE: 118 MMHG | WEIGHT: 163 LBS | DIASTOLIC BLOOD PRESSURE: 78 MMHG | OXYGEN SATURATION: 100 % | HEART RATE: 61 BPM

## 2020-09-04 PROCEDURE — 99215 OFFICE O/P EST HI 40 MIN: CPT

## 2020-09-04 PROCEDURE — 93000 ELECTROCARDIOGRAM COMPLETE: CPT

## 2020-09-04 RX ORDER — SPIRONOLACTONE 25 MG/1
25 TABLET ORAL DAILY
Qty: 30 | Refills: 0 | Status: DISCONTINUED | COMMUNITY
Start: 2019-10-17 | End: 2020-09-04

## 2020-09-04 NOTE — HISTORY OF PRESENT ILLNESS
[FreeTextEntry1] : Mrs. Chika Draper is a 69-year-old woman evaluated in the past for mild sick sinus syndrome with tendency to bradycardia and felt to have no significant cardiac disease. After a busy day at work onset of sense that her vision was "swimming" and she tolerated for several hours, but when it persisted the following morning went to the emergency room. She had a complete neurological evaluation and MRI/MRA and was concluded not to have any central nervous system abnormality. She was observed for bradycardia in the CDU and had an echocardiogram that was entirely normal. She was discharged and advised to followup with cardiologist.\par \par Remained well for a time, then sudden episode of profound weakness. Could barely stand up, unable to get to car. Called ambulance and went to [facility] ER. Blood pressure elevated in range of 200/100, but declined without specific treatment. CT head, MRI, complete blood tests all normal and then cardiac echo normal as well including normal bubble study. Had.renal sonogram and urine for metanephrines and saw Dr. oRach in office. Has had further neurology evaluation and presumed diagnosis of Meniere's and had monthly severe vertiginous spells. Neurologist proposed starting acetazolamide, then increased . 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  She is no longer on Diamox and is now on spironolactone, but episodes of vertigo and poor balance persist.  There have been no palpitations and no syncope.\par \par Month or two ago woke early in morning dizzy, EMS found hypotensive in 60s, in ER blood pressure satisfactory, but tachycardia interpreted as sinus tach, however probably atrial tachycardia at 118 and on further review have concluded it was atypical atrial flutter with 2:1 block. After few hours broke to sinus and then on further monitoring had runs of atrial fibrillation and started on anticoagulation with apixaban. Was aware of SVT, but not of runs of atrial fibrillation and remained with no palpitations after hospital discharge. Then on August 26 prolonged palpitations, came to office and sent to ER. Sustained atrial flutter and had ablation which demonstrated unexpected high degree of atrial scarring. Sinus rhythm restored and prior to discharge had cardiac MRI as well as Tc-PYR scan which revealed no cardiac uptake.

## 2020-09-04 NOTE — REVIEW OF SYSTEMS
[Seeing Double (Diplopia)] : diplopia [Dizziness] : dizziness [Palpitations] : palpitations [Negative] : Heme/Lymph [Shortness Of Breath] : no shortness of breath [Dyspnea on exertion] : not dyspnea during exertion [Chest Pain] : no chest pain [Lower Ext Edema] : no extremity edema [Leg Claudication] : no intermittent leg claudication

## 2020-09-04 NOTE — PHYSICAL EXAM
[Normal Appearance] : normal appearance [General Appearance - Well Developed] : well developed [Well Groomed] : well groomed [General Appearance - In No Acute Distress] : no acute distress [No Deformities] : no deformities [General Appearance - Well Nourished] : well nourished [Eyelids - No Xanthelasma] : the eyelids demonstrated no xanthelasmas [Normal Oral Mucosa] : normal oral mucosa [Normal Conjunctiva] : the conjunctiva exhibited no abnormalities [No Oral Pallor] : no oral pallor [No Oral Cyanosis] : no oral cyanosis [Normal Jugular Venous V Waves Present] : normal jugular venous V waves present [Normal Jugular Venous A Waves Present] : normal jugular venous A waves present [No Jugular Venous Turner A Waves] : no jugular venous turner A waves [Exaggerated Use Of Accessory Muscles For Inspiration] : no accessory muscle use [Respiration, Rhythm And Depth] : normal respiratory rhythm and effort [5th Left ICS - MCL] : palpated at the 5th LICS in the midclavicular line [Auscultation Breath Sounds / Voice Sounds] : lungs were clear to auscultation bilaterally [Normal] : normal [Premature Beats] : regular with premature beats [Normal Rate] : normal [Normal S1] : normal S1 [No Gallop] : no gallop heard [Normal S2] : normal S2 [No Murmur] : no murmurs heard [2+] : right 2+ [No Abnormalities] : the abdominal aorta was not enlarged and no bruit was heard [No Pitting Edema] : no pitting edema present [Abdomen Soft] : soft [Abdomen Tenderness] : non-tender [Abnormal Walk] : normal gait [Gait - Sufficient For Exercise Testing] : the gait was sufficient for exercise testing [Abdomen Mass (___ Cm)] : no abdominal mass palpated [Petechial Hemorrhages (___cm)] : no petechial hemorrhages [Nail Clubbing] : no clubbing of the fingernails [Cyanosis, Localized] : no localized cyanosis [Skin Color & Pigmentation] : normal skin color and pigmentation [No Venous Stasis] : no venous stasis [] : no rash [Skin Lesions] : no skin lesions [No Xanthoma] : no  xanthoma was observed [Oriented To Time, Place, And Person] : oriented to person, place, and time [No Skin Ulcers] : no skin ulcer [Mood] : the mood was normal [Affect] : the affect was normal [No Anxiety] : not feeling anxious [Click] : no click [Right Carotid Bruit] : no bruit heard over the right carotid [Left Carotid Bruit] : no bruit heard over the left carotid

## 2020-09-04 NOTE — DISCUSSION/SUMMARY
[Improving] : improving [Rhythm Control] : rhythm control [Paroxysmal Atrial Fibrillation] : paroxysmal atrial fibrillation [Anticoagulation] : anticoagulation [Hypertension] : hypertension [None] : none [Urine Metanephrine] : urine metanephrine [Stable] : stable [Patient] : the patient [Medication Changes Per Orders] : as documented in orders [de-identified] : maintain apixaban as started in hospital and low dose beta blocker  [de-identified] : atrial flutter, atrial tachycardia [de-identified] : had ablation [de-identified] : Renal sonogram, normal size kidneys, normal renal artery velocities, cysts

## 2020-09-04 NOTE — REASON FOR VISIT
[Follow-Up - Clinic] : a clinic follow-up of [Atrial Fibrillation] : atrial fibrillation [Supraventricular Tachycardia] : supraventricular tachycardia [Sick Sinus Syndrome] : sick sinus syndrome [Discharge Date: ___] : Discharge Date: [unfilled]

## 2020-09-09 ENCOUNTER — NON-APPOINTMENT (OUTPATIENT)
Age: 70
End: 2020-09-09

## 2020-09-09 ENCOUNTER — APPOINTMENT (OUTPATIENT)
Dept: ELECTROPHYSIOLOGY | Facility: CLINIC | Age: 70
End: 2020-09-09
Payer: MEDICARE

## 2020-09-09 VITALS
WEIGHT: 165 LBS | SYSTOLIC BLOOD PRESSURE: 131 MMHG | DIASTOLIC BLOOD PRESSURE: 78 MMHG | RESPIRATION RATE: 15 BRPM | OXYGEN SATURATION: 97 % | HEIGHT: 68 IN | BODY MASS INDEX: 25.01 KG/M2 | HEART RATE: 84 BPM

## 2020-09-09 PROCEDURE — 93000 ELECTROCARDIOGRAM COMPLETE: CPT

## 2020-09-09 PROCEDURE — 99213 OFFICE O/P EST LOW 20 MIN: CPT

## 2020-09-09 RX ORDER — TOPIRAMATE 25 MG/1
25 TABLET, COATED ORAL AT BEDTIME
Refills: 0 | Status: DISCONTINUED | COMMUNITY
Start: 2019-06-18 | End: 2020-09-09

## 2020-09-09 RX ORDER — TOPIRAMATE 100 MG/1
100 TABLET, FILM COATED ORAL
Refills: 0 | Status: ACTIVE | COMMUNITY
Start: 2020-08-25

## 2020-09-09 RX ORDER — METOPROLOL SUCCINATE 25 MG/1
25 TABLET, EXTENDED RELEASE ORAL
Qty: 90 | Refills: 0 | Status: DISCONTINUED | COMMUNITY
Start: 2020-07-23

## 2020-09-24 NOTE — DISCUSSION/SUMMARY
[FreeTextEntry1] : Doing very well after complex ablation\par May consider decreasing metoprolol back to 25mg\par PACs/PVCs mostly at rest, likely suppressed with activity

## 2020-09-24 NOTE — REVIEW OF SYSTEMS
[Fever] : no fever [Headache] : no headache [Chills] : no chills [Feeling Fatigued] : not feeling fatigued [Blurry Vision] : no blurred vision [Shortness Of Breath] : no shortness of breath [Dyspnea on exertion] : not dyspnea during exertion [Chest  Pressure] : no chest pressure [Chest Pain] : no chest pain [Lower Ext Edema] : no extremity edema [Palpitations] : no palpitations [Wheezing] : no wheezing [Coughing Up Blood] : no hemoptysis [Abdominal Pain] : no abdominal pain [Nausea] : no nausea [Joint Pain] : no joint pain [Joint Swelling] : no joint swelling [Muscle Cramps] : no muscle cramps [Skin: A Rash] : no rash: [Dizziness] : no dizziness [Tremor] : no tremor was seen [Confusion] : no confusion was observed [Anxiety] : no anxiety [Excessive Thirst] : no polydipsia [Easy Bleeding] : no tendency for easy bleeding [Easy Bruising] : no tendency for easy bruising

## 2020-09-24 NOTE — PHYSICAL EXAM
[General Appearance - Well Developed] : well developed [Normal Appearance] : normal appearance [General Appearance - Well Nourished] : well nourished [Normal Conjunctiva] : the conjunctiva exhibited no abnormalities [Eyelids - No Xanthelasma] : the eyelids demonstrated no xanthelasmas [Normal Jugular Venous A Waves Present] : normal jugular venous A waves present [Normal Jugular Venous V Waves Present] : normal jugular venous V waves present [] : no respiratory distress [Auscultation Breath Sounds / Voice Sounds] : lungs were clear to auscultation bilaterally [Lungs Percussion] : the lungs were normal to percussion [Heart Sounds] : normal S1 and S2 [Murmurs] : no murmurs present [Arterial Pulses Normal] : the arterial pulses were normal [Edema] : no peripheral edema present [Bowel Sounds] : normal bowel sounds [Abdomen Tenderness] : non-tender [Abnormal Walk] : normal gait [Nail Clubbing] : no clubbing of the fingernails [Cyanosis, Localized] : no localized cyanosis [Skin Color & Pigmentation] : normal skin color and pigmentation [No Venous Stasis] : no venous stasis [Skin Lesions] : no skin lesions [Oriented To Time, Place, And Person] : oriented to person, place, and time [Affect] : the affect was normal [Mood] : the mood was normal [FreeTextEntry1] : NCAT

## 2020-09-24 NOTE — HISTORY OF PRESENT ILLNESS
[FreeTextEntry1] : 70 year old woman with a past medical history of symptomatic left atrial tachycardia refractory to beta blocker therapy, now s/p successful left atrial substrate ablation for atrial fibrillation, left atrial tachycardia, and right and left atrial flutter ablation on 8/27/20.  An unexpectedly markedly fibrotic left atrium was noted during the ablation, and a follow up cardiac MRI was normal  and Tc-PYR scan revealed no cardiac uptake.  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 \par EKG today with APCs and PVCs\par \par Currently on metprolol and apixaban

## 2020-10-07 ENCOUNTER — APPOINTMENT (OUTPATIENT)
Dept: ELECTROPHYSIOLOGY | Facility: CLINIC | Age: 70
End: 2020-10-07
Payer: MEDICARE

## 2020-10-07 ENCOUNTER — NON-APPOINTMENT (OUTPATIENT)
Age: 70
End: 2020-10-07

## 2020-10-07 VITALS
BODY MASS INDEX: 25.01 KG/M2 | OXYGEN SATURATION: 98 % | WEIGHT: 165 LBS | HEIGHT: 68 IN | SYSTOLIC BLOOD PRESSURE: 144 MMHG | HEART RATE: 71 BPM | DIASTOLIC BLOOD PRESSURE: 83 MMHG

## 2020-10-07 PROCEDURE — 93000 ELECTROCARDIOGRAM COMPLETE: CPT

## 2020-10-07 PROCEDURE — 99213 OFFICE O/P EST LOW 20 MIN: CPT

## 2020-10-07 NOTE — PHYSICAL EXAM
[General Appearance - Well Developed] : well developed [Normal Appearance] : normal appearance [General Appearance - Well Nourished] : well nourished [Auscultation Breath Sounds / Voice Sounds] : lungs were clear to auscultation bilaterally [Lungs Percussion] : the lungs were normal to percussion [Heart Sounds] : normal S1 and S2 [Murmurs] : no murmurs present [Arterial Pulses Normal] : the arterial pulses were normal [Edema] : no peripheral edema present [Veins - Varicosity Changes] : no varicosital changes were noted in the lower extremities [Cyanosis, Localized] : no localized cyanosis [] : no ischemic changes

## 2020-10-20 ENCOUNTER — APPOINTMENT (OUTPATIENT)
Dept: ELECTROPHYSIOLOGY | Facility: CLINIC | Age: 70
End: 2020-10-20
Payer: MEDICARE

## 2020-10-26 NOTE — DISCUSSION/SUMMARY
[FreeTextEntry1] : Doing well post ablation on low dose metoprolol only. Added losartan for hypertension. Follow up in 3 months.

## 2020-10-26 NOTE — HISTORY OF PRESENT ILLNESS
[FreeTextEntry1] : Doing very well. Rare palpitations that are brief and sound like APD's. No prolonged palpitations. BP is starting to increase and is > 140 at times.

## 2020-11-05 ENCOUNTER — FORM ENCOUNTER (OUTPATIENT)
Age: 70
End: 2020-11-05

## 2020-11-16 PROCEDURE — 0298T: CPT

## 2020-11-16 PROCEDURE — 0296T: CPT

## 2020-12-07 ENCOUNTER — APPOINTMENT (OUTPATIENT)
Dept: CARDIOLOGY | Facility: CLINIC | Age: 70
End: 2020-12-07

## 2020-12-09 ENCOUNTER — NON-APPOINTMENT (OUTPATIENT)
Age: 70
End: 2020-12-09

## 2020-12-09 ENCOUNTER — APPOINTMENT (OUTPATIENT)
Dept: ELECTROPHYSIOLOGY | Facility: CLINIC | Age: 70
End: 2020-12-09
Payer: MEDICARE

## 2020-12-09 VITALS
HEIGHT: 68 IN | DIASTOLIC BLOOD PRESSURE: 82 MMHG | BODY MASS INDEX: 25.76 KG/M2 | HEART RATE: 60 BPM | SYSTOLIC BLOOD PRESSURE: 155 MMHG | OXYGEN SATURATION: 97 % | WEIGHT: 170 LBS

## 2020-12-09 PROCEDURE — 99214 OFFICE O/P EST MOD 30 MIN: CPT

## 2020-12-09 PROCEDURE — 93000 ELECTROCARDIOGRAM COMPLETE: CPT

## 2020-12-09 RX ORDER — DOXYCYCLINE HYCLATE 100 MG/1
100 CAPSULE ORAL
Qty: 20 | Refills: 0 | Status: COMPLETED | COMMUNITY
Start: 2020-10-03

## 2020-12-09 RX ORDER — ECONAZOLE NITRATE 10 MG/G
1 CREAM TOPICAL
Qty: 60 | Refills: 0 | Status: COMPLETED | COMMUNITY
Start: 2020-10-05

## 2020-12-09 RX ORDER — METOPROLOL SUCCINATE 50 MG/1
50 TABLET, EXTENDED RELEASE ORAL
Qty: 90 | Refills: 3 | Status: DISCONTINUED | COMMUNITY
Start: 2020-07-23 | End: 2020-12-09

## 2020-12-09 NOTE — REVIEW OF SYSTEMS
[Palpitations] : palpitations [Fever] : no fever [Headache] : no headache [Chills] : no chills [Feeling Fatigued] : not feeling fatigued [Blurry Vision] : no blurred vision [Shortness Of Breath] : no shortness of breath [Dyspnea on exertion] : not dyspnea during exertion [Chest  Pressure] : no chest pressure [Chest Pain] : no chest pain [Lower Ext Edema] : no extremity edema [Wheezing] : no wheezing [Coughing Up Blood] : no hemoptysis [Abdominal Pain] : no abdominal pain [Nausea] : no nausea [Joint Pain] : no joint pain [Joint Swelling] : no joint swelling [Muscle Cramps] : no muscle cramps [Skin: A Rash] : no rash: [Dizziness] : no dizziness [Tremor] : no tremor was seen [Confusion] : no confusion was observed [Anxiety] : no anxiety [Excessive Thirst] : no polydipsia [Easy Bleeding] : no tendency for easy bleeding [Easy Bruising] : no tendency for easy bruising

## 2020-12-09 NOTE — HISTORY OF PRESENT ILLNESS
[FreeTextEntry1] : Had some palpitation one month ago that showed salvos of atrial tachycardia that have subsided. She feels well.

## 2020-12-09 NOTE — PHYSICAL EXAM
[General Appearance - Well Developed] : well developed [Normal Appearance] : normal appearance [General Appearance - Well Nourished] : well nourished [Normal Conjunctiva] : the conjunctiva exhibited no abnormalities [Eyelids - No Xanthelasma] : the eyelids demonstrated no xanthelasmas [Normal Jugular Venous A Waves Present] : normal jugular venous A waves present [Normal Jugular Venous V Waves Present] : normal jugular venous V waves present [Auscultation Breath Sounds / Voice Sounds] : lungs were clear to auscultation bilaterally [Lungs Percussion] : the lungs were normal to percussion [Heart Sounds] : normal S1 and S2 [Murmurs] : no murmurs present [Arterial Pulses Normal] : the arterial pulses were normal [Edema] : no peripheral edema present [Veins - Varicosity Changes] : no varicosital changes were noted in the lower extremities [Bowel Sounds] : normal bowel sounds [Abdomen Tenderness] : non-tender [Abnormal Walk] : normal gait [Cyanosis, Localized] : no localized cyanosis [] : no ischemic changes [Skin Color & Pigmentation] : normal skin color and pigmentation [No Venous Stasis] : no venous stasis [Skin Lesions] : no skin lesions [Oriented To Time, Place, And Person] : oriented to person, place, and time [Affect] : the affect was normal [Mood] : the mood was normal [FreeTextEntry1] : NCAT

## 2020-12-09 NOTE — DISCUSSION/SUMMARY
[FreeTextEntry1] : Overall much improved. Continue current regimen, Will have her follow up in 3 months

## 2020-12-09 NOTE — REASON FOR VISIT
[Follow-Up - Clinic] : a clinic follow-up of [FreeTextEntry2] : s/p complex ablation in very disease LA/RA

## 2020-12-14 ENCOUNTER — OUTPATIENT (OUTPATIENT)
Dept: OUTPATIENT SERVICES | Facility: HOSPITAL | Age: 70
LOS: 1 days | End: 2020-12-14
Payer: MEDICARE

## 2020-12-14 ENCOUNTER — APPOINTMENT (OUTPATIENT)
Dept: RADIOLOGY | Facility: IMAGING CENTER | Age: 70
End: 2020-12-14
Payer: MEDICARE

## 2020-12-14 DIAGNOSIS — Z90.89 ACQUIRED ABSENCE OF OTHER ORGANS: Chronic | ICD-10-CM

## 2020-12-14 DIAGNOSIS — Z00.8 ENCOUNTER FOR OTHER GENERAL EXAMINATION: ICD-10-CM

## 2020-12-14 PROCEDURE — 77080 DXA BONE DENSITY AXIAL: CPT

## 2020-12-14 PROCEDURE — 77080 DXA BONE DENSITY AXIAL: CPT | Mod: 26

## 2021-03-10 ENCOUNTER — NON-APPOINTMENT (OUTPATIENT)
Age: 71
End: 2021-03-10

## 2021-03-10 ENCOUNTER — APPOINTMENT (OUTPATIENT)
Dept: ELECTROPHYSIOLOGY | Facility: CLINIC | Age: 71
End: 2021-03-10
Payer: MEDICARE

## 2021-03-10 VITALS
HEART RATE: 55 BPM | WEIGHT: 167 LBS | BODY MASS INDEX: 25.31 KG/M2 | OXYGEN SATURATION: 98 % | HEIGHT: 68 IN | RESPIRATION RATE: 14 BRPM | SYSTOLIC BLOOD PRESSURE: 139 MMHG | DIASTOLIC BLOOD PRESSURE: 87 MMHG

## 2021-03-10 DIAGNOSIS — I48.92 UNSPECIFIED ATRIAL FLUTTER: ICD-10-CM

## 2021-03-10 DIAGNOSIS — I48.19 OTHER PERSISTENT ATRIAL FIBRILLATION: ICD-10-CM

## 2021-03-10 PROCEDURE — 93000 ELECTROCARDIOGRAM COMPLETE: CPT

## 2021-03-10 PROCEDURE — 99213 OFFICE O/P EST LOW 20 MIN: CPT

## 2021-03-10 RX ORDER — UBIDECARENONE 100 MG
100 CAPSULE ORAL
Refills: 0 | Status: ACTIVE | COMMUNITY

## 2021-03-10 RX ORDER — RIBOFLAVIN (VITAMIN B2) 50 MG
TABLET ORAL
Refills: 0 | Status: ACTIVE | COMMUNITY

## 2021-03-22 PROBLEM — I48.92 ATRIAL FLUTTER: Status: ACTIVE | Noted: 2020-08-26

## 2021-03-22 PROBLEM — I48.19 ATRIAL FIBRILLATION, PERSISTENT: Status: ACTIVE | Noted: 2020-09-24

## 2021-03-22 NOTE — PHYSICAL EXAM
[General Appearance - Well Developed] : well developed [Normal Appearance] : normal appearance [General Appearance - Well Nourished] : well nourished [Normal Conjunctiva] : the conjunctiva exhibited no abnormalities [Eyelids - No Xanthelasma] : the eyelids demonstrated no xanthelasmas [FreeTextEntry1] : NCAT [Normal Jugular Venous A Waves Present] : normal jugular venous A waves present [Normal Jugular Venous V Waves Present] : normal jugular venous V waves present [Auscultation Breath Sounds / Voice Sounds] : lungs were clear to auscultation bilaterally [Lungs Percussion] : the lungs were normal to percussion [Heart Rate And Rhythm] : heart rate and rhythm were normal [Heart Sounds] : normal S1 and S2 [Murmurs] : no murmurs present [Arterial Pulses Normal] : the arterial pulses were normal [Edema] : no peripheral edema present [Veins - Varicosity Changes] : no varicosital changes were noted in the lower extremities [Bowel Sounds] : normal bowel sounds [Abdomen Tenderness] : non-tender [Abnormal Walk] : normal gait [Cyanosis, Localized] : no localized cyanosis [] : no ischemic changes [Skin Color & Pigmentation] : normal skin color and pigmentation [No Venous Stasis] : no venous stasis [Skin Lesions] : no skin lesions [Oriented To Time, Place, And Person] : oriented to person, place, and time [Affect] : the affect was normal [Mood] : the mood was normal

## 2021-03-22 NOTE — HISTORY OF PRESENT ILLNESS
[FreeTextEntry1] : Mrs. Draper continues to do well. No significant palpations despite severely fibrotic LA. No dyspnea or palpitations nor syncope.

## 2021-05-05 ENCOUNTER — TRANSCRIPTION ENCOUNTER (OUTPATIENT)
Age: 71
End: 2021-05-05

## 2021-05-06 ENCOUNTER — TRANSCRIPTION ENCOUNTER (OUTPATIENT)
Age: 71
End: 2021-05-06

## 2021-05-10 ENCOUNTER — TRANSCRIPTION ENCOUNTER (OUTPATIENT)
Age: 71
End: 2021-05-10

## 2021-05-24 ENCOUNTER — TRANSCRIPTION ENCOUNTER (OUTPATIENT)
Age: 71
End: 2021-05-24

## 2021-05-25 ENCOUNTER — APPOINTMENT (OUTPATIENT)
Dept: ELECTROPHYSIOLOGY | Facility: CLINIC | Age: 71
End: 2021-05-25
Payer: MEDICARE

## 2021-05-26 ENCOUNTER — FORM ENCOUNTER (OUTPATIENT)
Age: 71
End: 2021-05-26

## 2021-05-26 ENCOUNTER — TRANSCRIPTION ENCOUNTER (OUTPATIENT)
Age: 71
End: 2021-05-26

## 2021-06-07 ENCOUNTER — APPOINTMENT (OUTPATIENT)
Dept: CARDIOLOGY | Facility: CLINIC | Age: 71
End: 2021-06-07

## 2021-06-10 ENCOUNTER — TRANSCRIPTION ENCOUNTER (OUTPATIENT)
Age: 71
End: 2021-06-10

## 2021-06-13 ENCOUNTER — TRANSCRIPTION ENCOUNTER (OUTPATIENT)
Age: 71
End: 2021-06-13

## 2021-06-14 ENCOUNTER — TRANSCRIPTION ENCOUNTER (OUTPATIENT)
Age: 71
End: 2021-06-14

## 2021-06-15 ENCOUNTER — TRANSCRIPTION ENCOUNTER (OUTPATIENT)
Age: 71
End: 2021-06-15

## 2021-06-18 ENCOUNTER — TRANSCRIPTION ENCOUNTER (OUTPATIENT)
Age: 71
End: 2021-06-18

## 2021-06-29 ENCOUNTER — TRANSCRIPTION ENCOUNTER (OUTPATIENT)
Age: 71
End: 2021-06-29

## 2021-06-30 ENCOUNTER — TRANSCRIPTION ENCOUNTER (OUTPATIENT)
Age: 71
End: 2021-06-30

## 2021-06-30 PROCEDURE — 93228 REMOTE 30 DAY ECG REV/REPORT: CPT

## 2021-07-07 ENCOUNTER — APPOINTMENT (OUTPATIENT)
Dept: ELECTROPHYSIOLOGY | Facility: CLINIC | Age: 71
End: 2021-07-07
Payer: MEDICARE

## 2021-07-07 VITALS
BODY MASS INDEX: 25.76 KG/M2 | HEART RATE: 58 BPM | DIASTOLIC BLOOD PRESSURE: 84 MMHG | SYSTOLIC BLOOD PRESSURE: 142 MMHG | OXYGEN SATURATION: 98 % | HEIGHT: 68 IN | WEIGHT: 170 LBS

## 2021-07-07 PROCEDURE — 99213 OFFICE O/P EST LOW 20 MIN: CPT

## 2021-07-07 PROCEDURE — 93000 ELECTROCARDIOGRAM COMPLETE: CPT

## 2021-07-07 RX ORDER — VITAMIN K2 90 MCG
125 MCG CAPSULE ORAL
Refills: 0 | Status: ACTIVE | COMMUNITY

## 2021-07-07 RX ORDER — METOPROLOL SUCCINATE 50 MG/1
50 TABLET, EXTENDED RELEASE ORAL
Qty: 90 | Refills: 3 | Status: DISCONTINUED | COMMUNITY
Start: 2020-10-07 | End: 2021-07-07

## 2021-07-08 ENCOUNTER — NON-APPOINTMENT (OUTPATIENT)
Age: 71
End: 2021-07-08

## 2021-07-08 NOTE — DISCUSSION/SUMMARY
[FreeTextEntry1] : I will further reduce metoprolol succinate to 25 mg daily and assess her symptoms. If she continues to have symptoms, I have advised a repeat ablation. ECG today shows sinus bradycardia. 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

## 2021-07-08 NOTE — HISTORY OF PRESENT ILLNESS
[FreeTextEntry1] : Mrs. Draper returns today for follow up of multiple atrial tachycardias in the setting of a very fibrosed and enlarge LA with large areas of very poor voltage and conduction. She had initially done well for several months post ablation last year but has had paroxysms of breakthroughs lasting minutes to hours. Recently I placed her on low dose flecainide which appears to have suppressed the remaining atrial tach/fib. Beta-blockers were decreased to minimize sinus bradycardia.She is feeling better with very little in the way of palpitations and her heart rate is increasing to the 50's in sinus after I reduced her beta-blocker

## 2021-07-12 ENCOUNTER — TRANSCRIPTION ENCOUNTER (OUTPATIENT)
Age: 71
End: 2021-07-12

## 2021-07-21 ENCOUNTER — TRANSCRIPTION ENCOUNTER (OUTPATIENT)
Age: 71
End: 2021-07-21

## 2021-08-11 ENCOUNTER — RX RENEWAL (OUTPATIENT)
Age: 71
End: 2021-08-11

## 2021-09-23 ENCOUNTER — TRANSCRIPTION ENCOUNTER (OUTPATIENT)
Age: 71
End: 2021-09-23

## 2021-10-27 ENCOUNTER — NON-APPOINTMENT (OUTPATIENT)
Age: 71
End: 2021-10-27

## 2021-10-27 ENCOUNTER — APPOINTMENT (OUTPATIENT)
Dept: ELECTROPHYSIOLOGY | Facility: CLINIC | Age: 71
End: 2021-10-27
Payer: MEDICARE

## 2021-10-27 VITALS
BODY MASS INDEX: 25.76 KG/M2 | HEIGHT: 68 IN | HEART RATE: 51 BPM | WEIGHT: 170 LBS | SYSTOLIC BLOOD PRESSURE: 164 MMHG | DIASTOLIC BLOOD PRESSURE: 84 MMHG | OXYGEN SATURATION: 98 %

## 2021-10-27 PROCEDURE — 93000 ELECTROCARDIOGRAM COMPLETE: CPT

## 2021-10-27 PROCEDURE — 99214 OFFICE O/P EST MOD 30 MIN: CPT

## 2021-11-02 NOTE — DISCUSSION/SUMMARY
[FreeTextEntry1] : I have asked her to reduce flecainide to 25 in am and 50 at night and take her metoprolol at bedtime to see if her sinus rates during the day improve and she has more energy. She does not want a pacemaker and we are trying to avoid a repeat ablation at this time. ECG today shows sinus bradycardia at 50 bpm

## 2021-11-02 NOTE — HISTORY OF PRESENT ILLNESS
[FreeTextEntry1] : Mrs. Draper has had no recurrences of SVT on low dose flecainide. She had an ablation for AF and AT and had extensive scarring of her LA but there is no evidence of amyloid by Tc PYP. She also has some degree of sick sinus which limits use of flecainide and metoprolol. She has fatigue due to sick sinus but does not want a pacemaker.

## 2021-11-02 NOTE — REASON FOR VISIT
[Arrhythmia/ECG Abnorrmalities] : arrhythmia/ECG abnormalities [FreeTextEntry3] : Ritesh Cummings MD

## 2021-11-22 ENCOUNTER — APPOINTMENT (OUTPATIENT)
Dept: OTOLARYNGOLOGY | Facility: CLINIC | Age: 71
End: 2021-11-22
Payer: MEDICARE

## 2021-11-22 VITALS
TEMPERATURE: 97.3 F | SYSTOLIC BLOOD PRESSURE: 131 MMHG | DIASTOLIC BLOOD PRESSURE: 77 MMHG | HEIGHT: 68 IN | WEIGHT: 170 LBS | HEART RATE: 70 BPM | BODY MASS INDEX: 25.76 KG/M2

## 2021-11-22 DIAGNOSIS — R42 DIZZINESS AND GIDDINESS: ICD-10-CM

## 2021-11-22 DIAGNOSIS — H93.19 TINNITUS, UNSPECIFIED EAR: ICD-10-CM

## 2021-11-22 PROCEDURE — 99213 OFFICE O/P EST LOW 20 MIN: CPT

## 2021-11-22 PROCEDURE — 92557 COMPREHENSIVE HEARING TEST: CPT

## 2021-11-22 PROCEDURE — 92567 TYMPANOMETRY: CPT

## 2021-11-22 NOTE — END OF VISIT
[FreeTextEntry3] : I personally saw and examined DEREK MULLIGAN in detail. I spoke to TERELL Pedraza regarding the assessment and plan of care. I reviewed the above assessment and plan of care, and agree. I have made changes in changes in the body of the note where appropriate.I personally reviewed the HPI, PMH, FH, SH, ROS and medications/allergies. I have spoken to TERELL Pedraza regarding the history and have personally determined the assessment and plan of care, and documented this myself. I was present and participated in all key portions of the encounter and all procedures noted above. I have made changes in the body of the note where appropriate.\par \par Attesting Faculty: See Attending Signature Below \par \par \par  [Time Spent: ___ minutes] : I have spent [unfilled] minutes of time on the encounter.

## 2021-11-22 NOTE — PHYSICAL EXAM
[Fukuda Step Test] : Fukuda Step Test was Positive [Nystagmus] : ~T no ~M nystagmus was seen [de-identified] : abn\par Step test sway to left [de-identified] : sway to the left  [Midline] : trachea located in midline position [Normal] : no rashes

## 2021-11-22 NOTE — DATA REVIEWED
[de-identified] : Hearing Test performed to evaluate the extent of hearing loss and  to explain pt's symptoms\par today's hearing test was personally reviewed and revealed\par Type A tymps in each ear. Hearing is WNL in the right ear and a severe to profound SNHL in the left ear. Stable re previous audio 4/2019.

## 2021-11-22 NOTE — ASSESSMENT
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 Had a vertigo episode last month, today she is feeling okay \par \par Left Menieres Disease:\par -continue care with  \par consider vestib rehab\par \par SNHL:\par -Hearing Test performed to evaluate the extent of hearing loss and to explain pt's symptoms\par bilateral sensorineural hearing loss-cleared for hearing aids\par \par \par \par f/u prn

## 2021-11-22 NOTE — HISTORY OF PRESENT ILLNESS
[No] : patient does not have a  history of radiation therapy [de-identified] : 72 yo female\par Patient with hx of Left Menieres Disease presents for follow up. States hearing decreased in left ear since last visit. She is being followed by  for dizziness. Had a bad vertigo episode last month. Today she is feeling okay. Pt has no ear pain, ear drainage, nasal congestion, nasal discharge, epistaxis, sinus infections, facial pain, facial pressure, throat pain, dysphagia or fevers\par Now present for hearing test eval\par \par  [Tinnitus] : tinnitus [Dizziness] : dizziness [Hearing Loss] : hearing loss [Meniere Disease] : Meniere disease [Early Onset Hearing Loss] : no early onset hearing loss [Stroke] : no stroke [Allergic Rhinitis] : no allergic rhinitis [Adenoidectomy] : no adenoidectomy [Allergies] : no allergies [Asthma] : no asthma [Hyperthyroidism] : no hyperthyroidism [Sialadenitis] : no sialadenitis [Hodgkin Disease] : no hodgkin disease [Non-Hodgkin Lymphoma] : no non-hodgkin lymphoma [None] : No risk factors have been identified. [Graves Disease] : no graves disease [Thyroid Cancer] : no thyroid cancer

## 2021-12-09 ENCOUNTER — TRANSCRIPTION ENCOUNTER (OUTPATIENT)
Age: 71
End: 2021-12-09

## 2021-12-27 ENCOUNTER — TRANSCRIPTION ENCOUNTER (OUTPATIENT)
Age: 71
End: 2021-12-27

## 2022-01-13 NOTE — PATIENT PROFILE ADULT - BILL PAYMENT
Spoke with patient.  Per Dr. Sandy, a CTA could be done, but because of her previous aneurysm coilings, this might not be very helpful due to artifact.  He feels that it would be better to wait until she has her tissue expanders exchanged for the breast implants, and then do an MRA.  The patient stated that she will probably be have the expanders removed in about 6 months.  She will call us once she is scheduled for the procedure, and then we can get her rescheduled for the MRA and an office appointment.   Her recall entry has been updated.    no

## 2022-02-22 ENCOUNTER — RX RENEWAL (OUTPATIENT)
Age: 72
End: 2022-02-22

## 2022-02-24 ENCOUNTER — RX RENEWAL (OUTPATIENT)
Age: 72
End: 2022-02-24

## 2022-03-23 ENCOUNTER — NON-APPOINTMENT (OUTPATIENT)
Age: 72
End: 2022-03-23

## 2022-03-23 ENCOUNTER — APPOINTMENT (OUTPATIENT)
Dept: ELECTROPHYSIOLOGY | Facility: CLINIC | Age: 72
End: 2022-03-23
Payer: MEDICARE

## 2022-03-23 VITALS
BODY MASS INDEX: 25.76 KG/M2 | SYSTOLIC BLOOD PRESSURE: 151 MMHG | DIASTOLIC BLOOD PRESSURE: 84 MMHG | WEIGHT: 170 LBS | HEART RATE: 58 BPM | OXYGEN SATURATION: 99 % | HEIGHT: 68 IN

## 2022-03-23 DIAGNOSIS — I49.5 SICK SINUS SYNDROME: ICD-10-CM

## 2022-03-23 PROCEDURE — 93000 ELECTROCARDIOGRAM COMPLETE: CPT

## 2022-03-23 PROCEDURE — 99215 OFFICE O/P EST HI 40 MIN: CPT

## 2022-03-23 NOTE — DISCUSSION/SUMMARY
[FreeTextEntry1] : She is doing reasonably well without recurrent arrhythmia but has mild to moderate chronotropic incompetence but would prefer not to have a pacemaker. She asked me about thrombotic risks with Evista due to severe osteoporosis. I told her that since she is on apixaban chronically, the risk is minimal. She did not have evidence of amyloid in her atrium by CMR despite the significant scarring seen on HD mapping. ECG shows sinus bradycardia at 57 bpm. She should probably have repeat TSH and CBC and CMP which will likely be done in [place] in the near future

## 2022-03-23 NOTE — HISTORY OF PRESENT ILLNESS
[FreeTextEntry1] : Mrs. Draper has had no recurrences of SVT on low dose flecainide. She had an ablation for AF and AT and had extensive scarring of her LA but there is no evidence of amyloid by Tc PYP. She also has some degree of sick sinus which limits use of flecainide and metoprolol. She has fatigue due to sick sinus but does not want a pacemaker.  Her main issue is mildly reduced exercise capacity from chronotropic incompetence. 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 She is due to have a full evaluation of this soon in [place]. I have encouraged her to make a follow up with Dr. Cummings as well.

## 2022-04-27 NOTE — ED PROVIDER NOTE - QRS
92 cardiology f/u  off ac due to GI bleed  c/w metoprolol  obtain dig level prior to dosing Bilateral Helical Rim Advancement Flap Text: The defect edges were debeveled with a #15 blade scalpel.  Given the location of the defect and the proximity to free margins (helical rim) a bilateral helical rim advancement flap was deemed most appropriate.  Using a sterile surgical marker, the appropriate advancement flaps were drawn incorporating the defect and placing the expected incisions between the helical rim and antihelix where possible.  The area thus outlined was incised through and through with a #15 scalpel blade.  With a skin hook and iris scissors, the flaps were gently and sharply undermined and freed up.

## 2022-06-14 ENCOUNTER — TRANSCRIPTION ENCOUNTER (OUTPATIENT)
Age: 72
End: 2022-06-14

## 2022-07-07 NOTE — CONSULT NOTE ADULT - I HAVE PERSONALLY SEEN, EXAMINED, AND PARTICIPATED IN THE CARE OF THIS PATIENT.  I HAVE REVIEWED ALL PERTINENT CLINICAL INFORMATION, INCLUDING HISTORY, PHYSICAL EXAM, PLAN AND THE MEDICAL/PA/NP STUDENT’S NOTE AND AGREE EXCEPT AS NOTED.
Patient seen for one time evaluation and treatment.  Patient did not return for further treatment and current status is unknown.  Please see initial evaluation for further information.     Statement Selected

## 2022-07-29 ENCOUNTER — NON-APPOINTMENT (OUTPATIENT)
Age: 72
End: 2022-07-29

## 2022-07-29 ENCOUNTER — APPOINTMENT (OUTPATIENT)
Dept: ELECTROPHYSIOLOGY | Facility: CLINIC | Age: 72
End: 2022-07-29

## 2022-07-29 VITALS — SYSTOLIC BLOOD PRESSURE: 152 MMHG | DIASTOLIC BLOOD PRESSURE: 82 MMHG | HEART RATE: 55 BPM | OXYGEN SATURATION: 98 %

## 2022-07-29 PROCEDURE — 99215 OFFICE O/P EST HI 40 MIN: CPT

## 2022-07-29 PROCEDURE — 93000 ELECTROCARDIOGRAM COMPLETE: CPT

## 2022-07-29 NOTE — DISCUSSION/SUMMARY
[FreeTextEntry1] : ECG shows sinus at 56 bpm, low voltage limb leads. Echo in 4/22 showed normal LV systolic function and thickness with LAE. I have asked her to decrease flecainide to 25 mg bid and see if heart rate improves. If PAT returns, she should resume the current dose of 50 at night and 25 in am. She remains on low dose metoprolol succinate as well. We also discussed possible leadless atrial pacing as an option in the future as well [EKG obtained to assist in diagnosis and management of assessed problem(s)] : EKG obtained to assist in diagnosis and management of assessed problem(s)

## 2022-07-29 NOTE — HISTORY OF PRESENT ILLNESS
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 She continues on low dose flecainide and metoprolol but does have mild chronotropic incompetence and does not achieve sinus rates much above 70 or 80 with activity and associated with mild dyspnea. No presyncope or dizziness. No recent palpitations (she experiences during AT breakthrough). She does not want a permanent pacemaker thus far nor does she want to undergo a repeat ablation. There is no pressing need for either.

## 2022-08-23 ENCOUNTER — APPOINTMENT (OUTPATIENT)
Dept: ORTHOPEDIC SURGERY | Facility: CLINIC | Age: 72
End: 2022-08-23

## 2022-08-23 VITALS — BODY MASS INDEX: 25.46 KG/M2 | WEIGHT: 168 LBS | HEIGHT: 68 IN

## 2022-08-23 DIAGNOSIS — S76.311A STRAIN OF MUSCLE, FASCIA AND TENDON OF THE POSTERIOR MUSCLE GROUP AT THIGH LEVEL, RIGHT THIGH, INITIAL ENCOUNTER: ICD-10-CM

## 2022-08-23 PROCEDURE — 99203 OFFICE O/P NEW LOW 30 MIN: CPT

## 2022-08-23 PROCEDURE — 73502 X-RAY EXAM HIP UNI 2-3 VIEWS: CPT | Mod: 26,RT

## 2022-08-23 NOTE — HISTORY OF PRESENT ILLNESS
[de-identified] : DEREK MULLIGAN is a 72 year female presenting to the office complaining of right hamstring pain. She  presents to the office ambulating independently. He is accompanied to the office by her  who is contributing to her  medical history . Patient reports pain began on Sunday 8/21/2022.  She notes she was getting out of the car when she slipped and twisted the leg feeling a pull in the back of the thigh.   The patient describes the pain as a dull aching, and occasionally sharp pain localized to the hamstring that is intermittent in nature. Her   symptoms are exacerbated with any movement of the leg.   Pain is alleviated with rest. Patient denies numbness or tingling in the lower extremities. \par Patient is taking Tylenol for pain relief with mild to moderate relief in symptoms. PMHx Afib, and on Eliquis.

## 2022-08-23 NOTE — DISCUSSION/SUMMARY
[de-identified] : The underlying pathophysiology was reviewed in great detail with the patient as well as the various treatment options, including ice, analgesics, NSAIDs, Physical therapy, steroid injections.\par \par Recommended use of OTC Voltaren gel. Recommend consulting with cardiologist prior to utilizing medication. \par \par Activity modifications and restrictions were discussed. \par \par A prescription for Physical Therapy was provided.\par \par A home exercise sheet was given and discussed with the patient to follow.\par \par FU 4-6 weeks. \par \par All questions were answered, all alternatives discussed and the patient is in complete agreement with that plan. Follow-up appointment as instructed. Any issues and the patient will call or come in sooner.\par

## 2022-08-23 NOTE — PHYSICAL EXAM
[de-identified] : Right Lower Extremity\par o Hip :\par ¦ Inspection/Palpation : marked proximal hamstring insertion tenderness, diffuse biceps femoris muscle belly tenderness to palpation, mild diffuse swelling, no deformity\par ¦ Range of Motion : limited and painful in all planes, no crepitus\par ¦ Stability : joint stability intact\par ¦ Strength : hip flexion : unable to assess due to pain. \par ¦ Tests and Signs : all tests for stability normal\par o Muscle Tone : tone normal\par o Muscle Bulk : normal muscle bulk present\par o Skin : no erythema, no ecchymosis\par o Sensation : sensation to light touch intact\par o Vascular Exam : no edema, no cyanosis, dorsalis pedis artery pulse 2+, posterior tibial artery pulse 2+\par \par Left Lower Extremity\par o Hip :\par ¦ Inspection/Palpation : no tenderness, no swelling, no deformity\par ¦ Range of Motion : full and painless in all planes, no crepitus\par ¦ Stability : joint stability intact\par ¦ Strength : hip flexion 5/5\par ¦ Tests and Signs : all tests for stability normal\par o Muscle Tone : tone normal\par o Muscle Bulk : normal muscle bulk present\par o Skin : no erythema, no ecchymosis\par o Sensation : sensation to light touch intact\par o Vascular Exam : no edema, no cyanosis, dorsalis pedis artery pulse 2+, posterior tibial artery pulse 2+\par  [de-identified] : o Right Hip and pelvis : AP and lateral views were obtained, there are no soft tissue abnormalities, no fractures, alignment is normal, normal bone density, normal appearing joint spaces, minimal degenerative changes, no bony lesions.\par \par

## 2022-09-07 ENCOUNTER — TRANSCRIPTION ENCOUNTER (OUTPATIENT)
Age: 72
End: 2022-09-07

## 2022-09-20 ENCOUNTER — TRANSCRIPTION ENCOUNTER (OUTPATIENT)
Age: 72
End: 2022-09-20

## 2022-10-07 NOTE — ED ADULT NURSE NOTE - NS ED NURSE DC INFO COMPLEXITY
Encounter Date: 10/6/2022       History     Chief Complaint   Patient presents with    Contractions     HPI  Michela No is a 27 y.o. [id number] at 38w6d presents complaining of contractions. Patient reports that's the contractions started yesterday and have increased in frequency since then. Patient cannot recall how often apart these contractions are occurring.   This IUP is complicated by late PNC, HSV (denies active lesions, not on meds), PCOS, complicated psych hx (boderline personality disorder, mood disorder, depression/anxiety, ADHD, seizures vs. Pseudoseizures, self-harming behavior, substance abuse), cerebral palsy, headaches, allergies.    Patient reports contractions, denies vaginal bleeding, denies LOF.   Fetal Movement: normal.     Review of patient's allergies indicates:   Allergen Reactions    Fish containing products Swelling     Past Medical History:   Diagnosis Date    ADHD (attention deficit hyperactivity disorder)     Allergy     Anxiety     Borderline personality     Cerebral palsy with spastic/ataxic diplegia     Depression     Genital herpes 2/15/2015    Headache(784.0)     Hemiparesis     since infancy due to shaking.    Herpes simplex virus (HSV) infection     Mood disorder 2013    PCOS (polycystic ovarian syndrome)     as per pt    Personality disorder 2012    Pseudoseizures 2013    Seizures 2010    Self-harming behavior     Substance abuse      Past Surgical History:   Procedure Laterality Date    FOOT TENDON SURGERY       Family History   Adopted: Yes   Problem Relation Age of Onset    No Known Problems Mother     No Known Problems Father     Glaucoma Neg Hx      Social History     Tobacco Use    Smoking status: Former     Packs/day: 0.50     Years: 6.00     Pack years: 3.00     Types: Vaping w/o nicotine, Cigarettes    Smokeless tobacco: Never    Tobacco comments:     marijuana and cigarettes   Substance Use Topics    Alcohol use: Not Currently     Comment: heavily     Drug use: Yes     Types: Methamphetamines, Marijuana     Comment: 4 months     Review of Systems    Physical Exam     Initial Vitals   BP Pulse Resp Temp SpO2   10/06/22 2141 10/06/22 2141 10/06/22 2141 10/06/22 2136 10/06/22 2141   132/79 80 18 98.2 °F (36.8 °C) 100 %      MAP       --                Physical Exam    Constitutional: She appears well-developed and well-nourished. No distress.   HENT:   Head: Normocephalic and atraumatic.   Eyes: EOM are normal.   Cardiovascular:  Normal rate.           Pulmonary/Chest: She has no wheezes.   Abdominal: Abdomen is soft. There is no abdominal tenderness.   Genitourinary:    Genitourinary Comments: Cervical check  2200: 2/50/-3  0000: 2/50/-3     Musculoskeletal:         General: Normal range of motion.     Neurological: She is alert and oriented to person, place, and time.   Skin: Skin is warm, dry and intact. No rash noted.   Psychiatric: She has a normal mood and affect. Her speech is normal and behavior is normal.     OB LABOR EXAM:   Pre-Term Labor: No.   Membranes ruptured: No.   Method: Sterile vaginal exam per MD.   Vaginal Bleeding: none present.   Engagement: ballotable/floating.   Dilatation: 2.   Station: -3.   Effacement: 50%.   Amniotic Fluid Color: no fluid.   Amniotic Fluid Amount: none noted.   Comments: Cervical check unchanged in 2 hours      ED Course   Fetal non-stress test    Date/Time: 10/6/2022 10:45 PM  Performed by: Priscilla Woody MD  Authorized by: Priscilla Woody MD     Nonstress Test:     Variability:  6-25 BPM    Decelerations:  Variable    Accelerations:  15 bpm    Acoustic Stimulator: No      Baseline:  135    Uterine Irritability: No      Contractions:  Irregular    Contraction Frequency:  Q7-8  Biophysical Profile:     Nonstress Test Interpretation: reactive      Overall Impression:  Reassuring  Post-procedure:     Patient tolerance:  Patient tolerated the procedure well with no immediate complications  Labs Reviewed - No data to  display       Imaging Results    None          Medications - No data to display  Medical Decision Making:   ED Management:  27 y.o.  at 38w6d here for labor rule out  - increasing frequency of contractions at home   - q6-7 minutes on toco, however not painful   - Cervical check 2200: 250/-3  - Cervical check 0000: 250/-3  - Since cervical check unchanged, pt deemed stable for discharge home  - labor precautions discussed  Other:   I have discussed this case with another health care provider.       <> Summary of the Discussion: Dr. Suzanna Slade          Attending Attestation:   Physician Attestation Statement for Resident:  As the supervising MD   Physician Attestation Statement: I have personally seen and examined this patient.   I agree with the above history.  -:   As the supervising MD I agree with the above PE.     As the supervising MD I agree with the above treatment, course, plan, and disposition.   I was personally present during the critical portions of the procedure(s) performed by the resident and was immediately available in the ED to provide services and assistance as needed during the entire procedure.                             Clinical Impression:   Final diagnoses:  [O47.9] Irregular contractions (Primary)  [Z3A.38] 38 weeks gestation of pregnancy             Suzanna Slade MD  10/11/22 1965     Simple: Patient demonstrates quick and easy understanding

## 2022-10-20 ENCOUNTER — NON-APPOINTMENT (OUTPATIENT)
Age: 72
End: 2022-10-20

## 2022-10-20 ENCOUNTER — APPOINTMENT (OUTPATIENT)
Dept: ELECTROPHYSIOLOGY | Facility: CLINIC | Age: 72
End: 2022-10-20

## 2022-10-20 VITALS
HEIGHT: 68 IN | RESPIRATION RATE: 14 BRPM | HEART RATE: 55 BPM | DIASTOLIC BLOOD PRESSURE: 82 MMHG | SYSTOLIC BLOOD PRESSURE: 146 MMHG | BODY MASS INDEX: 25.76 KG/M2 | WEIGHT: 170 LBS | OXYGEN SATURATION: 98 %

## 2022-10-20 PROCEDURE — 99215 OFFICE O/P EST HI 40 MIN: CPT

## 2022-10-20 PROCEDURE — 93000 ELECTROCARDIOGRAM COMPLETE: CPT

## 2022-10-20 RX ORDER — AMOXICILLIN 500 MG/1
500 CAPSULE ORAL
Qty: 21 | Refills: 0 | Status: DISCONTINUED | COMMUNITY
Start: 2022-09-28

## 2022-10-20 RX ORDER — COVID-19 ANTIGEN TEST
KIT MISCELLANEOUS
Qty: 8 | Refills: 0 | Status: DISCONTINUED | COMMUNITY
Start: 2022-08-20

## 2022-11-01 ENCOUNTER — TRANSCRIPTION ENCOUNTER (OUTPATIENT)
Age: 72
End: 2022-11-01

## 2022-11-10 ENCOUNTER — APPOINTMENT (OUTPATIENT)
Dept: CV DIAGNOSTICS | Facility: HOSPITAL | Age: 72
End: 2022-11-10

## 2022-11-10 ENCOUNTER — OUTPATIENT (OUTPATIENT)
Dept: OUTPATIENT SERVICES | Facility: HOSPITAL | Age: 72
LOS: 1 days | End: 2022-11-10
Payer: MEDICARE

## 2022-11-10 DIAGNOSIS — I49.5 SICK SINUS SYNDROME: ICD-10-CM

## 2022-11-10 DIAGNOSIS — Z90.89 ACQUIRED ABSENCE OF OTHER ORGANS: Chronic | ICD-10-CM

## 2022-11-10 PROCEDURE — 93018 CV STRESS TEST I&R ONLY: CPT

## 2022-11-10 PROCEDURE — 93016 CV STRESS TEST SUPVJ ONLY: CPT

## 2022-11-10 PROCEDURE — 93017 CV STRESS TEST TRACING ONLY: CPT

## 2022-11-21 NOTE — HISTORY OF PRESENT ILLNESS
[FreeTextEntry1] : Chika has been doing about the same. Still has some fatigue with actiivty. No palpitatons. 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 She continues on low dose metoprolol and flecainide.

## 2022-12-15 ENCOUNTER — APPOINTMENT (OUTPATIENT)
Dept: RADIOLOGY | Facility: IMAGING CENTER | Age: 72
End: 2022-12-15

## 2022-12-15 ENCOUNTER — OUTPATIENT (OUTPATIENT)
Dept: OUTPATIENT SERVICES | Facility: HOSPITAL | Age: 72
LOS: 1 days | End: 2022-12-15
Payer: MEDICARE

## 2022-12-15 DIAGNOSIS — Z00.8 ENCOUNTER FOR OTHER GENERAL EXAMINATION: ICD-10-CM

## 2022-12-15 DIAGNOSIS — Z90.89 ACQUIRED ABSENCE OF OTHER ORGANS: Chronic | ICD-10-CM

## 2022-12-15 PROCEDURE — 77080 DXA BONE DENSITY AXIAL: CPT | Mod: 26

## 2022-12-15 PROCEDURE — 77080 DXA BONE DENSITY AXIAL: CPT

## 2023-01-17 ENCOUNTER — APPOINTMENT (OUTPATIENT)
Dept: CARDIOLOGY | Facility: CLINIC | Age: 73
End: 2023-01-17
Payer: MEDICARE

## 2023-01-17 ENCOUNTER — NON-APPOINTMENT (OUTPATIENT)
Age: 73
End: 2023-01-17

## 2023-01-17 VITALS
DIASTOLIC BLOOD PRESSURE: 83 MMHG | BODY MASS INDEX: 25.46 KG/M2 | HEART RATE: 66 BPM | OXYGEN SATURATION: 100 % | WEIGHT: 168 LBS | SYSTOLIC BLOOD PRESSURE: 149 MMHG | HEIGHT: 68 IN

## 2023-01-17 PROCEDURE — 93000 ELECTROCARDIOGRAM COMPLETE: CPT

## 2023-01-17 PROCEDURE — 99214 OFFICE O/P EST MOD 30 MIN: CPT

## 2023-01-17 RX ORDER — ESTRADIOL 0.1 MG/G
0.1 CREAM VAGINAL
Qty: 42 | Refills: 0 | Status: ACTIVE | COMMUNITY
Start: 2022-12-19

## 2023-01-17 NOTE — PHYSICAL EXAM
[5th Left ICS - MCL] : palpated at the 5th LICS in the midclavicular line [Normal Rate] : normal [Rhythm Regular] : regular [Normal S1] : normal S1 [Normal S2] : normal S2 [No Murmur] : no murmurs heard [No Pitting Edema] : no pitting edema present [2+] : left 2+ [Right Carotid Bruit] : no bruit heard over the right carotid [Left Carotid Bruit] : no bruit heard over the left carotid [1+] : left 1+ [Normal] : alert and oriented, normal memory

## 2023-01-17 NOTE — CARDIOLOGY SUMMARY
[LVEF ___%] : LVEF [unfilled]% [___] : [unfilled] [None] : no pulmonary hypertension [Normal] : normal LA size [Mild] : mild mitral regurgitation [de-identified] : 1/17/2023 sinus rhythm, diffuse non-specific ST-T wave abnormality, unchanged from 9/4/2020.

## 2023-01-17 NOTE — REVIEW OF SYSTEMS
[Vertigo] : vertigo [Dyspnea on exertion] : dyspnea during exertion [Chest Discomfort] : no chest discomfort [Lower Ext Edema] : no extremity edema [Leg Claudication] : no intermittent leg claudication [Palpitations] : no palpitations [Orthopnea] : no orthopnea [Joint Pain] : joint pain [Negative] : Heme/Lymph

## 2023-01-17 NOTE — HISTORY OF PRESENT ILLNESS
[FreeTextEntry1] : Mrs. Chika Draper is a 72-year-old woman evaluated in the past for mild sick sinus syndrome with tendency to bradycardia and felt to have no significant cardiac disease. After a busy day at work onset of sense that her vision was "swimming" and she tolerated for several hours, but when it persisted the following morning went to the emergency room. She had a complete neurological evaluation and MRI/MRA and was concluded not to have any central nervous system abnormality. She was observed for bradycardia in the CDU and had an echocardiogram that was entirely normal. She was discharged and advised to followup with cardiologist.\par \par Remained well for a time, then sudden episode of profound weakness. Could barely stand up, unable to get to car. Called ambulance and went to [facility] ER. Blood pressure elevated in range of 200/100, but declined without specific treatment. CT head, MRI, complete blood tests all normal and then cardiac echo normal as well including normal bubble study. Had.renal sonogram and urine for metanephrines and saw Dr. Roach in office. Has had further neurology evaluation and presumed diagnosis of Meniere's and had monthly severe vertiginous spells. Neurologist proposed starting acetazolamide, then increased . 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  She is no longer on Diamox and is now on spironolactone, but episodes of vertigo and poor balance persist.  There have been no palpitations and no syncope.\par \par Seen few years ago after woke early in morning dizzy, EMS found hypotensive in 60s, in ER blood pressure satisfactory, but tachycardia interpreted as sinus tach, however probably atrial tachycardia at 118 and on further review have concluded it was atypical atrial flutter with 2:1 block. After few hours broke to sinus and then on further monitoring had runs of atrial fibrillation and started on anticoagulation with apixaban. Was aware of SVT, but not of runs of atrial fibrillation and remained with no palpitations after hospital discharge. Then prolonged palpitations, came to office and sent to ER. Sustained atrial flutter and had ablation which demonstrated unexpected high degree of atrial scarring. Sinus rhythm restored and prior to discharge had cardiac MRI as well as Tc-PYR scan which revealed no cardiac uptake. Last 3 years managed by Electrophysiology.\par \par Since then maintaining sinus rhythm, feeling generally well, can walk moderate distances on level ground, but on incline becomes winded. Also recently blood pressure has been elevated on checks at home.

## 2023-01-17 NOTE — DISCUSSION/SUMMARY
[EKG obtained to assist in diagnosis and management of assessed problem(s)] : EKG obtained to assist in diagnosis and management of assessed problem(s) [Paroxysmal Atrial Fibrillation] : paroxysmal atrial fibrillation [Responding to Treatment] : responding to treatment [Rhythm Control] : rhythm control [Anticoagulation] : anticoagulation [Hypertension] : hypertension [Stable] : stable [Urine Metanephrine] : urine metanephrine [Medication Changes Per Orders] : Medication changes are as documented in orders [Sick Sinus Syndrome] : sick sinus syndrome [Stress Testing] : stress testing [None] : There are no changes in medication management [Patient] : the patient [de-identified] : had ablation  [de-identified] : continue apixaban [de-identified] : was on losartan in past (50 mg BID), unclear why stopped, but will resume initially 50 mg once a day [de-identified] : mild chronotropic incompetence [de-identified] : adequate resting heart rate, dubious that pacemaker will give her substantial increase in exercise tolerance [FreeTextEntry2] : and

## 2023-02-17 ENCOUNTER — NON-APPOINTMENT (OUTPATIENT)
Age: 73
End: 2023-02-17

## 2023-02-17 ENCOUNTER — APPOINTMENT (OUTPATIENT)
Dept: CARDIOLOGY | Facility: CLINIC | Age: 73
End: 2023-02-17
Payer: MEDICARE

## 2023-02-17 VITALS
HEART RATE: 68 BPM | SYSTOLIC BLOOD PRESSURE: 130 MMHG | OXYGEN SATURATION: 99 % | BODY MASS INDEX: 25.85 KG/M2 | DIASTOLIC BLOOD PRESSURE: 80 MMHG | WEIGHT: 170 LBS

## 2023-02-17 VITALS — HEART RATE: 60 BPM | SYSTOLIC BLOOD PRESSURE: 128 MMHG | DIASTOLIC BLOOD PRESSURE: 70 MMHG

## 2023-02-17 PROCEDURE — 93000 ELECTROCARDIOGRAM COMPLETE: CPT

## 2023-02-17 PROCEDURE — 99214 OFFICE O/P EST MOD 30 MIN: CPT

## 2023-02-17 NOTE — DISCUSSION/SUMMARY
[EKG obtained to assist in diagnosis and management of assessed problem(s)] : EKG obtained to assist in diagnosis and management of assessed problem(s) [Paroxysmal Atrial Fibrillation] : paroxysmal atrial fibrillation [Rhythm Control] : rhythm control [Anticoagulation] : anticoagulation [Hypertension] : hypertension [Responding to Treatment] : responding to treatment [Urine Metanephrine] : urine metanephrine [Medication Changes Per Orders] : Medication changes are as documented in orders [Sick Sinus Syndrome] : sick sinus syndrome [Stress Testing] : stress testing [None] : There are no changes in medication management [Patient] : the patient [de-identified] : had ablation  [de-identified] : continue apixaban and continue flecainide 50 mg tablet and takes 1/2 tab in morning and whole tablet at night [de-identified] : was on losartan in past (50 mg BID), unclear why stopped, but resumee initially 50 mg once a day and currently have excellent response [de-identified] : mild chronotropic incompetence [de-identified] : adequate resting heart rate, dubious that pacemaker will give her substantial increase in exercise tolerance

## 2023-02-17 NOTE — PHYSICAL EXAM
[5th Left ICS - MCL] : palpated at the 5th LICS in the midclavicular line [Normal Rate] : normal [Rhythm Regular] : regular [Normal S1] : normal S1 [Normal S2] : normal S2 [No Murmur] : no murmurs heard [No Pitting Edema] : no pitting edema present [2+] : left 2+ [1+] : left 1+ [Normal] : alert and oriented, normal memory [Right Carotid Bruit] : no bruit heard over the right carotid [Left Carotid Bruit] : no bruit heard over the left carotid

## 2023-02-17 NOTE — REVIEW OF SYSTEMS
[Vertigo] : vertigo [Dyspnea on exertion] : dyspnea during exertion [Joint Pain] : joint pain [Negative] : Heme/Lymph [Chest Discomfort] : no chest discomfort [Lower Ext Edema] : no extremity edema [Leg Claudication] : no intermittent leg claudication [Palpitations] : no palpitations [Orthopnea] : no orthopnea

## 2023-02-17 NOTE — HISTORY OF PRESENT ILLNESS
[FreeTextEntry1] : Mrs. Chika Draper is a 72-year-old woman evaluated in the past for mild sick sinus syndrome with tendency to bradycardia and felt to have no significant cardiac disease. After a busy day at work onset of sense that her vision was "swimming" and she tolerated for several hours, but when it persisted the following morning went to the emergency room. She had a complete neurological evaluation and MRI/MRA and was concluded not to have any central nervous system abnormality. She was observed for bradycardia in the CDU and had an echocardiogram that was entirely normal. She was discharged and advised to followup with cardiologist.\par \par Remained well for a time, then sudden episode of profound weakness. Could barely stand up, unable to get to car. Called ambulance and went to [facility] ER. Blood pressure elevated in range of 200/100, but declined without specific treatment. CT head, MRI, complete blood tests all normal and then cardiac echo normal as well including normal bubble study. Had.renal sonogram and urine for metanephrines and saw Dr. Roach in office. Has had further neurology evaluation and presumed diagnosis of Meniere's and had monthly severe vertiginous spells. Neurologist proposed starting acetazolamide, then increased . 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  She is no longer on Diamox and is now on spironolactone, but episodes of vertigo and poor balance persist.  There have been no palpitations and no syncope.\par \par Seen few years ago after woke early in morning dizzy, EMS found hypotensive in 60s, in ER blood pressure satisfactory, but tachycardia interpreted as sinus tach, however probably atrial tachycardia at 118 and on further review have concluded it was atypical atrial flutter with 2:1 block. After few hours broke to sinus and then on further monitoring had runs of atrial fibrillation and started on anticoagulation with apixaban. Was aware of SVT, but not of runs of atrial fibrillation and remained with no palpitations after hospital discharge. Then prolonged palpitations, came to office and sent to ER. Sustained atrial flutter and had ablation which demonstrated unexpected high degree of atrial scarring. Sinus rhythm restored and prior to discharge had cardiac MRI as well as Tc-PYR scan which revealed no cardiac uptake. Last 3 years managed by Electrophysiology.\par \par Since then maintaining sinus rhythm, feeling generally well, can walk moderate distances on level ground, but on incline becomes winded. Also recently blood pressure has been elevated on checks at home.

## 2023-02-17 NOTE — CARDIOLOGY SUMMARY
[de-identified] : 1/17/2023 sinus rhythm, diffuse non-specific ST-T wave abnormality, unchanged from 9/4/2020.\par 2/17/2023

## 2023-02-28 ENCOUNTER — APPOINTMENT (OUTPATIENT)
Dept: CARDIOLOGY | Facility: CLINIC | Age: 73
End: 2023-02-28
Payer: MEDICARE

## 2023-02-28 VITALS
RESPIRATION RATE: 17 BRPM | HEIGHT: 68 IN | HEART RATE: 63 BPM | DIASTOLIC BLOOD PRESSURE: 83 MMHG | SYSTOLIC BLOOD PRESSURE: 174 MMHG | OXYGEN SATURATION: 100 % | WEIGHT: 170 LBS | BODY MASS INDEX: 25.76 KG/M2

## 2023-02-28 VITALS — HEART RATE: 60 BPM | SYSTOLIC BLOOD PRESSURE: 150 MMHG | DIASTOLIC BLOOD PRESSURE: 78 MMHG

## 2023-02-28 DIAGNOSIS — R23.3 SPONTANEOUS ECCHYMOSES: ICD-10-CM

## 2023-02-28 PROCEDURE — 99213 OFFICE O/P EST LOW 20 MIN: CPT

## 2023-02-28 NOTE — HISTORY OF PRESENT ILLNESS
[FreeTextEntry1] : Mrs. Chika Draper is a 72-year-old woman evaluated in the past for mild sick sinus syndrome with tendency to bradycardia and felt to have no significant cardiac disease. After a busy day at work onset of sense that her vision was "swimming" and she tolerated for several hours, but when it persisted the following morning went to the emergency room. She had a complete neurological evaluation and MRI/MRA and was concluded not to have any central nervous system abnormality. She was observed for bradycardia in the CDU and had an echocardiogram that was entirely normal. She was discharged and advised to followup with cardiologist.\par \par Remained well for a time, then sudden episode of profound weakness. Could barely stand up, unable to get to car. Called ambulance and went to [facility] ER. Blood pressure elevated in range of 200/100, but declined without specific treatment. CT head, MRI, complete blood tests all normal and then cardiac echo normal as well including normal bubble study. Had.renal sonogram and urine for metanephrines and saw Dr. Roach in office. Has had further neurology evaluation and presumed diagnosis of Meniere's and had monthly severe vertiginous spells. Neurologist proposed starting acetazolamide, then increased . 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  She is no longer on Diamox and is now on spironolactone, but episodes of vertigo and poor balance persist.  There have been no palpitations and no syncope.\par \par Seen few years ago after woke early in morning dizzy, EMS found hypotensive in 60s, in ER blood pressure satisfactory, but tachycardia interpreted as sinus tach, however probably atrial tachycardia at 118 and on further review have concluded it was atypical atrial flutter with 2:1 block. After few hours broke to sinus and then on further monitoring had runs of atrial fibrillation and started on anticoagulation with apixaban. Was aware of SVT, but not of runs of atrial fibrillation and remained with no palpitations after hospital discharge. Then prolonged palpitations, came to office and sent to ER. Sustained atrial flutter and had ablation which demonstrated unexpected high degree of atrial scarring. Sinus rhythm restored and prior to discharge had cardiac MRI as well as Tc-PYR scan which revealed no cardiac uptake. Last 3 years managed by Electrophysiology.\par \par Since then maintaining sinus rhythm, feeling generally well, can walk moderate distances on level ground, but on incline becomes winded. Also recently blood pressure has been elevated on checks at home.\par \par Calls today for urgent visit, hand bleed. Last night sudden discomfort, swelling and ecchymosis of right hand. Placed ice compress and controlled.

## 2023-02-28 NOTE — CARDIOLOGY SUMMARY
[de-identified] : 1/17/2023 sinus rhythm, diffuse non-specific ST-T wave abnormality, unchanged from 9/4/2020.\par 2/17/2023 sinus rhythm 60, borderline 1st degree heart block  ms, non-specific ST-T wave abnormality.

## 2023-02-28 NOTE — DISCUSSION/SUMMARY
[Paroxysmal Atrial Fibrillation] : paroxysmal atrial fibrillation [Rhythm Control] : rhythm control [Anticoagulation] : anticoagulation [Hypertension] : hypertension [Responding to Treatment] : responding to treatment [Urine Metanephrine] : urine metanephrine [Sick Sinus Syndrome] : sick sinus syndrome [Stress Testing] : stress testing [None] : There are no changes in medication management [Patient] : the patient [de-identified] : had ablation  [de-identified] : remain on apixaban despite spontaneous bleed of back of hand, and continue flecainide 50 mg tablet and takes 1/2 tab in morning and whole tablet at night [de-identified] : was on losartan in past (50 mg BID), unclear why stopped, but resumed initially 50 mg once a day and currently have excellent response [de-identified] : mild chronotropic incompetence [de-identified] : adequate resting heart rate, dubious that pacemaker will give her substantial increase in exercise tolerance [FreeTextEntry2] : and

## 2023-02-28 NOTE — PHYSICAL EXAM
[5th Left ICS - MCL] : palpated at the 5th LICS in the midclavicular line [Normal Rate] : normal [Rhythm Regular] : regular [Normal S1] : normal S1 [Normal S2] : normal S2 [No Murmur] : no murmurs heard [No Pitting Edema] : no pitting edema present [2+] : left 2+ [1+] : left 1+ [Normal] : alert and oriented, normal memory [Right Carotid Bruit] : no bruit heard over the right carotid [Left Carotid Bruit] : no bruit heard over the left carotid [de-identified] : Right back of hand ecchymosis and small hematoma, skin not taut

## 2023-03-01 LAB
ALBUMIN SERPL ELPH-MCNC: 4.4 G/DL
ALP BLD-CCNC: 64 U/L
ALT SERPL-CCNC: 16 U/L
ANION GAP SERPL CALC-SCNC: 9 MMOL/L
AST SERPL-CCNC: 19 U/L
BASOPHILS # BLD AUTO: 0.05 K/UL
BASOPHILS NFR BLD AUTO: 0.8 %
BILIRUB SERPL-MCNC: 0.3 MG/DL
BUN SERPL-MCNC: 18 MG/DL
CALCIUM SERPL-MCNC: 9.9 MG/DL
CHLORIDE SERPL-SCNC: 106 MMOL/L
CO2 SERPL-SCNC: 26 MMOL/L
CREAT SERPL-MCNC: 1.02 MG/DL
EGFR: 58 ML/MIN/1.73M2
EOSINOPHIL # BLD AUTO: 0.08 K/UL
EOSINOPHIL NFR BLD AUTO: 1.3 %
GLUCOSE SERPL-MCNC: 108 MG/DL
HCT VFR BLD CALC: 39.8 %
HGB BLD-MCNC: 13.1 G/DL
IMM GRANULOCYTES NFR BLD AUTO: 0.3 %
LYMPHOCYTES # BLD AUTO: 1.65 K/UL
LYMPHOCYTES NFR BLD AUTO: 27.3 %
MAN DIFF?: NORMAL
MCHC RBC-ENTMCNC: 29.8 PG
MCHC RBC-ENTMCNC: 32.9 GM/DL
MCV RBC AUTO: 90.5 FL
MONOCYTES # BLD AUTO: 0.55 K/UL
MONOCYTES NFR BLD AUTO: 9.1 %
NEUTROPHILS # BLD AUTO: 3.7 K/UL
NEUTROPHILS NFR BLD AUTO: 61.2 %
PLATELET # BLD AUTO: 182 K/UL
POTASSIUM SERPL-SCNC: 4.2 MMOL/L
PROT SERPL-MCNC: 7.1 G/DL
RBC # BLD: 4.4 M/UL
RBC # FLD: 12.9 %
SODIUM SERPL-SCNC: 141 MMOL/L
WBC # FLD AUTO: 6.05 K/UL

## 2023-03-05 NOTE — ED PROVIDER NOTE - NSCAREINITIATED _GEN_ER
Selin Yeung(Attending)
PAST SURGICAL HISTORY:  H/O lithotripsy 4/2009    H/O surgical biopsy upper chest-7/2014

## 2023-04-27 ENCOUNTER — APPOINTMENT (OUTPATIENT)
Dept: ELECTROPHYSIOLOGY | Facility: CLINIC | Age: 73
End: 2023-04-27
Payer: MEDICARE

## 2023-04-27 ENCOUNTER — NON-APPOINTMENT (OUTPATIENT)
Age: 73
End: 2023-04-27

## 2023-04-27 VITALS
OXYGEN SATURATION: 99 % | DIASTOLIC BLOOD PRESSURE: 89 MMHG | BODY MASS INDEX: 26.22 KG/M2 | HEART RATE: 59 BPM | WEIGHT: 173 LBS | HEIGHT: 68 IN | SYSTOLIC BLOOD PRESSURE: 162 MMHG

## 2023-04-27 DIAGNOSIS — E85.4 ORGAN-LIMITED AMYLOIDOSIS: ICD-10-CM

## 2023-04-27 DIAGNOSIS — J99 ORGAN-LIMITED AMYLOIDOSIS: ICD-10-CM

## 2023-04-27 DIAGNOSIS — I47.1 SUPRAVENTRICULAR TACHYCARDIA: ICD-10-CM

## 2023-04-27 PROCEDURE — 93000 ELECTROCARDIOGRAM COMPLETE: CPT

## 2023-04-27 PROCEDURE — 99214 OFFICE O/P EST MOD 30 MIN: CPT

## 2023-04-27 RX ORDER — RALOXIFENE HYDROCHLORIDE 60 MG/1
60 TABLET ORAL DAILY
Refills: 0 | Status: ACTIVE | COMMUNITY
Start: 2023-04-27

## 2023-04-27 RX ORDER — CALCIUM CITRATE/VITAMIN D3 250MG-5MCG
TABLET ORAL
Refills: 0 | Status: DISCONTINUED | COMMUNITY
End: 2023-04-27

## 2023-04-27 NOTE — DISCUSSION/SUMMARY
[FreeTextEntry1] : She is not having any arrhythmic symptoms. I do not recommend any further EP intervention such as device implant, cardioneuroablation or repeat ablation unless symptoms worsen and we can document more severe chronotropic incompetence. She can follow up with Dr. Cummings and see me on a prn basis. She was started on losartan and still has occasional SBP in the 140-160 range and I instructed her to see Dr. Cummings about this.  [EKG obtained to assist in diagnosis and management of assessed problem(s)] : EKG obtained to assist in diagnosis and management of assessed problem(s)

## 2023-04-27 NOTE — HISTORY OF PRESENT ILLNESS
[FreeTextEntry1] : Mrs. Guadarrama denies any palpitations. She remains on low dose metoprolol and flecainide for residual nonsustained AT after her ablation. Although it does not appear that she has amyloid in her heart, her atrial electrograms suggested extensive scar and it would not surprise me if she does not have some amyloid involvement of her atrium only. She underwent a treadmill test last fall and heart rate increased to 90 bpm at 5 minutes. Test was stopped due to dyspnea. No clear ST changes and rare VPD's. Degree of dyspnea seems out of proportion to heart rate. I did not feel a pacemaker was warranted. No atrial arrhythmias.\par She recently had a mild case of COVID from which she has recovered.She has hypertension being managed by Dr. Cummings.

## 2023-05-11 ENCOUNTER — NON-APPOINTMENT (OUTPATIENT)
Age: 73
End: 2023-05-11

## 2023-05-11 ENCOUNTER — APPOINTMENT (OUTPATIENT)
Dept: CARDIOLOGY | Facility: CLINIC | Age: 73
End: 2023-05-11
Payer: MEDICARE

## 2023-05-11 VITALS
BODY MASS INDEX: 26.91 KG/M2 | OXYGEN SATURATION: 100 % | HEART RATE: 55 BPM | WEIGHT: 177 LBS | SYSTOLIC BLOOD PRESSURE: 138 MMHG | DIASTOLIC BLOOD PRESSURE: 74 MMHG

## 2023-05-11 PROCEDURE — 99214 OFFICE O/P EST MOD 30 MIN: CPT

## 2023-05-11 PROCEDURE — 93000 ELECTROCARDIOGRAM COMPLETE: CPT

## 2023-05-11 RX ORDER — LOSARTAN POTASSIUM 50 MG/1
50 TABLET, FILM COATED ORAL
Qty: 180 | Refills: 3 | Status: ACTIVE | COMMUNITY
Start: 2020-10-07 | End: 1900-01-01

## 2023-05-11 NOTE — CARDIOLOGY SUMMARY
[de-identified] : 1/17/2023 sinus rhythm, diffuse non-specific ST-T wave abnormality, unchanged from 9/4/2020.\par 2/17/2023 sinus rhythm 60, borderline 1st degree heart block  ms, non-specific ST-T wave abnormality.\par 5/11/2023

## 2023-05-11 NOTE — HISTORY OF PRESENT ILLNESS
[FreeTextEntry1] : Mrs. Chika Draper is a 72-year-old woman evaluated in the past for mild sick sinus syndrome with tendency to bradycardia and felt to have no significant cardiac disease. After a busy day at work onset of sense that her vision was "swimming" and she tolerated for several hours, but when it persisted the following morning went to the emergency room. She had a complete neurological evaluation and MRI/MRA and was concluded not to have any central nervous system abnormality. She was observed for bradycardia in the CDU and had an echocardiogram that was entirely normal. She was discharged and advised to followup with cardiologist.\par \par Remained well for a time, then sudden episode of profound weakness. Could barely stand up, unable to get to car. Called ambulance and went to [facility] ER. Blood pressure elevated in range of 200/100, but declined without specific treatment. CT head, MRI, complete blood tests all normal and then cardiac echo normal as well including normal bubble study. Had.renal sonogram and urine for metanephrines and saw Dr. Roach in office. Has had further neurology evaluation and presumed diagnosis of Meniere's and had monthly severe vertiginous spells. Neurologist proposed starting acetazolamide, then increased . 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  She is no longer on Diamox and is now on spironolactone, but episodes of vertigo and poor balance persist.  There have been no palpitations and no syncope.\par \par Seen few years ago after woke early in morning dizzy, EMS found hypotensive in 60s, in ER blood pressure satisfactory, but tachycardia interpreted as sinus tach, however probably atrial tachycardia at 118 and on further review have concluded it was atypical atrial flutter with 2:1 block. After few hours broke to sinus and then on further monitoring had runs of atrial fibrillation and started on anticoagulation with apixaban. Was aware of SVT, but not of runs of atrial fibrillation and remained with no palpitations after hospital discharge. Then prolonged palpitations, came to office and sent to ER. Sustained atrial flutter and had ablation which demonstrated unexpected high degree of atrial scarring. Sinus rhythm restored and prior to discharge had cardiac MRI as well as Tc-PYR scan which revealed no cardiac uptake. Last 3 years managed by Electrophysiology.\par \par Since then maintaining sinus rhythm, feeling generally well, can walk moderate distances on level ground, but on incline becomes winded. Also recently blood pressure has been elevated on checks at home.\par \par Call few months ago for urgent visit, hand bleed. Prior night sudden discomfort, swelling and ecchymosis of right hand. Placed ice compress and controlled.\par \par Since then feeling well, no further bleeding issues, no palpitations and traveled to [place], did all activities without restriction, but on return had COVID, mild case and recovered quickly. Checks blood pressure regularly and often mildly to moderately elevated.

## 2023-05-11 NOTE — DISCUSSION/SUMMARY
[EKG obtained to assist in diagnosis and management of assessed problem(s)] : EKG obtained to assist in diagnosis and management of assessed problem(s) [Paroxysmal Atrial Fibrillation] : paroxysmal atrial fibrillation [Rhythm Control] : rhythm control [Anticoagulation] : anticoagulation [Hypertension] : hypertension [Responding to Treatment] : responding to treatment [Urine Metanephrine] : urine metanephrine [Medication Changes Per Orders] : Medication changes are as documented in orders [Sick Sinus Syndrome] : sick sinus syndrome [Stress Testing] : stress testing [None] : There are no changes in medication management [Patient] : the patient [de-identified] : had ablation  [de-identified] : remain on apixaban [de-identified] : but not optimal [de-identified] : increase losartan to 50 mg BID [de-identified] : mild chronotropic incompetence [de-identified] : adequate resting heart rate, dubious that pacemaker will give her substantial increase in exercise tolerance

## 2023-06-08 ENCOUNTER — APPOINTMENT (OUTPATIENT)
Dept: SPEECH THERAPY | Facility: CLINIC | Age: 73
End: 2023-06-08

## 2023-06-08 ENCOUNTER — OUTPATIENT (OUTPATIENT)
Dept: OUTPATIENT SERVICES | Facility: HOSPITAL | Age: 73
LOS: 1 days | Discharge: HOME | End: 2023-06-08

## 2023-06-08 DIAGNOSIS — Z90.89 ACQUIRED ABSENCE OF OTHER ORGANS: Chronic | ICD-10-CM

## 2023-06-08 NOTE — HISTORY OF PRESENT ILLNESS
[FreeTextEntry1] : 73 year old referred for audiological evaluation to monitor hearing. Patient followed by Dr. Lagos- last seen in 2021- results at that time revealed severe to profound SNHL in the left ear and normal hearing in the right ear. CROS BAILEY has been recommended however patient did not obtain. Patient has compensated well with her hearing loss- has speakers sit on her right hand side and is aware of limitations of background noise however patient notices increased difficulty listening recently and may be interested in trying hearing aids. Hx of vestibular migraines and vertigo, followed by neurologist Dr. Granados. Hx tinnitus.

## 2023-06-08 NOTE — ASSESSMENT
[FreeTextEntry1] : Results today in agreement with previous evaluation. \par \par Counseled patient regarding results obtained today, she expressed understanding of all. Recommended she schedule follow up with ENT for medical clearance and BICROS evaluation in hearing aid dispensary when ready to move forward. Briefly discussed cochlear implant evaluation pending consistent use of BICROS HA- pt understood. \par \par Mailed copy of audiogram to patients home at her request.

## 2023-06-08 NOTE — PLAN
[FreeTextEntry2] : 1. Follow up with ENT re: asymmetrical SNHL, please provide medical clearance for amplification\par 2. Trial of amplification, CROS/ BiCROS HA with ENT medical clearance\par 3. Annual audiological re evaluation to monitor \par 4. Further recommendations pending above.

## 2023-06-08 NOTE — PROCEDURE
[] : Acoustic Immittance: [Type A Tympanogram] : Type A Normal [] : Complete Audiological Evaluation [Good] : good [Headphones] : headphones [de-identified] : Severe to profound sensorineural hearing loss with corroborative SDT. Attempted SRT however patient could not repeat words.  [de-identified] : Hearing within normal limits 250 Hz- 6000 Hz sloping to mild hearing loss thereafter.

## 2023-06-12 DIAGNOSIS — H90.A22 SENSORINEURAL HEARING LOSS, UNILATERAL, LEFT EAR, WITH RESTRICTED HEARING ON THE CONTRALATERAL SIDE: ICD-10-CM

## 2023-06-30 ENCOUNTER — APPOINTMENT (OUTPATIENT)
Dept: OTOLARYNGOLOGY | Facility: CLINIC | Age: 73
End: 2023-06-30
Payer: MEDICARE

## 2023-06-30 VITALS
BODY MASS INDEX: 24.1 KG/M2 | WEIGHT: 159 LBS | DIASTOLIC BLOOD PRESSURE: 67 MMHG | HEIGHT: 68 IN | SYSTOLIC BLOOD PRESSURE: 132 MMHG | HEART RATE: 47 BPM | TEMPERATURE: 97.4 F

## 2023-06-30 DIAGNOSIS — H90.3 SENSORINEURAL HEARING LOSS, BILATERAL: ICD-10-CM

## 2023-06-30 DIAGNOSIS — H81.02 MENIERE'S DISEASE, LEFT EAR: ICD-10-CM

## 2023-06-30 DIAGNOSIS — J34.2 DEVIATED NASAL SEPTUM: ICD-10-CM

## 2023-06-30 DIAGNOSIS — J31.0 CHRONIC RHINITIS: ICD-10-CM

## 2023-06-30 PROCEDURE — 31231 NASAL ENDOSCOPY DX: CPT

## 2023-06-30 PROCEDURE — 99213 OFFICE O/P EST LOW 20 MIN: CPT | Mod: 25

## 2023-06-30 NOTE — END OF VISIT
[FreeTextEntry3] : I personally saw and examined DEREK MULLIGAN in detail. I spoke to TERELL Pedraza regarding the assessment and plan of care. I reviewed the above assessment and plan of care, and agree. I have made changes in changes in the body of the note where appropriate.I personally reviewed the HPI, PMH, FH, SH, ROS and medications/allergies. I have spoken to TERELL Pedraza regarding the history and have personally determined the assessment and plan of care, and documented this myself. I was present and participated in all key portions of the encounter and all procedures noted above. I have made changes in the body of the note where appropriate.\par \par Attesting Faculty: See Attending Signature Below \par \par \par

## 2023-06-30 NOTE — ASSESSMENT
[FreeTextEntry1] : Ms. MULLIGAN 73 year F with hx of asymmetrical  SNHL w/ HA and vestibular migraines here for hearing aids clearance. She currently wears hearing aids only in the right ear, needs clearance for the left ear. Also complains of dry throat and cough x several weeks. \par \par Asymmetrical SNHL:\par -cleared for hearing aids \par \par PND/ Dry Throat w/ Cough:\par -Rx: Nasogel \par \par \par \par f/u prn

## 2023-06-30 NOTE — HISTORY OF PRESENT ILLNESS
[de-identified] : 74 yo\par PAtient with hx of SNHL w/ HA and vestibular migraines here for hearing aids clearance. She currently wears hearing aids only in the right ear, needs clearance for the left ear. She had a hearing test which showed an asymmetry. \par Also complains of dry throat and cough x several weeks.

## 2023-08-07 RX ORDER — METOPROLOL SUCCINATE 25 MG/1
25 TABLET, EXTENDED RELEASE ORAL
Qty: 90 | Refills: 3 | Status: ACTIVE | COMMUNITY
Start: 2020-12-09 | End: 1900-01-01

## 2023-08-18 ENCOUNTER — NON-APPOINTMENT (OUTPATIENT)
Age: 73
End: 2023-08-18

## 2023-08-18 ENCOUNTER — APPOINTMENT (OUTPATIENT)
Dept: CARDIOLOGY | Facility: CLINIC | Age: 73
End: 2023-08-18
Payer: MEDICARE

## 2023-08-18 VITALS
HEART RATE: 56 BPM | HEIGHT: 68 IN | BODY MASS INDEX: 25.31 KG/M2 | SYSTOLIC BLOOD PRESSURE: 146 MMHG | WEIGHT: 167 LBS | DIASTOLIC BLOOD PRESSURE: 80 MMHG | OXYGEN SATURATION: 96 %

## 2023-08-18 PROCEDURE — 99214 OFFICE O/P EST MOD 30 MIN: CPT

## 2023-08-18 PROCEDURE — 93000 ELECTROCARDIOGRAM COMPLETE: CPT

## 2023-08-18 NOTE — DISCUSSION/SUMMARY
[EKG obtained to assist in diagnosis and management of assessed problem(s)] : EKG obtained to assist in diagnosis and management of assessed problem(s) [Paroxysmal Atrial Fibrillation] : paroxysmal atrial fibrillation [Rhythm Control] : rhythm control [Anticoagulation] : anticoagulation [Hypertension] : hypertension [Responding to Treatment] : responding to treatment [Urine Metanephrine] : urine metanephrine [Sick Sinus Syndrome] : sick sinus syndrome [Stress Testing] : stress testing [None] : There are no changes in medication management [Patient] : the patient [de-identified] : had ablation  [de-identified] : remain on apixaban [de-identified] : favor remaining on losartan to 50 mg BID [de-identified] : mild chronotropic incompetence [de-identified] : adequate resting heart rate, dubious that pacemaker will give her substantial increase in exercise tolerance

## 2023-08-18 NOTE — CARDIOLOGY SUMMARY
[de-identified] : 1/17/2023 sinus rhythm, diffuse non-specific ST-T wave abnormality, unchanged from 9/4/2020. 2/17/2023 sinus rhythm 60, borderline 1st degree heart block  ms, non-specific ST-T wave abnormality. 5/11/2023 8/18/2023 sinus rhythm 56, borderline 1st degree heart block  ms, non-specific ST-T wave abnormality.

## 2023-08-18 NOTE — HISTORY OF PRESENT ILLNESS
[FreeTextEntry1] : Mrs. Chika Draper is a 73-year-old woman evaluated in the past for mild sick sinus syndrome with tendency to bradycardia and felt to have no significant cardiac disease. After a busy day at work onset of sense that her vision was "swimming" and she tolerated for several hours, but when it persisted the following morning went to the emergency room. She had a complete neurological evaluation and MRI/MRA and was concluded not to have any central nervous system abnormality. She was observed for bradycardia in the CDU and had an echocardiogram that was entirely normal. She was discharged and advised to followup with cardiologist.  Remained well for a time, then sudden episode of profound weakness. Could barely stand up, unable to get to car. Called ambulance and went to [facility] ER. Blood pressure elevated in range of 200/100, but declined without specific treatment. CT head, MRI, complete blood tests all normal and then cardiac echo normal as well including normal bubble study. Had.renal sonogram and urine for metanephrines and saw Dr. Roach in office. Has had further neurology evaluation and presumed diagnosis of Meniere's and had monthly severe vertiginous spells. Neurologist proposed starting acetazolamide, then increased . Event Monitor proved unremarkable.  Has ongoing efforts to control Meniere's disease and continuing modification of medication regimen.  She is no longer on Diamox and is now on spironolactone, but episodes of vertigo and poor balance persist.  There have been no palpitations and no syncope.  Seen few years ago after woke early in morning dizzy, EMS found hypotensive in 60s, in ER blood pressure satisfactory, but tachycardia interpreted as sinus tach, however probably atrial tachycardia at 118 and on further review have concluded it was atypical atrial flutter with 2:1 block. After few hours broke to sinus and then on further monitoring had runs of atrial fibrillation and started on anticoagulation with apixaban. Was aware of SVT, but not of runs of atrial fibrillation and remained with no palpitations after hospital discharge. Then prolonged palpitations, came to office and sent to ER. Sustained atrial flutter and had ablation which demonstrated unexpected high degree of atrial scarring. Sinus rhythm restored and prior to discharge had cardiac MRI as well as Tc-PYR scan which revealed no cardiac uptake. Last 3 years managed by Electrophysiology.  Since then maintaining sinus rhythm, feeling generally well, can walk moderate distances on level ground, but on incline becomes winded. Also recently blood pressure has been elevated on checks at home.  Call few months ago for urgent visit, hand bleed. Prior night sudden discomfort, swelling and ecchymosis of right hand. Placed ice compress and controlled.  Since then feeling well, no further bleeding issues, no palpitations and traveled to [place], did all activities without restriction, but on return had COVID, mild case and recovered quickly. Checks blood pressure regularly and generally favorable.

## 2023-11-02 ENCOUNTER — RX RENEWAL (OUTPATIENT)
Age: 73
End: 2023-11-02

## 2023-11-06 ENCOUNTER — RX RENEWAL (OUTPATIENT)
Age: 73
End: 2023-11-06

## 2024-01-02 ENCOUNTER — APPOINTMENT (OUTPATIENT)
Dept: PHARMACY | Facility: CLINIC | Age: 74
End: 2024-01-02

## 2024-01-02 PROCEDURE — ZZZZZ: CPT

## 2024-02-02 ENCOUNTER — APPOINTMENT (OUTPATIENT)
Dept: PHARMACY | Facility: CLINIC | Age: 74
End: 2024-02-02
Payer: SELF-PAY

## 2024-02-02 PROCEDURE — V5221G: CUSTOM

## 2024-02-02 NOTE — REASON FOR VISIT
[Consultation] : consultation for [Hearing Aid] : Hearing Aid [FreeTextEntry2] : DEREK MULLIGAN is being seen today for a hearing aid dispensing.

## 2024-02-02 NOTE — HISTORY OF PRESENT ILLNESS
[FreeTextEntry1] : Patient is a 73 year old female who has been seen by Dr. Lagos for hearing loss and vestibular migraines. Patient reports that she has had hearing loss and tinnitus in the left ear for several years. Patient has been medically cleared for hearing aids. Most recent hearing test results reveal severe to profound SNHL in the left ear and normal hearing sloping to mild hearing loss in the right ear.

## 2024-02-16 ENCOUNTER — APPOINTMENT (OUTPATIENT)
Dept: PHARMACY | Facility: CLINIC | Age: 74
End: 2024-02-16
Payer: SELF-PAY

## 2024-02-26 ENCOUNTER — APPOINTMENT (OUTPATIENT)
Dept: PHARMACY | Facility: CLINIC | Age: 74
End: 2024-02-26
Payer: SELF-PAY

## 2024-02-26 PROCEDURE — V5299A: CUSTOM

## 2024-03-13 ENCOUNTER — APPOINTMENT (OUTPATIENT)
Dept: CARDIOLOGY | Facility: CLINIC | Age: 74
End: 2024-03-13
Payer: MEDICARE

## 2024-03-13 ENCOUNTER — NON-APPOINTMENT (OUTPATIENT)
Age: 74
End: 2024-03-13

## 2024-03-13 VITALS
SYSTOLIC BLOOD PRESSURE: 124 MMHG | DIASTOLIC BLOOD PRESSURE: 72 MMHG | HEIGHT: 68 IN | OXYGEN SATURATION: 97 % | HEART RATE: 65 BPM | WEIGHT: 177 LBS | BODY MASS INDEX: 26.83 KG/M2

## 2024-03-13 DIAGNOSIS — I10 ESSENTIAL (PRIMARY) HYPERTENSION: ICD-10-CM

## 2024-03-13 DIAGNOSIS — I49.5 SICK SINUS SYNDROME: ICD-10-CM

## 2024-03-13 DIAGNOSIS — I48.0 PAROXYSMAL ATRIAL FIBRILLATION: ICD-10-CM

## 2024-03-13 PROCEDURE — 93000 ELECTROCARDIOGRAM COMPLETE: CPT

## 2024-03-13 PROCEDURE — G2211 COMPLEX E/M VISIT ADD ON: CPT

## 2024-03-13 PROCEDURE — 99214 OFFICE O/P EST MOD 30 MIN: CPT

## 2024-03-13 NOTE — REVIEW OF SYSTEMS
[Vertigo] : vertigo [Dyspnea on exertion] : dyspnea during exertion [Joint Pain] : joint pain [Negative] : Heme/Lymph [Chest Discomfort] : no chest discomfort [Lower Ext Edema] : no extremity edema [Leg Claudication] : no intermittent leg claudication [Orthopnea] : no orthopnea [Palpitations] : no palpitations

## 2024-03-13 NOTE — DISCUSSION/SUMMARY
[EKG obtained to assist in diagnosis and management of assessed problem(s)] : EKG obtained to assist in diagnosis and management of assessed problem(s) [Paroxysmal Atrial Fibrillation] : paroxysmal atrial fibrillation [Rhythm Control] : rhythm control [Anticoagulation] : anticoagulation [Hypertension] : hypertension [Responding to Treatment] : responding to treatment [Urine Metanephrine] : urine metanephrine [Sick Sinus Syndrome] : sick sinus syndrome [Stress Testing] : stress testing [None] : There are no changes in medication management [Patient] : the patient [de-identified] : had ablation  [de-identified] : remain on apixaban and on flecainide 50 mg - 1/2 tab in morning and 1 tab in evening [de-identified] : remaining on losartan to 50 mg BID [de-identified] : mild chronotropic incompetence [de-identified] : adequate resting heart rate, dubious that pacemaker will give her substantial increase in exercise tolerance

## 2024-03-13 NOTE — CARDIOLOGY SUMMARY
[de-identified] : 1/17/2023 sinus rhythm, diffuse non-specific ST-T wave abnormality, unchanged from 9/4/2020. 2/17/2023 sinus rhythm 60, borderline 1st degree heart block  ms, non-specific ST-T wave abnormality. 5/11/2023 8/18/2023 sinus rhythm 56, borderline 1st degree heart block  ms, non-specific ST-T wave abnormality. 3/13/2024 sinus bradycardia, 1st degree heart block  ms, non-specific ST-T wave abnormality. Unchanged from multiple prior.

## 2024-03-13 NOTE — HISTORY OF PRESENT ILLNESS
[FreeTextEntry1] : Mrs. Chika Draper is a 73-year-old woman evaluated in the past for mild sick sinus syndrome with tendency to bradycardia and felt to have no significant cardiac disease. After a busy day at work onset of sense that her vision was "swimming" and she tolerated for several hours, but when it persisted the following morning went to the emergency room. She had a complete neurological evaluation and MRI/MRA and was concluded not to have any central nervous system abnormality. She was observed for bradycardia in the CDU and had an echocardiogram that was entirely normal. She was discharged and advised to followup with cardiologist.  Remained well for a time, then sudden episode of profound weakness. Could barely stand up, unable to get to car. Called ambulance and went to [facility] ER. Blood pressure elevated in range of 200/100, but declined without specific treatment. CT head, MRI, complete blood tests all normal and then cardiac echo normal as well including normal bubble study. Had.renal sonogram and urine for metanephrines and saw Dr. Roach in office. Has had further neurology evaluation and presumed diagnosis of Meniere's and had monthly severe vertiginous spells. Neurologist proposed starting acetazolamide, then increased . Event Monitor proved unremarkable.  Has ongoing efforts to control Meniere's disease and continuing modification of medication regimen.  She is no longer on Diamox and is now on spironolactone, but episodes of vertigo and poor balance persist.  There have been no palpitations and no syncope.  Seen few years ago after woke early in morning dizzy, EMS found hypotensive in 60s, in ER blood pressure satisfactory, but tachycardia interpreted as sinus tach, however probably atrial tachycardia at 118 and on further review have concluded it was atypical atrial flutter with 2:1 block. After few hours broke to sinus and then on further monitoring had runs of atrial fibrillation and started on anticoagulation with apixaban. Was aware of SVT, but not of runs of atrial fibrillation and remained with no palpitations after hospital discharge. Then prolonged palpitations, came to office and sent to ER. Sustained atrial flutter and had ablation which demonstrated unexpected high degree of atrial scarring. Sinus rhythm restored and prior to discharge had cardiac MRI as well as Tc-PYR scan which revealed no cardiac uptake. Last few years co-managed with Electrophysiology.  Maintaining sinus rhythm, feeling generally well, can walk moderate distances on level ground, but on incline becomes winded. Also recently blood pressure has been elevated on checks at home.  Call few months ago for urgent visit, hand bleed. Prior night sudden discomfort, swelling and ecchymosis of right hand. Placed ice compress and controlled.  Since then feeling well, no further bleeding issues, no palpitations and traveled to [place], did all activities without restriction, but on return had COVID, mild case and recovered quickly. Checks blood pressure regularly and generally favorable.  Continues well, active, walking for exercise, no dyspnea and only infrequent, transient palpitation.

## 2024-05-10 ENCOUNTER — APPOINTMENT (OUTPATIENT)
Dept: PHARMACY | Facility: CLINIC | Age: 74
End: 2024-05-10
Payer: SELF-PAY

## 2024-05-10 PROCEDURE — V5299A: CUSTOM

## 2024-06-07 RX ORDER — APIXABAN 5 MG/1
5 TABLET, FILM COATED ORAL
Qty: 180 | Refills: 3 | Status: ACTIVE | COMMUNITY
Start: 2020-07-23 | End: 1900-01-01

## 2024-09-09 ENCOUNTER — APPOINTMENT (OUTPATIENT)
Dept: CARDIOLOGY | Facility: CLINIC | Age: 74
End: 2024-09-09
Payer: MEDICARE

## 2024-09-09 ENCOUNTER — NON-APPOINTMENT (OUTPATIENT)
Age: 74
End: 2024-09-09

## 2024-09-09 VITALS
DIASTOLIC BLOOD PRESSURE: 72 MMHG | SYSTOLIC BLOOD PRESSURE: 126 MMHG | BODY MASS INDEX: 23.95 KG/M2 | WEIGHT: 158 LBS | HEIGHT: 68 IN | HEART RATE: 58 BPM | OXYGEN SATURATION: 99 %

## 2024-09-09 DIAGNOSIS — I10 ESSENTIAL (PRIMARY) HYPERTENSION: ICD-10-CM

## 2024-09-09 DIAGNOSIS — I49.5 SICK SINUS SYNDROME: ICD-10-CM

## 2024-09-09 DIAGNOSIS — I48.19 OTHER PERSISTENT ATRIAL FIBRILLATION: ICD-10-CM

## 2024-09-09 PROCEDURE — 93000 ELECTROCARDIOGRAM COMPLETE: CPT

## 2024-09-09 PROCEDURE — G2211 COMPLEX E/M VISIT ADD ON: CPT

## 2024-09-09 PROCEDURE — 99214 OFFICE O/P EST MOD 30 MIN: CPT

## 2024-09-09 NOTE — CARDIOLOGY SUMMARY
[de-identified] : 1/17/2023 sinus rhythm, diffuse non-specific ST-T wave abnormality, unchanged from 9/4/2020. 2/17/2023 sinus rhythm 60, borderline 1st degree heart block  ms, non-specific ST-T wave abnormality. 5/11/2023 sinus rhythm, borderline 1st degree heart block, non-specific QRS widening and ST-T wave abnormality. 8/18/2023 sinus rhythm 56, borderline 1st degree heart block  ms, non-specific ST-T wave abnormality. 3/13/2024 sinus bradycardia, 1st degree heart block  ms, non-specific ST-T wave abnormality. Unchanged from multiple prior. 9/9/2024

## 2024-09-09 NOTE — DISCUSSION/SUMMARY
[EKG obtained to assist in diagnosis and management of assessed problem(s)] : EKG obtained to assist in diagnosis and management of assessed problem(s) [Paroxysmal Atrial Fibrillation] : paroxysmal atrial fibrillation [Rhythm Control] : rhythm control [Anticoagulation] : anticoagulation [Continuous Antiarrhythmics] : continuous antiarrhythmics [Hypertension] : hypertension [Responding to Treatment] : responding to treatment [Urine Metanephrine] : urine metanephrine [Sick Sinus Syndrome] : sick sinus syndrome [Stress Testing] : stress testing [None] : There are no changes in medication management [Patient] : the patient [de-identified] : had ablation  [de-identified] : remain on apixaban and on flecainide 50 mg - 1/2 tab in morning and 1 tab in evening [de-identified] : remaining on losartan to 50 mg BID [de-identified] : mild chronotropic incompetence [de-identified] : adequate resting heart rate, dubious that pacemaker will give her substantial increase in exercise tolerance

## 2024-09-09 NOTE — HISTORY OF PRESENT ILLNESS
[FreeTextEntry1] : Mrs. Chika Draper is a 74-year-old woman evaluated in the past for mild sick sinus syndrome with tendency to bradycardia and felt to have no significant cardiac disease. After a busy day at work onset of sense that her vision was "swimming" and she tolerated for several hours, but when it persisted the following morning went to the emergency room. She had a complete neurological evaluation and MRI/MRA and was concluded not to have any central nervous system abnormality. She was observed for bradycardia in the CDU and had an echocardiogram that was entirely normal. She was discharged and advised to followup with cardiologist.  Remained well for a time, then sudden episode of profound weakness. Could barely stand up, unable to get to car. Called ambulance and went to [facility] ER. Blood pressure elevated in range of 200/100, but declined without specific treatment. CT head, MRI, complete blood tests all normal and then cardiac echo normal as well including normal bubble study. Had.renal sonogram and urine for metanephrines and saw Dr. Roach in office. Has had further neurology evaluation and presumed diagnosis of Meniere's and had monthly severe vertiginous spells. Neurologist proposed starting acetazolamide, then increased . Event Monitor proved unremarkable.  Has ongoing efforts to control Meniere's disease and continuing modification of medication regimen.  She is no longer on Diamox and is now on spironolactone, but episodes of vertigo and poor balance persist.  There have been no palpitations and no syncope.  Seen few years ago after woke early in morning dizzy, EMS found hypotensive in 60s, in ER blood pressure satisfactory, but tachycardia interpreted as sinus tach, however probably atrial tachycardia at 118 and on further review have concluded it was atypical atrial flutter with 2:1 block. After few hours broke to sinus and then on further monitoring had runs of atrial fibrillation and started on anticoagulation with apixaban. Was aware of SVT, but not of runs of atrial fibrillation and remained with no palpitations after hospital discharge. Then prolonged palpitations, came to office and sent to ER. Sustained atrial flutter and had ablation which demonstrated unexpected high degree of atrial scarring. Sinus rhythm restored and prior to discharge had cardiac MRI as well as Tc-PYR scan which revealed no cardiac uptake. Last few years co-managed with Electrophysiology.  Maintaining sinus rhythm, feeling generally well, can walk moderate distances on level ground, but on incline becomes winded. Also recently blood pressure has been elevated on checks at home.  Call few months ago for urgent visit, hand bleed. Prior night sudden discomfort, swelling and ecchymosis of right hand. Placed ice compress and controlled.  Since then feeling well, no further bleeding issues, no palpitations and traveled to [place], did all activities without restriction, but on return had COVID, mild case and recovered quickly. Checks blood pressure regularly and generally favorable.  Continues reasonably well, active, walking for exercise, but  observes she walks slowly, maybe slower than in past. She has little appetite and eats relatively small amounts and has lost about 10 pounds. She has no dyspnea and only infrequent, transient palpitation.

## 2024-09-09 NOTE — REVIEW OF SYSTEMS
[Vertigo] : vertigo [Dyspnea on exertion] : dyspnea during exertion [Joint Pain] : joint pain [Negative] : Heme/Lymph [Feeling Fatigued] : feeling fatigued [Weight Loss (___ Lbs)] : [unfilled] ~Ulb weight loss [Chest Discomfort] : no chest discomfort [Lower Ext Edema] : no extremity edema [Leg Claudication] : no intermittent leg claudication [Palpitations] : no palpitations [Orthopnea] : no orthopnea

## 2024-09-10 ENCOUNTER — NON-APPOINTMENT (OUTPATIENT)
Age: 74
End: 2024-09-10

## 2024-09-11 ENCOUNTER — NON-APPOINTMENT (OUTPATIENT)
Age: 74
End: 2024-09-11

## 2024-09-11 ENCOUNTER — APPOINTMENT (OUTPATIENT)
Dept: OPHTHALMOLOGY | Facility: CLINIC | Age: 74
End: 2024-09-11

## 2024-09-11 PROCEDURE — 92025 CPTRIZED CORNEAL TOPOGRAPHY: CPT

## 2024-09-11 PROCEDURE — 92134 CPTRZ OPH DX IMG PST SGM RTA: CPT

## 2024-09-11 PROCEDURE — 92004 COMPRE OPH EXAM NEW PT 1/>: CPT

## 2024-09-11 PROCEDURE — 92136 OPHTHALMIC BIOMETRY: CPT

## 2024-09-12 ENCOUNTER — APPOINTMENT (OUTPATIENT)
Dept: CARDIOLOGY | Facility: CLINIC | Age: 74
End: 2024-09-12

## 2024-10-30 ENCOUNTER — APPOINTMENT (OUTPATIENT)
Dept: OPHTHALMOLOGY | Facility: CLINIC | Age: 74
End: 2024-10-30

## 2024-12-16 ENCOUNTER — OUTPATIENT (OUTPATIENT)
Dept: OUTPATIENT SERVICES | Facility: HOSPITAL | Age: 74
LOS: 1 days | End: 2024-12-16
Payer: MEDICARE

## 2024-12-16 ENCOUNTER — APPOINTMENT (OUTPATIENT)
Dept: RADIOLOGY | Facility: IMAGING CENTER | Age: 74
End: 2024-12-16
Payer: MEDICARE

## 2024-12-16 DIAGNOSIS — Z00.8 ENCOUNTER FOR OTHER GENERAL EXAMINATION: ICD-10-CM

## 2024-12-16 DIAGNOSIS — Z90.89 ACQUIRED ABSENCE OF OTHER ORGANS: Chronic | ICD-10-CM

## 2024-12-16 PROCEDURE — 77080 DXA BONE DENSITY AXIAL: CPT | Mod: 26

## 2024-12-16 PROCEDURE — 77080 DXA BONE DENSITY AXIAL: CPT

## 2024-12-23 ENCOUNTER — RX RENEWAL (OUTPATIENT)
Age: 74
End: 2024-12-23

## 2025-04-14 ENCOUNTER — NON-APPOINTMENT (OUTPATIENT)
Age: 75
End: 2025-04-14

## 2025-04-14 ENCOUNTER — APPOINTMENT (OUTPATIENT)
Dept: CARDIOLOGY | Facility: CLINIC | Age: 75
End: 2025-04-14

## 2025-04-14 VITALS
HEIGHT: 68 IN | WEIGHT: 162.13 LBS | SYSTOLIC BLOOD PRESSURE: 118 MMHG | HEART RATE: 58 BPM | OXYGEN SATURATION: 98 % | BODY MASS INDEX: 24.57 KG/M2 | DIASTOLIC BLOOD PRESSURE: 70 MMHG

## 2025-04-14 DIAGNOSIS — I49.5 SICK SINUS SYNDROME: ICD-10-CM

## 2025-04-14 DIAGNOSIS — I10 ESSENTIAL (PRIMARY) HYPERTENSION: ICD-10-CM

## 2025-04-14 DIAGNOSIS — Z79.01 LONG TERM (CURRENT) USE OF ANTICOAGULANTS: ICD-10-CM

## 2025-04-14 DIAGNOSIS — I48.0 PAROXYSMAL ATRIAL FIBRILLATION: ICD-10-CM

## 2025-04-14 PROCEDURE — 99214 OFFICE O/P EST MOD 30 MIN: CPT | Mod: 25

## 2025-04-14 PROCEDURE — 93000 ELECTROCARDIOGRAM COMPLETE: CPT

## 2025-04-14 RX ORDER — CANDESARTAN 16 MG/1
16 TABLET ORAL TWICE DAILY
Qty: 180 | Refills: 0 | Status: ACTIVE | COMMUNITY
Start: 2025-04-14

## 2025-05-27 ENCOUNTER — RX RENEWAL (OUTPATIENT)
Age: 75
End: 2025-05-27

## 2025-06-17 ENCOUNTER — NON-APPOINTMENT (OUTPATIENT)
Age: 75
End: 2025-06-17

## 2025-07-29 ENCOUNTER — NON-APPOINTMENT (OUTPATIENT)
Age: 75
End: 2025-07-29

## 2025-07-29 ENCOUNTER — APPOINTMENT (OUTPATIENT)
Dept: CARDIOLOGY | Facility: CLINIC | Age: 75
End: 2025-07-29

## 2025-07-29 ENCOUNTER — APPOINTMENT (OUTPATIENT)
Dept: CARDIOLOGY | Facility: CLINIC | Age: 75
End: 2025-07-29
Payer: MEDICARE

## 2025-07-29 VITALS
HEIGHT: 68 IN | BODY MASS INDEX: 24.1 KG/M2 | HEART RATE: 60 BPM | DIASTOLIC BLOOD PRESSURE: 76 MMHG | SYSTOLIC BLOOD PRESSURE: 138 MMHG | WEIGHT: 159 LBS | OXYGEN SATURATION: 97 %

## 2025-07-29 DIAGNOSIS — I10 ESSENTIAL (PRIMARY) HYPERTENSION: ICD-10-CM

## 2025-07-29 DIAGNOSIS — I49.5 SICK SINUS SYNDROME: ICD-10-CM

## 2025-07-29 DIAGNOSIS — Z79.01 LONG TERM (CURRENT) USE OF ANTICOAGULANTS: ICD-10-CM

## 2025-07-29 DIAGNOSIS — I48.0 PAROXYSMAL ATRIAL FIBRILLATION: ICD-10-CM

## 2025-07-29 PROCEDURE — G2211 COMPLEX E/M VISIT ADD ON: CPT

## 2025-07-29 PROCEDURE — 99214 OFFICE O/P EST MOD 30 MIN: CPT

## 2025-07-29 PROCEDURE — 93000 ELECTROCARDIOGRAM COMPLETE: CPT

## 2025-08-27 ENCOUNTER — APPOINTMENT (OUTPATIENT)
Dept: ELECTROPHYSIOLOGY | Facility: CLINIC | Age: 75
End: 2025-08-27
Payer: MEDICARE

## 2025-08-27 VITALS
HEART RATE: 73 BPM | DIASTOLIC BLOOD PRESSURE: 84 MMHG | BODY MASS INDEX: 24.82 KG/M2 | OXYGEN SATURATION: 98 % | RESPIRATION RATE: 14 BRPM | WEIGHT: 160 LBS | HEIGHT: 67.5 IN | SYSTOLIC BLOOD PRESSURE: 155 MMHG

## 2025-08-27 DIAGNOSIS — Z79.01 LONG TERM (CURRENT) USE OF ANTICOAGULANTS: ICD-10-CM

## 2025-08-27 DIAGNOSIS — I47.19 OTHER SUPRAVENTRICULAR TACHYCARDIA: ICD-10-CM

## 2025-08-27 DIAGNOSIS — I49.5 SICK SINUS SYNDROME: ICD-10-CM

## 2025-08-27 PROCEDURE — 93000 ELECTROCARDIOGRAM COMPLETE: CPT

## 2025-08-27 PROCEDURE — 99214 OFFICE O/P EST MOD 30 MIN: CPT
